# Patient Record
Sex: FEMALE | Race: WHITE | NOT HISPANIC OR LATINO | Employment: FULL TIME | URBAN - METROPOLITAN AREA
[De-identification: names, ages, dates, MRNs, and addresses within clinical notes are randomized per-mention and may not be internally consistent; named-entity substitution may affect disease eponyms.]

---

## 2017-02-16 ENCOUNTER — LAB VISIT (OUTPATIENT)
Dept: LAB | Facility: HOSPITAL | Age: 52
End: 2017-02-16
Attending: FAMILY MEDICINE
Payer: COMMERCIAL

## 2017-02-16 ENCOUNTER — OFFICE VISIT (OUTPATIENT)
Dept: INTERNAL MEDICINE | Facility: CLINIC | Age: 52
End: 2017-02-16
Payer: COMMERCIAL

## 2017-02-16 VITALS
TEMPERATURE: 98 F | WEIGHT: 173.31 LBS | BODY MASS INDEX: 29.59 KG/M2 | SYSTOLIC BLOOD PRESSURE: 146 MMHG | HEIGHT: 64 IN | DIASTOLIC BLOOD PRESSURE: 88 MMHG | HEART RATE: 80 BPM

## 2017-02-16 DIAGNOSIS — R03.0 ELEVATED BLOOD PRESSURE READING: ICD-10-CM

## 2017-02-16 DIAGNOSIS — R42 DIZZINESS: Primary | ICD-10-CM

## 2017-02-16 DIAGNOSIS — R42 DIZZINESS: ICD-10-CM

## 2017-02-16 DIAGNOSIS — Z76.89 ESTABLISHING CARE WITH NEW DOCTOR, ENCOUNTER FOR: ICD-10-CM

## 2017-02-16 LAB
ALBUMIN SERPL BCP-MCNC: 3.6 G/DL
ALP SERPL-CCNC: 68 U/L
ALT SERPL W/O P-5'-P-CCNC: 21 U/L
ANION GAP SERPL CALC-SCNC: 9 MMOL/L
AST SERPL-CCNC: 25 U/L
BASOPHILS # BLD AUTO: 0.05 K/UL
BASOPHILS NFR BLD: 0.8 %
BILIRUB SERPL-MCNC: 0.4 MG/DL
BUN SERPL-MCNC: 10 MG/DL
CALCIUM SERPL-MCNC: 9 MG/DL
CHLORIDE SERPL-SCNC: 104 MMOL/L
CO2 SERPL-SCNC: 26 MMOL/L
CREAT SERPL-MCNC: 0.8 MG/DL
DIFFERENTIAL METHOD: ABNORMAL
EOSINOPHIL # BLD AUTO: 0.2 K/UL
EOSINOPHIL NFR BLD: 3.3 %
ERYTHROCYTE [DISTWIDTH] IN BLOOD BY AUTOMATED COUNT: 14.7 %
EST. GFR  (AFRICAN AMERICAN): >60 ML/MIN/1.73 M^2
EST. GFR  (NON AFRICAN AMERICAN): >60 ML/MIN/1.73 M^2
GLUCOSE SERPL-MCNC: 79 MG/DL
HCT VFR BLD AUTO: 38.2 %
HGB BLD-MCNC: 12 G/DL
LYMPHOCYTES # BLD AUTO: 1.9 K/UL
LYMPHOCYTES NFR BLD: 31.3 %
MCH RBC QN AUTO: 26.5 PG
MCHC RBC AUTO-ENTMCNC: 31.4 %
MCV RBC AUTO: 84 FL
MONOCYTES # BLD AUTO: 0.6 K/UL
MONOCYTES NFR BLD: 9.3 %
NEUTROPHILS # BLD AUTO: 3.4 K/UL
NEUTROPHILS NFR BLD: 55.1 %
PLATELET # BLD AUTO: 265 K/UL
PMV BLD AUTO: 10.7 FL
POTASSIUM SERPL-SCNC: 3.8 MMOL/L
PROT SERPL-MCNC: 7.4 G/DL
RBC # BLD AUTO: 4.53 M/UL
SODIUM SERPL-SCNC: 139 MMOL/L
WBC # BLD AUTO: 6.11 K/UL

## 2017-02-16 PROCEDURE — 99214 OFFICE O/P EST MOD 30 MIN: CPT | Mod: S$GLB,,, | Performed by: FAMILY MEDICINE

## 2017-02-16 PROCEDURE — 85025 COMPLETE CBC W/AUTO DIFF WBC: CPT

## 2017-02-16 PROCEDURE — 99999 PR PBB SHADOW E&M-EST. PATIENT-LVL III: CPT | Mod: PBBFAC,,, | Performed by: FAMILY MEDICINE

## 2017-02-16 PROCEDURE — 80053 COMPREHEN METABOLIC PANEL: CPT

## 2017-02-16 PROCEDURE — 36415 COLL VENOUS BLD VENIPUNCTURE: CPT

## 2017-02-16 NOTE — LETTER
February 16, 2017      [facility]  [address] [place] [address]  Phone: [phone]  Fax: [phone]       Patient: Lia Ragsdale   YOB: 1965  Date of Visit: 02/16/2017    To Whom It May Concern:    Lia was at [facility] on 02/16/2017. She may return to work on 02/20/2017 with no restrictions. If you have any questions or concerns, or if I can be of further assistance, please do not hesitate to contact me.          Sincerely,        Romana Rivera MD

## 2017-02-16 NOTE — MR AVS SNAPSHOT
"    [facility]  [address] [facility]  [place] [address]  Phone: [phone]  Fax: [phone]                  Lia Ragsdale   2017 11:00 AM   Office Visit    Description:  Female : 1965   Provider:  Romana Rivera MD   Department:  [facility]           Reason for Visit     Establish Care     Hypertension     Dizziness           Diagnoses this Visit        Comments    Dizziness    -  Primary     Elevated blood pressure reading                To Do List           Goals (5 Years of Data)     None      Follow-Up and Disposition     Return in about 4 weeks (around 3/16/2017).      [facility]     [facility] On Call Nurse Care Line -  Assistance  Registered nurses in the [facility] Center provide clinical advisement, health education, appointment booking, and other advisory services.  Call for this free service at [phone].             Medications           Message regarding Medications     Verify the changes and/or additions to your medication regime listed below are the same as discussed with your clinician today.  If any of these changes or additions are incorrect, please notify your healthcare provider.             Verify that the below list of medications is an accurate representation of the medications you are currently taking.  If none reported, the list may be blank. If incorrect, please contact your healthcare provider. Carry this list with you in case of emergency.           Current Medications     cetirizine (ZYRTEC) 10 MG tablet     pantoprazole (PROTONIX) 40 MG tablet Take 1 tablet (40 mg total) by mouth once daily.    tolterodine (DETROL LA) 2 MG Cp24 Take 1 capsule (2 mg total) by mouth once daily.           Clinical Reference Information           Your Vitals Were     BP Pulse Temp Height Weight BMI    146/88 80 97.9 °F (36.6 °C) (Tympanic) 5' 4" (1.626 m) 78.6 kg (173 lb 4.5 oz) 29.74 kg/m2      Blood Pressure          Most " Recent Value    BP  (!)  146/88      Allergies as of 2/16/2017     No Known Allergies      Immunizations Administered on Date of Encounter - 2/16/2017     None      Orders Placed During Today's Visit     Future Labs/Procedures Expected by Expires    CBC auto differential  2/16/2017 5/17/2017    Comprehensive metabolic panel  2/16/2017 4/17/2018    Lipid panel  2/16/2017 5/17/2017      Language Assistance Services     ATTENTION: Language assistance services are available, free of charge. Please call [phone].      ATENCIÓN: Si habla andreiaañol, tiene a tapia disposición servicios gratuitos de asistencia lingüística. Llame al [phone].     CHÚ Ý: N?u b?n nói Ti?ng Vi?t, có các d?ch v? h? tr? ngôn ng? mi?n phí dành cho b?n. G?i s? [phone].         [facility] complies with applicable Federal civil rights laws and does not discriminate on the basis of race, color, national origin, age, disability, or sex.

## 2017-02-16 NOTE — PROGRESS NOTES
Subjective:       Patient ID: Lia Ragsdale is a 51 y.o. female.    Chief Complaint: Establish Care; Hypertension; and Dizziness    HPI Mrs. Ragsdale presents today to Establish care and address elevated blood pressure and dizziness. She has a past medical history as listed below.   She states that she has never had problems with blood pressure but she got up this morning felt nausea and like she was off balance. She feels a little better now. She drank apple cider vinegar and water.   This morning at work had nurse check 8 am 164/82, 9 am 150/80 and 10am 158/82.  She has no other complaint.    Review of Systems   Constitutional: Negative for activity change, appetite change, fatigue and fever.   HENT: Negative for congestion, ear pain and sinus pressure.    Eyes: Negative for pain and visual disturbance.   Respiratory: Negative for cough, chest tightness and wheezing.    Cardiovascular: Negative for chest pain, palpitations and leg swelling.   Gastrointestinal: Negative for abdominal distention, abdominal pain, constipation, diarrhea, nausea and vomiting.   Endocrine: Negative for polydipsia and polyuria.   Genitourinary: Negative for difficulty urinating, dyspareunia, dysuria, flank pain and hematuria.   Musculoskeletal: Negative for arthralgias and back pain.   Skin: Negative for rash.   Neurological: Negative for dizziness, tremors, syncope, weakness, numbness and headaches.   Psychiatric/Behavioral: Negative for agitation and confusion.           Past Medical History   Diagnosis Date    Bladder instability 2015    Depression      History of depression in the past. Pt states it was from a stressful job.    Gallstones     General anesthetics causing adverse effect in therapeutic use      had respiratory distress after propofol    HPV (human papilloma virus) anogenital infection     Peptic ulcer disease      Past Surgical History   Procedure Laterality Date    Tubal ligation       section,  low transverse       x 1    Cholecystectomy       Family History   Problem Relation Age of Onset    Colon cancer Father     Diabetes Mother     Hypertension Mother     Hypertension Sister     Breast cancer Sister 54     Stage 1     Social History     Social History    Marital status:      Spouse name: N/A    Number of children: N/A    Years of education: N/A     Social History Main Topics    Smoking status: Never Smoker    Smokeless tobacco: Never Used    Alcohol use Yes      Comment: Socially drinks    Drug use: No    Sexual activity: Yes     Partners: Male     Other Topics Concern    None     Social History Narrative       Objective:        Physical Exam   Constitutional: She is oriented to person, place, and time. She appears well-nourished. No distress.   HENT:   Head: Normocephalic and atraumatic.   Right Ear: External ear normal.   Left Ear: External ear normal.   Nose: Nose normal.   Mouth/Throat: Oropharynx is clear and moist.   Eyes: EOM are normal. Pupils are equal, round, and reactive to light. Right eye exhibits no discharge. Left eye exhibits no discharge.   Neck: Normal range of motion. Neck supple. No thyromegaly present.   Cardiovascular: Normal rate, regular rhythm and normal heart sounds.    No murmur heard.  Pulmonary/Chest: Effort normal and breath sounds normal. No respiratory distress. She has no wheezes.   Abdominal: Soft. Bowel sounds are normal. She exhibits no distension. There is no tenderness.   Musculoskeletal: Normal range of motion. She exhibits no edema.   Neurological: She is alert and oriented to person, place, and time. Coordination normal.   Skin: Skin is warm and dry. No rash noted.   Psychiatric: She has a normal mood and affect. Her behavior is normal.   Nursing note and vitals reviewed.        Assessment/Plan:   Dizziness  -     Comprehensive metabolic panel; Future; Expected date: 2/16/17  -     CBC auto differential; Future; Expected date:  2/16/17    Elevated blood pressure reading  -     Comprehensive metabolic panel; Future; Expected date: 2/16/17  -     Lipid panel; Future; Expected date: 2/16/17  Pt to check BP for the next 2 weeks at least once daily.     Establishing care with new doctor, encounter for        -     Reviewed past medical, surgical, social and family history. Discussed health maintenance.       Return in about 4 weeks (around 3/16/2017).    Romana Rivera MD  [facility]   Family Medicine

## 2017-02-20 ENCOUNTER — LAB VISIT (OUTPATIENT)
Dept: LAB | Facility: HOSPITAL | Age: 52
End: 2017-02-20
Attending: FAMILY MEDICINE
Payer: COMMERCIAL

## 2017-02-20 DIAGNOSIS — R03.0 ELEVATED BLOOD PRESSURE READING: ICD-10-CM

## 2017-02-20 LAB
CHOLEST/HDLC SERPL: 4.2 {RATIO}
HDL/CHOLESTEROL RATIO: 23.6 %
HDLC SERPL-MCNC: 220 MG/DL
HDLC SERPL-MCNC: 52 MG/DL
LDLC SERPL CALC-MCNC: 152.6 MG/DL
NONHDLC SERPL-MCNC: 168 MG/DL
TRIGL SERPL-MCNC: 77 MG/DL

## 2017-02-20 PROCEDURE — 36415 COLL VENOUS BLD VENIPUNCTURE: CPT

## 2017-02-20 PROCEDURE — 80061 LIPID PANEL: CPT

## 2017-02-27 ENCOUNTER — OFFICE VISIT (OUTPATIENT)
Dept: INTERNAL MEDICINE | Facility: CLINIC | Age: 52
End: 2017-02-27
Payer: COMMERCIAL

## 2017-02-27 VITALS
TEMPERATURE: 98 F | BODY MASS INDEX: 29.92 KG/M2 | OXYGEN SATURATION: 99 % | HEIGHT: 64 IN | WEIGHT: 175.25 LBS | HEART RATE: 76 BPM | SYSTOLIC BLOOD PRESSURE: 142 MMHG | DIASTOLIC BLOOD PRESSURE: 84 MMHG

## 2017-02-27 DIAGNOSIS — R03.0 ELEVATED BLOOD PRESSURE READING: Primary | ICD-10-CM

## 2017-02-27 DIAGNOSIS — R51.9 PRESSURE IN HEAD: ICD-10-CM

## 2017-02-27 DIAGNOSIS — R42 DIZZINESS: ICD-10-CM

## 2017-02-27 DIAGNOSIS — Z87.828 H/O HEAD INJURY: ICD-10-CM

## 2017-02-27 DIAGNOSIS — I10 ESSENTIAL HYPERTENSION: ICD-10-CM

## 2017-02-27 DIAGNOSIS — R51.9 HEADACHE, UNSPECIFIED HEADACHE TYPE: ICD-10-CM

## 2017-02-27 PROCEDURE — 99999 PR PBB SHADOW E&M-EST. PATIENT-LVL III: CPT | Mod: PBBFAC,,, | Performed by: FAMILY MEDICINE

## 2017-02-27 PROCEDURE — 99213 OFFICE O/P EST LOW 20 MIN: CPT | Mod: S$GLB,,, | Performed by: FAMILY MEDICINE

## 2017-02-27 PROCEDURE — 1160F RVW MEDS BY RX/DR IN RCRD: CPT | Mod: S$GLB,,, | Performed by: FAMILY MEDICINE

## 2017-02-27 PROCEDURE — 3079F DIAST BP 80-89 MM HG: CPT | Mod: S$GLB,,, | Performed by: FAMILY MEDICINE

## 2017-02-27 PROCEDURE — 3077F SYST BP >= 140 MM HG: CPT | Mod: S$GLB,,, | Performed by: FAMILY MEDICINE

## 2017-02-27 RX ORDER — CLONIDINE HYDROCHLORIDE 0.1 MG/1
0.1 TABLET ORAL 2 TIMES DAILY
Qty: 60 TABLET | Refills: 11 | Status: SHIPPED | OUTPATIENT
Start: 2017-02-27 | End: 2017-02-27

## 2017-02-27 RX ORDER — CLONIDINE HYDROCHLORIDE 0.1 MG/1
0.1 TABLET ORAL
Status: COMPLETED | OUTPATIENT
Start: 2017-02-27 | End: 2017-02-27

## 2017-02-27 RX ORDER — AMLODIPINE BESYLATE 5 MG/1
5 TABLET ORAL DAILY
Qty: 30 TABLET | Refills: 1 | Status: SHIPPED | OUTPATIENT
Start: 2017-02-27 | End: 2017-04-12 | Stop reason: SDUPTHER

## 2017-02-27 RX ADMIN — CLONIDINE HYDROCHLORIDE 0.1 MG: 0.1 TABLET ORAL at 05:02

## 2017-02-27 NOTE — MR AVS SNAPSHOT
[facility]  [address] [address]  Phone: [phone]  Fax: [phone]                  Lia Ragsdale   2017 4:40 PM   Office Visit    Description:  Female : 1965   Provider:  Romana Rivera MD   Department:  [facility]           Reason for Visit     Headache     Hypertension           Diagnoses this Visit        Comments    Elevated blood pressure reading    -  Primary     Headache, unspecified headache type         Pressure in head         Dizziness         H/O head injury         Essential hypertension                To Do List           Future Appointments        Provider Department Dept Phone    3/3/2017 9:30 AM [facility] CT1 LIMIT 500 LBS [facility] [phone]    3/17/2017 1:00 PM Romana Rivera MD [facility] [phone]      Goals (5 Years of Data)     None       These Medications        Disp Refills Start End    amlodipine (NORVASC) 5 MG tablet 30 tablet 1 2017    Take 1 tablet (5 mg total) by mouth once daily. - Oral    Pharmacy: [facility] [address] #: [phone]         [facility] On Call     [facility] Nurse Care Line -  Assistance  Registered nurses in the [facility] Center provide clinical advisement, health education, appointment booking, and other advisory services.  Call for this free service at [phone].             Medications           Message regarding Medications     Verify the changes and/or additions to your medication regime listed below are the same as discussed with your clinician today.  If any of these changes or additions are incorrect, please notify your healthcare provider.        START taking these NEW medications        Refills    amlodipine (NORVASC) 5 MG tablet 1    Sig: Take 1 tablet (5 mg total) by mouth once daily.    Class: Normal    Route: Oral      These  medications were administered today        Dose Freq    cloNIDine tablet 0.1 mg 0.1 mg Clinic/HOD 1 time    Sig: Take 1 tablet (0.1 mg total) by mouth one time.    Class: Normal    Route: Oral      STOP taking these medications     tolterodine (DETROL LA) 2 MG Cp24 Take 1 capsule (2 mg total) by mouth once daily.           Verify that the below list of medications is an accurate representation of the medications you are currently taking.  If none reported, the list may be blank. If incorrect, please contact your healthcare provider. Carry this list with you in case of emergency.           Current Medications     cetirizine (ZYRTEC) 10 MG tablet     pantoprazole (PROTONIX) 40 MG tablet Take 1 tablet (40 mg total) by mouth once daily.    amlodipine (NORVASC) 5 MG tablet Take 1 tablet (5 mg total) by mouth once daily.           Clinical Reference Information           Your Vitals Were     BP                   142/86 (BP Location: Left arm, Patient Position: Sitting, BP Method: Manual)           Blood Pressure          Most Recent Value    BP  (!)  142/86      Allergies as of 2/27/2017     No Known Allergies      Immunizations Administered on Date of Encounter - 2/27/2017     None      Orders Placed During Today's Visit     Future Labs/Procedures Expected by Expires    CT Head Without Contrast  2/27/2017 2/27/2018      Administrations This Visit     cloNIDine tablet 0.1 mg     Admin Date Action Dose Route Administered By             02/27/2017 Given 0.1 mg Oral Vera Varner LPN                      Language Assistance Services     ATTENTION: Language assistance services are available, free of charge. Please call [phone].      ATENCIÓN: Si habla español, tiene a tapia disposición servicios gratuitos de asistencia lingüística. Llame al [phone].     CHÚ Ý: N?u b?n nói Ti?ng Vi?t, có các d?ch v? h? tr? ngôn ng? mi?n phí dành cho b?n. G?i s? [phone].         [facility] complies with  applicable Federal civil rights laws and does not discriminate on the basis of race, color, national origin, age, disability, or sex.
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
## 2017-03-01 ENCOUNTER — PATIENT MESSAGE (OUTPATIENT)
Dept: INTERNAL MEDICINE | Facility: CLINIC | Age: 52
End: 2017-03-01

## 2017-03-02 ENCOUNTER — TELEPHONE (OUTPATIENT)
Dept: RADIOLOGY | Facility: HOSPITAL | Age: 52
End: 2017-03-02

## 2017-03-03 ENCOUNTER — HOSPITAL ENCOUNTER (OUTPATIENT)
Dept: RADIOLOGY | Facility: HOSPITAL | Age: 52
Discharge: HOME | End: 2017-03-03
Attending: FAMILY MEDICINE
Payer: COMMERCIAL

## 2017-03-03 DIAGNOSIS — R42 DIZZINESS: ICD-10-CM

## 2017-03-03 DIAGNOSIS — Z87.828 H/O HEAD INJURY: ICD-10-CM

## 2017-03-03 DIAGNOSIS — R51.9 PRESSURE IN HEAD: ICD-10-CM

## 2017-03-03 PROCEDURE — 70450 CT HEAD/BRAIN W/O DYE: CPT | Mod: 26,,, | Performed by: RADIOLOGY

## 2017-03-03 PROCEDURE — 70450 CT HEAD/BRAIN W/O DYE: CPT | Mod: TC,PO

## 2017-03-15 ENCOUNTER — TELEPHONE (OUTPATIENT)
Dept: OBSTETRICS AND GYNECOLOGY | Facility: CLINIC | Age: 52
End: 2017-03-15

## 2017-03-15 NOTE — TELEPHONE ENCOUNTER
----- Message from Susan Ordoñez sent at 3/15/2017  2:05 PM CDT -----  Contact: pt  States she left a message yesterday regarding detrol 2 mg tablet and dosage. States its not working. States she would like to know if something else can be called in or increase the dosage. States she used to take vesicare and would like to have that called in. Please call pt at [phone]. Thank you

## 2017-03-15 NOTE — TELEPHONE ENCOUNTER
----- Message from Marjorie Grady sent at 3/14/2017 11:35 AM CDT -----  Contact: pt  Pt states she is having a problem with leakage and states Detrol 10mg is not working pt want to be advised, pt can be reached at [phone]///thxMW

## 2017-04-12 ENCOUNTER — OFFICE VISIT (OUTPATIENT)
Dept: INTERNAL MEDICINE | Facility: CLINIC | Age: 52
End: 2017-04-12
Payer: COMMERCIAL

## 2017-04-12 VITALS
OXYGEN SATURATION: 99 % | SYSTOLIC BLOOD PRESSURE: 130 MMHG | HEART RATE: 70 BPM | DIASTOLIC BLOOD PRESSURE: 76 MMHG | BODY MASS INDEX: 29.43 KG/M2 | TEMPERATURE: 98 F | HEIGHT: 64 IN | WEIGHT: 172.38 LBS

## 2017-04-12 DIAGNOSIS — I10 ESSENTIAL HYPERTENSION: ICD-10-CM

## 2017-04-12 DIAGNOSIS — K21.9 GASTROESOPHAGEAL REFLUX DISEASE WITHOUT ESOPHAGITIS: Primary | ICD-10-CM

## 2017-04-12 DIAGNOSIS — Z23 NEED FOR TDAP VACCINATION: ICD-10-CM

## 2017-04-12 PROCEDURE — 99213 OFFICE O/P EST LOW 20 MIN: CPT | Mod: 25,S$GLB,, | Performed by: FAMILY MEDICINE

## 2017-04-12 PROCEDURE — 99999 PR PBB SHADOW E&M-EST. PATIENT-LVL III: CPT | Mod: PBBFAC,,, | Performed by: FAMILY MEDICINE

## 2017-04-12 PROCEDURE — 90715 TDAP VACCINE 7 YRS/> IM: CPT | Mod: S$GLB,,, | Performed by: FAMILY MEDICINE

## 2017-04-12 PROCEDURE — 3075F SYST BP GE 130 - 139MM HG: CPT | Mod: S$GLB,,, | Performed by: FAMILY MEDICINE

## 2017-04-12 PROCEDURE — 90471 IMMUNIZATION ADMIN: CPT | Mod: S$GLB,,, | Performed by: FAMILY MEDICINE

## 2017-04-12 PROCEDURE — 1160F RVW MEDS BY RX/DR IN RCRD: CPT | Mod: S$GLB,,, | Performed by: FAMILY MEDICINE

## 2017-04-12 PROCEDURE — 3078F DIAST BP <80 MM HG: CPT | Mod: S$GLB,,, | Performed by: FAMILY MEDICINE

## 2017-04-12 RX ORDER — PANTOPRAZOLE SODIUM 40 MG/1
40 TABLET, DELAYED RELEASE ORAL DAILY
Qty: 90 TABLET | Refills: 2 | Status: SHIPPED | OUTPATIENT
Start: 2017-04-12 | End: 2018-02-07 | Stop reason: SDUPTHER

## 2017-04-12 RX ORDER — AMLODIPINE BESYLATE 5 MG/1
5 TABLET ORAL DAILY
Qty: 90 TABLET | Refills: 3 | Status: SHIPPED | OUTPATIENT
Start: 2017-04-12 | End: 2018-02-07

## 2017-04-12 NOTE — MR AVS SNAPSHOT
[facility]  [address] [place] [address]  Phone: [phone]  Fax: [phone]                  Lia Ragsdale   2017 9:00 AM   Office Visit    Description:  Female : 1965   Provider:  Romana Rivera MD   Department:  [facility]           Reason for Visit     Hypertension           Diagnoses this Visit        Comments    Gastroesophageal reflux disease without esophagitis    -  Primary     Essential hypertension         Need for Tdap vaccination                To Do List           Goals (5 Years of Data)     None       These Medications        Disp Refills Start End    amlodipine (NORVASC) 5 MG tablet 90 tablet 3 2017    Take 1 tablet (5 mg total) by mouth once daily. - Oral    Pharmacy: [place] [id number] Ph #: [phone]       pantoprazole (PROTONIX) 40 MG tablet 90 tablet 2 2017    Take 1 tablet (40 mg total) by mouth once daily. - Oral    Pharmacy: [place] [address] Ph #: [phone]         [facility] Call     [facility] On Call Nurse Care Line -  Assistance  Unless otherwise directed by your provider, please contact [facility], our nurse care line that is available for  assistance.     Registered nurses in the [facility] On Call Center provide: appointment scheduling, clinical advisement, health education, and other advisory services.  Call: [phone] (toll free)               Medications           Message regarding Medications     Verify the changes and/or additions to your medication regime listed below are the same as discussed with your clinician today.  If any of these changes or additions are incorrect, please notify your healthcare provider.             Verify that the below list of medications is an accurate representation of the medications you are currently taking.   "If none reported, the list may be blank. If incorrect, please contact your healthcare provider. Carry this list with you in case of emergency.           Current Medications     amlodipine (NORVASC) 5 MG tablet Take 1 tablet (5 mg total) by mouth once daily.    cetirizine (ZYRTEC) 10 MG tablet     pantoprazole (PROTONIX) 40 MG tablet Take 1 tablet (40 mg total) by mouth once daily.           Clinical Reference Information           Your Vitals Were     BP Pulse Temp Height    130/76 (BP Location: Left arm, Patient Position: Sitting, BP Method: Manual) 70 97.5 °F (36.4 °C) (Tympanic) 5' 4" (1.626 m)    Weight SpO2 BMI    78.2 kg (172 lb 6.4 oz) 99% 29.59 kg/m2      Blood Pressure          Most Recent Value    BP  130/76      Allergies as of 4/12/2017     No Known Allergies      Immunizations Administered on Date of Encounter - 4/12/2017     Name Date Dose VIS Date Route    TDAP  Incomplete 0.5 mL 2/24/2015 Intramuscular      Orders Placed During Today's Visit      Normal Orders This Visit    Tdap Vaccine       Language Assistance Services     ATTENTION: Language assistance services are available, free of charge. Please call [phone].      ATENCIÓN: Si habla pipo, tiene a tapia disposición servicios gratuitos de asistencia lingüística. Llame al [phone].     CHÚ Ý: N?u b?n nói Ti?ng Vi?t, có các d?ch v? h? tr? ngôn ng? mi?n phí dành cho b?n. G?i s? [phone].         [facility] complies with applicable Federal civil rights laws and does not discriminate on the basis of race, color, national origin, age, disability, or sex.        "

## 2018-02-02 ENCOUNTER — OFFICE VISIT (OUTPATIENT)
Dept: OBSTETRICS AND GYNECOLOGY | Facility: CLINIC | Age: 53
End: 2018-02-02
Payer: COMMERCIAL

## 2018-02-02 VITALS
HEIGHT: 64 IN | BODY MASS INDEX: 29.02 KG/M2 | DIASTOLIC BLOOD PRESSURE: 76 MMHG | WEIGHT: 170 LBS | SYSTOLIC BLOOD PRESSURE: 128 MMHG

## 2018-02-02 DIAGNOSIS — Z12.31 SCREENING MAMMOGRAM, ENCOUNTER FOR: ICD-10-CM

## 2018-02-02 DIAGNOSIS — Z78.0 MENOPAUSE: ICD-10-CM

## 2018-02-02 DIAGNOSIS — Z01.419 ENCOUNTER FOR GYNECOLOGICAL EXAMINATION WITHOUT ABNORMAL FINDING: ICD-10-CM

## 2018-02-02 DIAGNOSIS — Z12.4 PAP SMEAR FOR CERVICAL CANCER SCREENING: ICD-10-CM

## 2018-02-02 DIAGNOSIS — N32.89 BLADDER INSTABILITY: Primary | ICD-10-CM

## 2018-02-02 PROCEDURE — 88175 CYTOPATH C/V AUTO FLUID REDO: CPT

## 2018-02-02 PROCEDURE — 99999 PR PBB SHADOW E&M-EST. PATIENT-LVL III: CPT | Mod: PBBFAC,,, | Performed by: OBSTETRICS & GYNECOLOGY

## 2018-02-02 PROCEDURE — 99396 PREV VISIT EST AGE 40-64: CPT | Mod: S$GLB,,, | Performed by: OBSTETRICS & GYNECOLOGY

## 2018-02-02 RX ORDER — SOLIFENACIN SUCCINATE 5 MG/1
5 TABLET, FILM COATED ORAL DAILY
Qty: 30 TABLET | Refills: 11 | Status: SHIPPED | OUTPATIENT
Start: 2018-02-02 | End: 2019-01-24 | Stop reason: SDUPTHER

## 2018-02-02 NOTE — PROGRESS NOTES
Subjective:       Patient ID: Lia Ragsdale is a 52 y.o. female.    Chief Complaint:  Annual Exam      History of Present Illness  HPI  Patient presents to day for annual exam , postmenopausal since 2017    No gyn complaints, no vaginal bleeding or pelvic pain noted.   Having return of urge since off Vesicare.  Advised to start again if symptoms improved   Hormonal therapy reviewed and discussed. No symptoms, on none   Preventive screening exam indication and testing reviewed and discussed.      GYN & OB History  No LMP recorded. Patient is not currently having periods (Reason: Other).   Date of Last Pap: 2015    OB History    Para Term  AB Living   1 1 1     1   SAB TAB Ectopic Multiple Live Births                  # Outcome Date GA Lbr Lokesh/2nd Weight Sex Delivery Anes PTL Lv   1 Term      CS-Unspec             Review of Systems  Review of Systems   Constitutional: Negative for activity change, appetite change, chills, fatigue, fever and unexpected weight change.   HENT: Negative for mouth sores.    Eyes: Negative for visual disturbance.   Respiratory: Negative for cough and shortness of breath.    Cardiovascular: Negative for chest pain and palpitations.   Gastrointestinal: Negative for abdominal pain, bloating, blood in stool, constipation, diarrhea and nausea.   Genitourinary: Positive for urgency. Negative for decreased libido, dyspareunia, dysuria, frequency, genital sores, hematuria, pelvic pain, vaginal discharge, urinary incontinence, postcoital bleeding, postmenopausal bleeding and vaginal odor.   Musculoskeletal: Negative for back pain, joint swelling and myalgias.   Skin:  Negative for rash.   Neurological: Negative for syncope, numbness and headaches.   Hematological: Negative for adenopathy. Does not bruise/bleed easily.   Psychiatric/Behavioral: Negative for depression and sleep disturbance. The patient is not nervous/anxious.    Breast: Negative for breast mass, breast  pain, nipple discharge and skin changes          Objective:    Physical Exam:   Constitutional: She appears well-developed and well-nourished. No distress.      Neck: No JVD present. No thyroid mass and no thyromegaly present.    Cardiovascular: Normal rate and regular rhythm.          Abdominal: Soft. Normal appearance and bowel sounds are normal. There is no hepatosplenomegaly. No hernia. Hernia confirmed negative in the ventral area, confirmed negative in the right inguinal area and confirmed negative in the left inguinal area.     Genitourinary: Rectum normal, vagina normal and uterus normal. There is no rash, tenderness, lesion or injury on the right labia. There is no rash, tenderness, lesion or injury on the left labia. Uterus is not deviated, not enlarged, not fixed, not tender and not experiencing uterine prolapse. Cervix is normal. Right adnexum displays no mass, no tenderness and no fullness. Left adnexum displays no mass, no tenderness and no fullness. No erythema, tenderness or bleeding in the vagina. No foreign body in the vagina. No vaginal discharge found. Labial bartholins normal.Cervix exhibits no motion tenderness, no discharge and no friability. Additional cervical findings: pap smear done                       Assessment:        1. Bladder instability    2. Pap smear for cervical cancer screening    3. Screening mammogram, encounter for    4. Menopause    5. Encounter for gynecological examination without abnormal finding                Plan:      Lia was seen today for annual exam.    Diagnoses and all orders for this visit:    Bladder instability  -     solifenacin (VESICARE) 5 MG tablet; Take 1 tablet (5 mg total) by mouth once daily.    Pap smear for cervical cancer screening  -     Liquid-based pap smear, screening    Screening mammogram, encounter for  -     Mammo Digital Screening Bilat with CAD; Future    Menopause    Encounter for gynecological examination without abnormal  finding

## 2018-02-07 ENCOUNTER — LAB VISIT (OUTPATIENT)
Dept: LAB | Facility: HOSPITAL | Age: 53
End: 2018-02-07
Attending: FAMILY MEDICINE
Payer: COMMERCIAL

## 2018-02-07 ENCOUNTER — OFFICE VISIT (OUTPATIENT)
Dept: INTERNAL MEDICINE | Facility: CLINIC | Age: 53
End: 2018-02-07
Payer: COMMERCIAL

## 2018-02-07 VITALS
HEIGHT: 64 IN | OXYGEN SATURATION: 98 % | DIASTOLIC BLOOD PRESSURE: 68 MMHG | SYSTOLIC BLOOD PRESSURE: 116 MMHG | BODY MASS INDEX: 29.02 KG/M2 | TEMPERATURE: 98 F | HEART RATE: 72 BPM | WEIGHT: 170 LBS

## 2018-02-07 DIAGNOSIS — E78.00 ELEVATED CHOLESTEROL: ICD-10-CM

## 2018-02-07 DIAGNOSIS — I10 ESSENTIAL HYPERTENSION: ICD-10-CM

## 2018-02-07 DIAGNOSIS — K21.9 GASTROESOPHAGEAL REFLUX DISEASE WITHOUT ESOPHAGITIS: ICD-10-CM

## 2018-02-07 DIAGNOSIS — E78.00 ELEVATED CHOLESTEROL: Primary | ICD-10-CM

## 2018-02-07 LAB
ALBUMIN SERPL BCP-MCNC: 3.5 G/DL
ALP SERPL-CCNC: 60 U/L
ALT SERPL W/O P-5'-P-CCNC: 9 U/L
ANION GAP SERPL CALC-SCNC: 8 MMOL/L
AST SERPL-CCNC: 14 U/L
BILIRUB SERPL-MCNC: 0.5 MG/DL
BUN SERPL-MCNC: 10 MG/DL
CALCIUM SERPL-MCNC: 9.6 MG/DL
CHLORIDE SERPL-SCNC: 104 MMOL/L
CHOLEST SERPL-MCNC: 209 MG/DL
CHOLEST/HDLC SERPL: 3.9 {RATIO}
CO2 SERPL-SCNC: 29 MMOL/L
CREAT SERPL-MCNC: 0.8 MG/DL
EST. GFR  (AFRICAN AMERICAN): >60 ML/MIN/1.73 M^2
EST. GFR  (NON AFRICAN AMERICAN): >60 ML/MIN/1.73 M^2
GLUCOSE SERPL-MCNC: 72 MG/DL
HDLC SERPL-MCNC: 53 MG/DL
HDLC SERPL: 25.4 %
LDLC SERPL CALC-MCNC: 140.2 MG/DL
NONHDLC SERPL-MCNC: 156 MG/DL
POTASSIUM SERPL-SCNC: 4 MMOL/L
PROT SERPL-MCNC: 7.3 G/DL
SODIUM SERPL-SCNC: 141 MMOL/L
TRIGL SERPL-MCNC: 79 MG/DL

## 2018-02-07 PROCEDURE — 36415 COLL VENOUS BLD VENIPUNCTURE: CPT

## 2018-02-07 PROCEDURE — 99214 OFFICE O/P EST MOD 30 MIN: CPT | Mod: S$GLB,,, | Performed by: FAMILY MEDICINE

## 2018-02-07 PROCEDURE — 99999 PR PBB SHADOW E&M-EST. PATIENT-LVL III: CPT | Mod: PBBFAC,,, | Performed by: FAMILY MEDICINE

## 2018-02-07 PROCEDURE — 80053 COMPREHEN METABOLIC PANEL: CPT

## 2018-02-07 PROCEDURE — 80061 LIPID PANEL: CPT

## 2018-02-07 PROCEDURE — 3008F BODY MASS INDEX DOCD: CPT | Mod: S$GLB,,, | Performed by: FAMILY MEDICINE

## 2018-02-07 RX ORDER — PANTOPRAZOLE SODIUM 40 MG/1
40 TABLET, DELAYED RELEASE ORAL DAILY
Qty: 90 TABLET | Refills: 3 | Status: SHIPPED | OUTPATIENT
Start: 2018-02-07 | End: 2018-11-16 | Stop reason: SDUPTHER

## 2018-02-07 RX ORDER — AMLODIPINE BESYLATE 2.5 MG/1
2.5 TABLET ORAL DAILY
Qty: 90 TABLET | Refills: 3 | Status: SHIPPED | OUTPATIENT
Start: 2018-02-07 | End: 2018-06-25 | Stop reason: SDUPTHER

## 2018-02-07 RX ORDER — CLOTRIMAZOLE AND BETAMETHASONE DIPROPIONATE 10; .64 MG/G; MG/G
15 CREAM TOPICAL 2 TIMES DAILY
COMMUNITY
End: 2018-03-02 | Stop reason: SDUPTHER

## 2018-02-07 NOTE — PROGRESS NOTES
Subjective:       Patient ID: Lia Ragsdale is a 52 y.o. female.    Chief Complaint: Medication Refill (6 mth f/u HTN) and Hypertension    HPI Ms. Ragsdale presents today for follow up on HTN and medication refill. She has no complaint today.   Taking medication as directed, denies over the counter medication use.   Walk a lot at work.   Had flu shot with work in October.  Has changed diet some limiting fast foods and fried foods.     Recently quit job because of the stress and feeling more depressed with working.     Review of Systems   Constitutional: Negative for activity change and unexpected weight change.   HENT: Negative for hearing loss, rhinorrhea and trouble swallowing.    Eyes: Negative for discharge and visual disturbance.   Respiratory: Positive for chest tightness. Negative for wheezing.    Cardiovascular: Negative for chest pain and palpitations.   Gastrointestinal: Negative for blood in stool, constipation, diarrhea and vomiting.   Endocrine: Positive for polydipsia. Negative for polyuria.   Genitourinary: Negative for difficulty urinating, dysuria, hematuria and menstrual problem.   Musculoskeletal: Negative for arthralgias, joint swelling and neck pain.   Neurological: Negative for weakness and headaches.   Psychiatric/Behavioral: Positive for dysphoric mood. Negative for confusion.           Past Medical History:   Diagnosis Date    Bladder instability 1/13/2015    Depression     History of depression in the past. Pt states it was from a stressful job.    Gallstones     General anesthetics causing adverse effect in therapeutic use     had respiratory distress after propofol    HPV (human papilloma virus) anogenital infection     Peptic ulcer disease      Objective:        Physical Exam   Constitutional: She is oriented to person, place, and time. She appears well-developed and well-nourished.   HENT:   Head: Normocephalic and atraumatic.   Right Ear: External ear normal.   Left Ear:  External ear normal.   Eyes: EOM are normal. Pupils are equal, round, and reactive to light.   Cardiovascular: Normal heart sounds.    Pulmonary/Chest: Breath sounds normal. She is in respiratory distress.   Musculoskeletal: Normal range of motion.   Neurological: She is oriented to person, place, and time.   Psychiatric: She has a normal mood and affect. Her behavior is normal.   Vitals reviewed.        Assessment/Plan:     Elevated cholesterol  -     Lipid panel; Future; Expected date: 02/07/2018    Essential hypertension  -     amLODIPine (NORVASC) 2.5 MG tablet; Take 1 tablet (2.5 mg total) by mouth once daily.  Dispense: 90 tablet; Refill: 3  -     Comprehensive metabolic panel; Future; Expected date: 02/07/2018  Decreased medication dose today. Patient has lost 4 lbs blood pressure looks good.   Will follow up in 4 weeks for nurses visit. Patient is to check BP at home if >130/80 please inform MD   May discontinue medication depending on her blood pressure    Gastroesophageal reflux disease without esophagitis  -     pantoprazole (PROTONIX) 40 MG tablet; Take 1 tablet (40 mg total) by mouth once daily.  Dispense: 90 tablet; Refill: 3    Other orders  -     Cancel: Case request GI: COLONOSCOPY   She says at her last woman's exam she was told she could repeat this in 2020??        Follow-up if symptoms worsen or fail to improve.    Romana Rivera MD  [facility]   Family Medicine

## 2018-02-08 ENCOUNTER — PATIENT MESSAGE (OUTPATIENT)
Dept: INTERNAL MEDICINE | Facility: CLINIC | Age: 53
End: 2018-02-08

## 2018-02-08 DIAGNOSIS — E78.5 HYPERLIPIDEMIA, UNSPECIFIED HYPERLIPIDEMIA TYPE: Primary | ICD-10-CM

## 2018-02-10 ENCOUNTER — PATIENT MESSAGE (OUTPATIENT)
Dept: INTERNAL MEDICINE | Facility: CLINIC | Age: 53
End: 2018-02-10

## 2018-02-12 ENCOUNTER — TELEPHONE (OUTPATIENT)
Dept: INTERNAL MEDICINE | Facility: CLINIC | Age: 53
End: 2018-02-12

## 2018-02-12 NOTE — TELEPHONE ENCOUNTER
----- Message from Romana Rivera MD sent at 2/8/2018  4:48 PM CST -----  Cholesterol has improved from 220 to 209. Still want it less than 200. Keep up the good work will recheck in 6 months

## 2018-02-12 NOTE — TELEPHONE ENCOUNTER
Notified pt of results. Scheduled repeat Lipid for 6 mths. Pt verbalized understanding and agreed to plan.

## 2018-03-02 ENCOUNTER — PATIENT MESSAGE (OUTPATIENT)
Dept: INTERNAL MEDICINE | Facility: CLINIC | Age: 53
End: 2018-03-02

## 2018-03-02 RX ORDER — CLOTRIMAZOLE AND BETAMETHASONE DIPROPIONATE 10; .64 MG/G; MG/G
15 CREAM TOPICAL 2 TIMES DAILY
Qty: 15 G | Refills: 0 | Status: SHIPPED | OUTPATIENT
Start: 2018-03-02 | End: 2020-11-18 | Stop reason: SDUPTHER

## 2018-03-09 ENCOUNTER — TELEPHONE (OUTPATIENT)
Dept: INTERNAL MEDICINE | Facility: CLINIC | Age: 53
End: 2018-03-09

## 2018-03-09 ENCOUNTER — HOSPITAL ENCOUNTER (OUTPATIENT)
Dept: RADIOLOGY | Facility: HOSPITAL | Age: 53
Discharge: HOME | End: 2018-03-09
Attending: OBSTETRICS & GYNECOLOGY
Payer: COMMERCIAL

## 2018-03-09 DIAGNOSIS — Z12.31 SCREENING MAMMOGRAM, ENCOUNTER FOR: ICD-10-CM

## 2018-03-09 PROCEDURE — 77067 SCR MAMMO BI INCL CAD: CPT | Mod: TC

## 2018-03-09 PROCEDURE — 77067 SCR MAMMO BI INCL CAD: CPT | Mod: 26,,, | Performed by: RADIOLOGY

## 2018-03-09 PROCEDURE — 77063 BREAST TOMOSYNTHESIS BI: CPT | Mod: 26,,, | Performed by: RADIOLOGY

## 2018-03-13 ENCOUNTER — PATIENT MESSAGE (OUTPATIENT)
Dept: INTERNAL MEDICINE | Facility: CLINIC | Age: 53
End: 2018-03-13

## 2018-06-25 DIAGNOSIS — I10 ESSENTIAL HYPERTENSION: ICD-10-CM

## 2018-06-25 RX ORDER — AMLODIPINE BESYLATE 2.5 MG/1
2.5 TABLET ORAL DAILY
Qty: 90 TABLET | Refills: 3 | Status: SHIPPED | OUTPATIENT
Start: 2018-06-25 | End: 2019-02-18 | Stop reason: SDUPTHER

## 2018-06-27 ENCOUNTER — LAB VISIT (OUTPATIENT)
Dept: LAB | Facility: HOSPITAL | Age: 53
End: 2018-06-27
Attending: FAMILY MEDICINE
Payer: COMMERCIAL

## 2018-06-27 ENCOUNTER — OFFICE VISIT (OUTPATIENT)
Dept: INTERNAL MEDICINE | Facility: CLINIC | Age: 53
End: 2018-06-27
Payer: COMMERCIAL

## 2018-06-27 VITALS
SYSTOLIC BLOOD PRESSURE: 124 MMHG | HEIGHT: 64 IN | WEIGHT: 171.5 LBS | DIASTOLIC BLOOD PRESSURE: 78 MMHG | HEART RATE: 64 BPM | BODY MASS INDEX: 29.28 KG/M2 | TEMPERATURE: 98 F | OXYGEN SATURATION: 99 %

## 2018-06-27 DIAGNOSIS — M79.10 MUSCLE PAIN: ICD-10-CM

## 2018-06-27 DIAGNOSIS — Z80.0 FAMILY HISTORY OF COLON CANCER: ICD-10-CM

## 2018-06-27 DIAGNOSIS — R10.11 RIGHT UPPER QUADRANT PAIN: Primary | ICD-10-CM

## 2018-06-27 DIAGNOSIS — R10.11 RIGHT UPPER QUADRANT PAIN: ICD-10-CM

## 2018-06-27 DIAGNOSIS — Z12.11 COLON CANCER SCREENING: ICD-10-CM

## 2018-06-27 LAB
ALBUMIN SERPL BCP-MCNC: 3.8 G/DL
ALP SERPL-CCNC: 63 U/L
ALT SERPL W/O P-5'-P-CCNC: 10 U/L
AMYLASE SERPL-CCNC: 38 U/L
ANION GAP SERPL CALC-SCNC: 8 MMOL/L
AST SERPL-CCNC: 16 U/L
BILIRUB SERPL-MCNC: 0.4 MG/DL
BUN SERPL-MCNC: 9 MG/DL
CALCIUM SERPL-MCNC: 9.7 MG/DL
CHLORIDE SERPL-SCNC: 105 MMOL/L
CO2 SERPL-SCNC: 29 MMOL/L
CREAT SERPL-MCNC: 0.8 MG/DL
EST. GFR  (AFRICAN AMERICAN): >60 ML/MIN/1.73 M^2
EST. GFR  (NON AFRICAN AMERICAN): >60 ML/MIN/1.73 M^2
GLUCOSE SERPL-MCNC: 110 MG/DL
LIPASE SERPL-CCNC: 132 U/L
POTASSIUM SERPL-SCNC: 4.3 MMOL/L
PROT SERPL-MCNC: 7.5 G/DL
SODIUM SERPL-SCNC: 142 MMOL/L

## 2018-06-27 PROCEDURE — 3074F SYST BP LT 130 MM HG: CPT | Mod: CPTII,S$GLB,, | Performed by: FAMILY MEDICINE

## 2018-06-27 PROCEDURE — 83690 ASSAY OF LIPASE: CPT

## 2018-06-27 PROCEDURE — 82150 ASSAY OF AMYLASE: CPT

## 2018-06-27 PROCEDURE — 36415 COLL VENOUS BLD VENIPUNCTURE: CPT

## 2018-06-27 PROCEDURE — 80053 COMPREHEN METABOLIC PANEL: CPT

## 2018-06-27 PROCEDURE — 99999 PR PBB SHADOW E&M-EST. PATIENT-LVL V: CPT | Mod: PBBFAC,,, | Performed by: FAMILY MEDICINE

## 2018-06-27 PROCEDURE — 3078F DIAST BP <80 MM HG: CPT | Mod: CPTII,S$GLB,, | Performed by: FAMILY MEDICINE

## 2018-06-27 PROCEDURE — 99214 OFFICE O/P EST MOD 30 MIN: CPT | Mod: S$GLB,,, | Performed by: FAMILY MEDICINE

## 2018-06-27 PROCEDURE — 3008F BODY MASS INDEX DOCD: CPT | Mod: CPTII,S$GLB,, | Performed by: FAMILY MEDICINE

## 2018-06-27 RX ORDER — SODIUM, POTASSIUM,MAG SULFATES 17.5-3.13G
SOLUTION, RECONSTITUTED, ORAL ORAL
Qty: 354 ML | Refills: 0 | Status: SHIPPED | OUTPATIENT
Start: 2018-06-27 | End: 2021-03-17

## 2018-06-27 RX ORDER — CYCLOBENZAPRINE HCL 5 MG
5 TABLET ORAL 3 TIMES DAILY PRN
Qty: 12 TABLET | Refills: 0 | Status: SHIPPED | OUTPATIENT
Start: 2018-06-27 | End: 2018-07-07

## 2018-06-27 NOTE — PROGRESS NOTES
Subjective:       Patient ID: Lia Ragsdale is a 53 y.o. female.    Chief Complaint: Flank Pain    HPI Ms. Ragsdale presents today with Right side pain  Mom passed away from MI this past February.   Had gallbladder out 2 years ago.   Feeling is similar to this.  Pain comes and goes for the past few months.   Feels like spasm/tightening that won't let go. 6-7/10 and will hurt the entire day.     Concerned dad had stomach cancer it was first diagnosed colorectal.  She would like her side pain looked into.     Review of Systems   Constitutional: Positive for unexpected weight change. Negative for activity change.   HENT: Negative for hearing loss, rhinorrhea and trouble swallowing.    Eyes: Negative for discharge and visual disturbance.   Respiratory: Negative for chest tightness and wheezing.    Cardiovascular: Negative for chest pain and palpitations.   Gastrointestinal: Negative for blood in stool, constipation, diarrhea and vomiting.   Endocrine: Negative for polydipsia and polyuria.   Genitourinary: Positive for flank pain. Negative for difficulty urinating, dysuria, hematuria and menstrual problem.   Musculoskeletal: Negative for arthralgias, joint swelling and neck pain.   Neurological: Negative for weakness and headaches.   Psychiatric/Behavioral: Negative for confusion and dysphoric mood.         Objective:      Physical Exam   Constitutional: She is oriented to person, place, and time. She appears well-developed and well-nourished.   HENT:   Head: Normocephalic and atraumatic.   Cardiovascular: Normal heart sounds.    Pulmonary/Chest: Breath sounds normal.   Abdominal: Soft. Bowel sounds are normal. She exhibits no distension. There is tenderness.   Neurological: She is alert and oriented to person, place, and time.   Vitals reviewed.        Assessment/Plan:     Right upper quadrant pain  -     US Abdomen Limited; Future; Expected date: 06/27/2018  -     Comprehensive metabolic panel; Future; Expected  date: 06/27/2018  -     Amylase; Future; Expected date: 06/27/2018  -     Lipase; Future; Expected date: 06/27/2018    Family history of colon cancer  -     Case request GI: COLONOSCOPY  -     sodium,potassium,mag sulfates (SUPREP BOWEL PREP KIT) 17.5-3.13-1.6 gram SolR; Take as directed  Dispense: 354 mL; Refill: 0    Colon cancer screening  -     Case request GI: COLONOSCOPY  -     sodium,potassium,mag sulfates (SUPREP BOWEL PREP KIT) 17.5-3.13-1.6 gram SolR; Take as directed  Dispense: 354 mL; Refill: 0    Muscle pain  -     cyclobenzaprine (FLEXERIL) 5 MG tablet; Take 1 tablet (5 mg total) by mouth 3 (three) times daily as needed for Muscle spasms.  Dispense: 12 tablet; Refill: 0    Other orders  -     Cancel: Case request GI: COLONOSCOPY          Follow-up if symptoms worsen or fail to improve.    Romana Rivera MD  [facility]   Family Medicine

## 2018-06-27 NOTE — LETTER
June 27, 2018      [facility]  [id number] [place] [id number]  Phone: [phone]  Fax: [phone]       Patient: Lia Ragsdale   YOB: 1965  Date of Visit: 06/27/2018      To Whom It May Concern:      Suzette Ragsdale  was at [facility] on 06/27/2018. She may return to work on tofay with no restrictions. If you have any questions or concerns, or if I can be of further assistance, please do not hesitate to contact me.    Sincerely,      Romana Rivera MD

## 2018-07-11 ENCOUNTER — TELEPHONE (OUTPATIENT)
Dept: INTERNAL MEDICINE | Facility: CLINIC | Age: 53
End: 2018-07-11

## 2018-07-11 NOTE — TELEPHONE ENCOUNTER
Notified pt of results and recommendations. Pt verbalized understanding. US is scheduled for Friday. Pt reports she will keep that appt.

## 2018-07-11 NOTE — TELEPHONE ENCOUNTER
----- Message from Romana Rivera MD sent at 6/28/2018 12:52 PM CDT -----  Labs normal except for an elevated lipase. I looked further into it and although increased this by some standards could still be in a normal range. With the amylase being normal not as concerned with the pancreas. Will follow up with imaging.

## 2018-07-12 ENCOUNTER — TELEPHONE (OUTPATIENT)
Dept: RADIOLOGY | Facility: HOSPITAL | Age: 53
End: 2018-07-12

## 2018-07-13 ENCOUNTER — HOSPITAL ENCOUNTER (OUTPATIENT)
Dept: RADIOLOGY | Facility: HOSPITAL | Age: 53
Discharge: HOME | End: 2018-07-13
Attending: FAMILY MEDICINE
Payer: COMMERCIAL

## 2018-07-13 DIAGNOSIS — R10.11 RIGHT UPPER QUADRANT PAIN: ICD-10-CM

## 2018-07-13 PROCEDURE — 76705 ECHO EXAM OF ABDOMEN: CPT | Mod: TC

## 2018-07-13 PROCEDURE — 76705 ECHO EXAM OF ABDOMEN: CPT | Mod: 26,,, | Performed by: RADIOLOGY

## 2018-08-06 ENCOUNTER — DOCUMENTATION ONLY (OUTPATIENT)
Dept: ENDOSCOPY | Facility: HOSPITAL | Age: 53
End: 2018-08-06

## 2018-11-16 DIAGNOSIS — K21.9 GASTROESOPHAGEAL REFLUX DISEASE WITHOUT ESOPHAGITIS: ICD-10-CM

## 2018-11-16 RX ORDER — PANTOPRAZOLE SODIUM 40 MG/1
TABLET, DELAYED RELEASE ORAL
Qty: 90 TABLET | Refills: 0 | Status: SHIPPED | OUTPATIENT
Start: 2018-11-16 | End: 2019-02-18 | Stop reason: SDUPTHER

## 2019-01-24 DIAGNOSIS — N32.89 BLADDER INSTABILITY: ICD-10-CM

## 2019-01-24 RX ORDER — SOLIFENACIN SUCCINATE 5 MG/1
TABLET, FILM COATED ORAL
Qty: 30 TABLET | Refills: 0 | Status: SHIPPED | OUTPATIENT
Start: 2019-01-24 | End: 2019-02-18 | Stop reason: SDUPTHER

## 2019-02-18 ENCOUNTER — OFFICE VISIT (OUTPATIENT)
Dept: INTERNAL MEDICINE | Facility: CLINIC | Age: 54
End: 2019-02-18
Payer: COMMERCIAL

## 2019-02-18 ENCOUNTER — LAB VISIT (OUTPATIENT)
Dept: LAB | Facility: HOSPITAL | Age: 54
End: 2019-02-18
Payer: COMMERCIAL

## 2019-02-18 VITALS
DIASTOLIC BLOOD PRESSURE: 76 MMHG | RESPIRATION RATE: 20 BRPM | BODY MASS INDEX: 29.92 KG/M2 | SYSTOLIC BLOOD PRESSURE: 128 MMHG | TEMPERATURE: 97 F | OXYGEN SATURATION: 98 % | HEART RATE: 87 BPM | WEIGHT: 175.25 LBS | HEIGHT: 64 IN

## 2019-02-18 DIAGNOSIS — Z12.11 COLON CANCER SCREENING: ICD-10-CM

## 2019-02-18 DIAGNOSIS — I10 ESSENTIAL HYPERTENSION: ICD-10-CM

## 2019-02-18 DIAGNOSIS — R74.8 ELEVATED LIPASE: ICD-10-CM

## 2019-02-18 DIAGNOSIS — K21.9 GASTROESOPHAGEAL REFLUX DISEASE WITHOUT ESOPHAGITIS: ICD-10-CM

## 2019-02-18 DIAGNOSIS — B00.1 COLD SORE: ICD-10-CM

## 2019-02-18 DIAGNOSIS — E78.00 ELEVATED CHOLESTEROL: ICD-10-CM

## 2019-02-18 DIAGNOSIS — N32.89 BLADDER INSTABILITY: ICD-10-CM

## 2019-02-18 DIAGNOSIS — Z00.00 ROUTINE PHYSICAL EXAMINATION: Primary | ICD-10-CM

## 2019-02-18 LAB
CHOLEST SERPL-MCNC: 206 MG/DL
CHOLEST/HDLC SERPL: 4.2 {RATIO}
HDLC SERPL-MCNC: 49 MG/DL
HDLC SERPL: 23.8 %
LDLC SERPL CALC-MCNC: 137.8 MG/DL
LIPASE SERPL-CCNC: 26 U/L
NONHDLC SERPL-MCNC: 157 MG/DL
TRIGL SERPL-MCNC: 96 MG/DL

## 2019-02-18 PROCEDURE — 3074F PR MOST RECENT SYSTOLIC BLOOD PRESSURE < 130 MM HG: ICD-10-PCS | Mod: CPTII,S$GLB,, | Performed by: FAMILY MEDICINE

## 2019-02-18 PROCEDURE — 99999 PR PBB SHADOW E&M-EST. PATIENT-LVL IV: CPT | Mod: PBBFAC,,, | Performed by: FAMILY MEDICINE

## 2019-02-18 PROCEDURE — 3078F PR MOST RECENT DIASTOLIC BLOOD PRESSURE < 80 MM HG: ICD-10-PCS | Mod: CPTII,S$GLB,, | Performed by: FAMILY MEDICINE

## 2019-02-18 PROCEDURE — 3074F SYST BP LT 130 MM HG: CPT | Mod: CPTII,S$GLB,, | Performed by: FAMILY MEDICINE

## 2019-02-18 PROCEDURE — 99396 PREV VISIT EST AGE 40-64: CPT | Mod: S$GLB,,, | Performed by: FAMILY MEDICINE

## 2019-02-18 PROCEDURE — 3078F DIAST BP <80 MM HG: CPT | Mod: CPTII,S$GLB,, | Performed by: FAMILY MEDICINE

## 2019-02-18 PROCEDURE — 83690 ASSAY OF LIPASE: CPT

## 2019-02-18 PROCEDURE — 36415 COLL VENOUS BLD VENIPUNCTURE: CPT

## 2019-02-18 PROCEDURE — 99396 PR PREVENTIVE VISIT,EST,40-64: ICD-10-PCS | Mod: S$GLB,,, | Performed by: FAMILY MEDICINE

## 2019-02-18 PROCEDURE — 99999 PR PBB SHADOW E&M-EST. PATIENT-LVL IV: ICD-10-PCS | Mod: PBBFAC,,, | Performed by: FAMILY MEDICINE

## 2019-02-18 PROCEDURE — 80061 LIPID PANEL: CPT

## 2019-02-18 RX ORDER — ACYCLOVIR 800 MG/1
TABLET ORAL
COMMUNITY
Start: 2019-02-07 | End: 2019-02-18 | Stop reason: SDUPTHER

## 2019-02-18 RX ORDER — PANTOPRAZOLE SODIUM 40 MG/1
40 TABLET, DELAYED RELEASE ORAL DAILY
Qty: 90 TABLET | Refills: 1 | Status: SHIPPED | OUTPATIENT
Start: 2019-02-18 | End: 2020-03-13 | Stop reason: SDUPTHER

## 2019-02-18 RX ORDER — SOLIFENACIN SUCCINATE 5 MG/1
5 TABLET, FILM COATED ORAL DAILY
Qty: 90 TABLET | Refills: 3 | Status: SHIPPED | OUTPATIENT
Start: 2019-02-18 | End: 2020-03-13 | Stop reason: SDUPTHER

## 2019-02-18 RX ORDER — AMLODIPINE BESYLATE 2.5 MG/1
2.5 TABLET ORAL DAILY
Qty: 90 TABLET | Refills: 3 | Status: SHIPPED | OUTPATIENT
Start: 2019-02-18 | End: 2020-03-13 | Stop reason: SDUPTHER

## 2019-02-18 RX ORDER — ACYCLOVIR 800 MG/1
800 TABLET ORAL 3 TIMES DAILY
Qty: 30 TABLET | Refills: 3 | Status: SHIPPED | OUTPATIENT
Start: 2019-02-18 | End: 2020-03-13 | Stop reason: SDUPTHER

## 2019-02-18 NOTE — PROGRESS NOTES
Subjective:       Patient ID: Lia Ragsdale is a 53 y.o. female.    Chief Complaint: Medication Refill    HPI Ms. Ragsdale presents today for medication refill.     She was seen 6 months ago with RUQ pain. She does not have this currently.   She plans to get colonoscopy in August. She has changed jobs now and is at the [employer] dept.     Sleeping better since her job change. She has been there a week.   Estroven has helped with her mood and agitation.     Review of Systems   Constitutional: Negative for activity change and unexpected weight change.   HENT: Negative for hearing loss, rhinorrhea and trouble swallowing.    Eyes: Negative for discharge and visual disturbance.   Respiratory: Negative for chest tightness and wheezing.    Cardiovascular: Negative for chest pain and palpitations.   Gastrointestinal: Negative for blood in stool, constipation, diarrhea and vomiting.   Endocrine: Negative for polydipsia and polyuria.   Genitourinary: Negative for difficulty urinating, dysuria, hematuria and menstrual problem.   Musculoskeletal: Negative for arthralgias, joint swelling and neck pain.   Neurological: Negative for weakness and headaches.   Psychiatric/Behavioral: Negative for confusion and dysphoric mood.         Past Medical History:   Diagnosis Date    Bladder instability 1/13/2015    Depression     History of depression in the past. Pt states it was from a stressful job.    Gallstones     General anesthetics causing adverse effect in therapeutic use     had respiratory distress after propofol    HPV (human papilloma virus) anogenital infection     Peptic ulcer disease      Objective:        Physical Exam   Constitutional: She is oriented to person, place, and time. She appears well-developed and well-nourished.   HENT:   Head: Normocephalic and atraumatic.   Eyes: EOM are normal.   Cardiovascular: Normal heart sounds.   Pulmonary/Chest: Breath sounds normal.   Neurological: She is alert and oriented  to person, place, and time.   Vitals reviewed.      Assessment/Plan:   Routine physical examination  Reviewed medical, social, surgical and family history for changes. Reviewed health maintenance.     Colon cancer screening  -     Case request GI: COLONOSCOPY    Cold sore  -     acyclovir (ZOVIRAX) 800 MG Tab; Take 1 tablet (800 mg total) by mouth 3 (three) times daily.  Dispense: 30 tablet; Refill: 3    Gastroesophageal reflux disease without esophagitis  -     pantoprazole (PROTONIX) 40 MG tablet; Take 1 tablet (40 mg total) by mouth once daily.  Dispense: 90 tablet; Refill: 1    Bladder instability  -     solifenacin (VESICARE) 5 MG tablet; Take 1 tablet (5 mg total) by mouth once daily.  Dispense: 90 tablet; Refill: 3    Essential hypertension-controlled  -     amLODIPine (NORVASC) 2.5 MG tablet; Take 1 tablet (2.5 mg total) by mouth once daily.  Dispense: 90 tablet; Refill: 3    Elevated lipase-rechecking blood work  -     Lipase; Future; Expected date: 02/18/2019    Elevated cholesterol  -     Lipid panel; Future; Expected date: 02/18/2019    Other orders  -     Cancel: Comprehensive metabolic panel; Future; Expected date: 02/18/2019  -     Cancel: Lipase; Future; Expected date: 02/18/2019      Follow-up in about 1 year (around 2/18/2020), or if symptoms worsen or fail to improve.    Romana Rivera MD  [facility]   Family Medicine

## 2019-02-22 DIAGNOSIS — N32.89 BLADDER INSTABILITY: ICD-10-CM

## 2019-02-22 RX ORDER — SOLIFENACIN SUCCINATE 5 MG/1
TABLET, FILM COATED ORAL
Qty: 30 TABLET | Refills: 0 | Status: SHIPPED | OUTPATIENT
Start: 2019-02-22 | End: 2019-04-28 | Stop reason: SDUPTHER

## 2019-02-26 ENCOUNTER — TELEPHONE (OUTPATIENT)
Dept: ENDOSCOPY | Facility: HOSPITAL | Age: 54
End: 2019-02-26

## 2019-02-27 ENCOUNTER — PATIENT MESSAGE (OUTPATIENT)
Dept: INTERNAL MEDICINE | Facility: CLINIC | Age: 54
End: 2019-02-27

## 2019-03-01 ENCOUNTER — OFFICE VISIT (OUTPATIENT)
Dept: OBSTETRICS AND GYNECOLOGY | Facility: CLINIC | Age: 54
End: 2019-03-01
Payer: COMMERCIAL

## 2019-03-01 VITALS
WEIGHT: 173.75 LBS | BODY MASS INDEX: 29.66 KG/M2 | HEIGHT: 64 IN | SYSTOLIC BLOOD PRESSURE: 128 MMHG | DIASTOLIC BLOOD PRESSURE: 78 MMHG

## 2019-03-01 DIAGNOSIS — Z78.0 MENOPAUSE: ICD-10-CM

## 2019-03-01 DIAGNOSIS — Z12.39 BREAST CANCER SCREENING: ICD-10-CM

## 2019-03-01 DIAGNOSIS — Z01.419 ENCOUNTER FOR GYNECOLOGICAL EXAMINATION WITHOUT ABNORMAL FINDING: Primary | ICD-10-CM

## 2019-03-01 DIAGNOSIS — N32.89 BLADDER INSTABILITY: ICD-10-CM

## 2019-03-01 PROCEDURE — 3078F PR MOST RECENT DIASTOLIC BLOOD PRESSURE < 80 MM HG: ICD-10-PCS | Mod: CPTII,S$GLB,, | Performed by: OBSTETRICS & GYNECOLOGY

## 2019-03-01 PROCEDURE — 3074F PR MOST RECENT SYSTOLIC BLOOD PRESSURE < 130 MM HG: ICD-10-PCS | Mod: CPTII,S$GLB,, | Performed by: OBSTETRICS & GYNECOLOGY

## 2019-03-01 PROCEDURE — 99999 PR PBB SHADOW E&M-EST. PATIENT-LVL III: ICD-10-PCS | Mod: PBBFAC,,, | Performed by: OBSTETRICS & GYNECOLOGY

## 2019-03-01 PROCEDURE — 3078F DIAST BP <80 MM HG: CPT | Mod: CPTII,S$GLB,, | Performed by: OBSTETRICS & GYNECOLOGY

## 2019-03-01 PROCEDURE — 3074F SYST BP LT 130 MM HG: CPT | Mod: CPTII,S$GLB,, | Performed by: OBSTETRICS & GYNECOLOGY

## 2019-03-01 PROCEDURE — 99396 PREV VISIT EST AGE 40-64: CPT | Mod: S$GLB,,, | Performed by: OBSTETRICS & GYNECOLOGY

## 2019-03-01 PROCEDURE — 99999 PR PBB SHADOW E&M-EST. PATIENT-LVL III: CPT | Mod: PBBFAC,,, | Performed by: OBSTETRICS & GYNECOLOGY

## 2019-03-01 PROCEDURE — 99396 PR PREVENTIVE VISIT,EST,40-64: ICD-10-PCS | Mod: S$GLB,,, | Performed by: OBSTETRICS & GYNECOLOGY

## 2019-03-01 NOTE — PROGRESS NOTES
Subjective:       Patient ID: Lia Ragsdale is a 53 y.o. female.    Chief Complaint:  Annual Exam      History of Present Illness  HPI  Patient presents to day for annual exam , postmenopausal.   No gyn complaints, no vaginal bleeding or pelvic pain noted.   Hormonal therapy reviewed and discussed. ON none  Taking Estroven otc with some improvement of mood.   HIstory of urge, well controled on Vesicare, will continue   Preventive screening exam indication and testing reviewed and discussed.  Pap every 3 years.   Mammogram annually     Health Maintenance   Topic Date Due    Colonoscopy  2017    Mammogram  2020    Pap Smear with HPV Cotest  2021    Lipid Panel  2024    TETANUS VACCINE  2027    Influenza Vaccine  Completed     GYN & OB History  Patient's last menstrual period was 2016.   Date of Last Pap: 2018    OB History    Para Term  AB Living   1 1 1     1   SAB TAB Ectopic Multiple Live Births                  # Outcome Date GA Lbr Lokesh/2nd Weight Sex Delivery Anes PTL Lv   1 Term      CS-Unspec             Review of Systems  Review of Systems   Constitutional: Negative for activity change, appetite change, chills, fatigue, fever and unexpected weight change.   HENT: Negative for mouth sores.    Eyes: Negative for visual disturbance.   Respiratory: Negative for cough and shortness of breath.    Cardiovascular: Negative for chest pain and palpitations.   Gastrointestinal: Negative for abdominal pain, bloating, blood in stool, constipation, diarrhea and nausea.   Genitourinary: Negative for decreased libido, dyspareunia, dysuria, frequency, genital sores, hematuria, pelvic pain, urgency, vaginal discharge, urinary incontinence, postcoital bleeding, postmenopausal bleeding and vaginal odor.   Musculoskeletal: Negative for back pain, joint swelling and myalgias.   Integumentary:  Negative for rash, breast mass, nipple discharge and breast skin changes.    Neurological: Negative for syncope, numbness and headaches.   Hematological: Negative for adenopathy. Does not bruise/bleed easily.   Psychiatric/Behavioral: Negative for depression and sleep disturbance. The patient is not nervous/anxious.    Breast: Negative for mass, mastodynia, nipple discharge and skin changes          Objective:   Physical Exam:   Constitutional: She appears well-developed and well-nourished. No distress.      Neck: No JVD present. No thyroid mass and no thyromegaly present.    Cardiovascular: Normal rate and regular rhythm.          Abdominal: Soft. Normal appearance and bowel sounds are normal. There is no hepatosplenomegaly. No hernia. Hernia confirmed negative in the ventral area, confirmed negative in the right inguinal area and confirmed negative in the left inguinal area.     Genitourinary: Rectum normal, vagina normal and uterus normal. There is no rash, tenderness, lesion or injury on the right labia. There is no rash, tenderness, lesion or injury on the left labia. Uterus is not deviated, not enlarged, not fixed, not tender and not experiencing uterine prolapse. Cervix is normal. Right adnexum displays no mass, no tenderness and no fullness. Left adnexum displays no mass, no tenderness and no fullness. No erythema, tenderness or bleeding in the vagina. No foreign body in the vagina. No vaginal discharge found. Labial bartholins normal.Cervix exhibits no motion tenderness, no discharge and no friability.                      Assessment:        1. Encounter for gynecological examination without abnormal finding    2. Breast cancer screening    3. Bladder instability    4. Menopause                Plan:            Lia was seen today for annual exam.    Diagnoses and all orders for this visit:    Encounter for gynecological examination without abnormal finding    Breast cancer screening  -     Mammo Digital Screening Bilat; Future    Bladder instability  Comments:  continue  Vesicare     Menopause

## 2019-03-19 LAB
A1C: 5.7
CHOL/HDLC RATIO: 4.4
CHOLEST SERPL-MSCNC: 230 MG/DL (ref 0–200)
FREE THYROXINE INDEX: 2.3
HDLC SERPL-MCNC: 52 MG/DL
LDLC SERPL CALC-MCNC: 163 MG/DL
Lab: 8.1
T3 UPTAKE: 28
TRIGLYCERIDE, BF: 77
TSH SERPL DL<=0.005 MIU/L-ACNC: 1.27 UIU/ML (ref 0.41–5.9)
VLDL CHOLESTEROL: 15 MG/DL

## 2019-03-20 ENCOUNTER — PATIENT MESSAGE (OUTPATIENT)
Dept: OBSTETRICS AND GYNECOLOGY | Facility: CLINIC | Age: 54
End: 2019-03-20

## 2019-04-01 ENCOUNTER — PATIENT MESSAGE (OUTPATIENT)
Dept: INTERNAL MEDICINE | Facility: CLINIC | Age: 54
End: 2019-04-01

## 2019-04-03 ENCOUNTER — DOCUMENTATION ONLY (OUTPATIENT)
Dept: ADMINISTRATIVE | Facility: HOSPITAL | Age: 54
End: 2019-04-03

## 2019-04-05 ENCOUNTER — HOSPITAL ENCOUNTER (OUTPATIENT)
Dept: RADIOLOGY | Facility: HOSPITAL | Age: 54
Discharge: HOME | End: 2019-04-05
Attending: OBSTETRICS & GYNECOLOGY
Payer: COMMERCIAL

## 2019-04-05 DIAGNOSIS — Z12.39 BREAST CANCER SCREENING: ICD-10-CM

## 2019-04-05 PROCEDURE — 77063 BREAST TOMOSYNTHESIS BI: CPT | Mod: 26,,, | Performed by: RADIOLOGY

## 2019-04-05 PROCEDURE — 77067 SCR MAMMO BI INCL CAD: CPT | Mod: 26,,, | Performed by: RADIOLOGY

## 2019-04-05 PROCEDURE — 77063 MAMMO DIGITAL SCREENING BILAT WITH TOMOSYNTHESIS_CAD: ICD-10-PCS | Mod: 26,,, | Performed by: RADIOLOGY

## 2019-04-05 PROCEDURE — 77067 MAMMO DIGITAL SCREENING BILAT WITH TOMOSYNTHESIS_CAD: ICD-10-PCS | Mod: 26,,, | Performed by: RADIOLOGY

## 2019-04-05 PROCEDURE — 77067 SCR MAMMO BI INCL CAD: CPT | Mod: TC

## 2019-04-27 DIAGNOSIS — N32.89 BLADDER INSTABILITY: ICD-10-CM

## 2019-04-28 RX ORDER — SOLIFENACIN SUCCINATE 5 MG/1
TABLET, FILM COATED ORAL
Qty: 30 TABLET | Refills: 0 | Status: SHIPPED | OUTPATIENT
Start: 2019-04-28 | End: 2020-03-13 | Stop reason: SDUPTHER

## 2019-05-09 DIAGNOSIS — K21.9 GASTROESOPHAGEAL REFLUX DISEASE WITHOUT ESOPHAGITIS: ICD-10-CM

## 2019-05-09 RX ORDER — PANTOPRAZOLE SODIUM 40 MG/1
TABLET, DELAYED RELEASE ORAL
Qty: 90 TABLET | Refills: 0 | Status: SHIPPED | OUTPATIENT
Start: 2019-05-09 | End: 2020-01-28

## 2019-07-17 LAB
CHOLEST SERPL-MSCNC: 245 MG/DL (ref 0–200)
HDLC SERPL-MCNC: 51 MG/DL
HIV AG/AB 4TH GEN: NON REACTIVE
LDL/HDL RATIO: 3.4
LDLC SERPL CALC-MCNC: 173 MG/DL (ref 0–160)
TRIGL SERPL-MCNC: 104 MG/DL
VLDLC SERPL-MCNC: 21 MG/DL

## 2020-01-28 DIAGNOSIS — K21.9 GASTROESOPHAGEAL REFLUX DISEASE WITHOUT ESOPHAGITIS: ICD-10-CM

## 2020-01-28 RX ORDER — PANTOPRAZOLE SODIUM 40 MG/1
TABLET, DELAYED RELEASE ORAL
Qty: 90 TABLET | Refills: 0 | Status: SHIPPED | OUTPATIENT
Start: 2020-01-28 | End: 2020-03-13

## 2020-03-03 LAB
CHOL/HDLC RATIO: 4.4
CHOLEST SERPL-MSCNC: 225 MG/DL (ref 0–200)
HDLC SERPL-MCNC: 51 MG/DL
LDLC SERPL CALC-MCNC: 161 MG/DL
T3 UPTAKE: 28
T4 FREE SERPL-MCNC: 8.3 NG/DL
TRIGL SERPL-MCNC: 63 MG/DL
TSH SERPL DL<=0.005 MIU/L-ACNC: 1.89 UIU/ML (ref 0.41–5.9)
VLDL CHOLESTEROL: 13 MG/DL

## 2020-03-11 ENCOUNTER — PATIENT OUTREACH (OUTPATIENT)
Dept: ADMINISTRATIVE | Facility: OTHER | Age: 55
End: 2020-03-11

## 2020-03-13 ENCOUNTER — OFFICE VISIT (OUTPATIENT)
Dept: INTERNAL MEDICINE | Facility: CLINIC | Age: 55
End: 2020-03-13
Payer: COMMERCIAL

## 2020-03-13 ENCOUNTER — OFFICE VISIT (OUTPATIENT)
Dept: OBSTETRICS AND GYNECOLOGY | Facility: CLINIC | Age: 55
End: 2020-03-13
Payer: COMMERCIAL

## 2020-03-13 VITALS
OXYGEN SATURATION: 97 % | WEIGHT: 172.38 LBS | DIASTOLIC BLOOD PRESSURE: 82 MMHG | BODY MASS INDEX: 29.59 KG/M2 | TEMPERATURE: 99 F | SYSTOLIC BLOOD PRESSURE: 132 MMHG | HEART RATE: 88 BPM

## 2020-03-13 VITALS
HEIGHT: 64 IN | DIASTOLIC BLOOD PRESSURE: 78 MMHG | WEIGHT: 173.06 LBS | SYSTOLIC BLOOD PRESSURE: 140 MMHG | BODY MASS INDEX: 29.55 KG/M2

## 2020-03-13 DIAGNOSIS — Z78.0 MENOPAUSE: ICD-10-CM

## 2020-03-13 DIAGNOSIS — E78.00 ELEVATED CHOLESTEROL: ICD-10-CM

## 2020-03-13 DIAGNOSIS — B00.1 COLD SORE: ICD-10-CM

## 2020-03-13 DIAGNOSIS — Z01.419 ENCOUNTER FOR GYNECOLOGICAL EXAMINATION WITHOUT ABNORMAL FINDING: Primary | ICD-10-CM

## 2020-03-13 DIAGNOSIS — N32.89 BLADDER INSTABILITY: ICD-10-CM

## 2020-03-13 DIAGNOSIS — Z12.11 COLON CANCER SCREENING: ICD-10-CM

## 2020-03-13 DIAGNOSIS — R09.82 POSTNASAL DRIP: ICD-10-CM

## 2020-03-13 DIAGNOSIS — Z00.00 ROUTINE PHYSICAL EXAMINATION: Primary | ICD-10-CM

## 2020-03-13 DIAGNOSIS — I10 ESSENTIAL HYPERTENSION: ICD-10-CM

## 2020-03-13 DIAGNOSIS — K21.9 GASTROESOPHAGEAL REFLUX DISEASE WITHOUT ESOPHAGITIS: ICD-10-CM

## 2020-03-13 DIAGNOSIS — Z12.31 BREAST CANCER SCREENING BY MAMMOGRAM: ICD-10-CM

## 2020-03-13 PROCEDURE — 3075F PR MOST RECENT SYSTOLIC BLOOD PRESS GE 130-139MM HG: ICD-10-PCS | Mod: CPTII,S$GLB,, | Performed by: FAMILY MEDICINE

## 2020-03-13 PROCEDURE — 3078F PR MOST RECENT DIASTOLIC BLOOD PRESSURE < 80 MM HG: ICD-10-PCS | Mod: CPTII,S$GLB,, | Performed by: OBSTETRICS & GYNECOLOGY

## 2020-03-13 PROCEDURE — 3078F DIAST BP <80 MM HG: CPT | Mod: CPTII,S$GLB,, | Performed by: OBSTETRICS & GYNECOLOGY

## 2020-03-13 PROCEDURE — 3079F PR MOST RECENT DIASTOLIC BLOOD PRESSURE 80-89 MM HG: ICD-10-PCS | Mod: CPTII,S$GLB,, | Performed by: FAMILY MEDICINE

## 2020-03-13 PROCEDURE — 99999 PR PBB SHADOW E&M-EST. PATIENT-LVL III: ICD-10-PCS | Mod: PBBFAC,,, | Performed by: FAMILY MEDICINE

## 2020-03-13 PROCEDURE — 99396 PR PREVENTIVE VISIT,EST,40-64: ICD-10-PCS | Mod: S$GLB,,, | Performed by: OBSTETRICS & GYNECOLOGY

## 2020-03-13 PROCEDURE — 99396 PR PREVENTIVE VISIT,EST,40-64: ICD-10-PCS | Mod: S$GLB,,, | Performed by: FAMILY MEDICINE

## 2020-03-13 PROCEDURE — 99999 PR PBB SHADOW E&M-EST. PATIENT-LVL III: ICD-10-PCS | Mod: PBBFAC,,, | Performed by: OBSTETRICS & GYNECOLOGY

## 2020-03-13 PROCEDURE — 99396 PREV VISIT EST AGE 40-64: CPT | Mod: S$GLB,,, | Performed by: FAMILY MEDICINE

## 2020-03-13 PROCEDURE — 3074F PR MOST RECENT SYSTOLIC BLOOD PRESSURE < 130 MM HG: ICD-10-PCS | Mod: CPTII,S$GLB,, | Performed by: OBSTETRICS & GYNECOLOGY

## 2020-03-13 PROCEDURE — 3075F SYST BP GE 130 - 139MM HG: CPT | Mod: CPTII,S$GLB,, | Performed by: FAMILY MEDICINE

## 2020-03-13 PROCEDURE — 99999 PR PBB SHADOW E&M-EST. PATIENT-LVL III: CPT | Mod: PBBFAC,,, | Performed by: FAMILY MEDICINE

## 2020-03-13 PROCEDURE — 99999 PR PBB SHADOW E&M-EST. PATIENT-LVL III: CPT | Mod: PBBFAC,,, | Performed by: OBSTETRICS & GYNECOLOGY

## 2020-03-13 PROCEDURE — 3074F SYST BP LT 130 MM HG: CPT | Mod: CPTII,S$GLB,, | Performed by: OBSTETRICS & GYNECOLOGY

## 2020-03-13 PROCEDURE — 3079F DIAST BP 80-89 MM HG: CPT | Mod: CPTII,S$GLB,, | Performed by: FAMILY MEDICINE

## 2020-03-13 PROCEDURE — 99396 PREV VISIT EST AGE 40-64: CPT | Mod: S$GLB,,, | Performed by: OBSTETRICS & GYNECOLOGY

## 2020-03-13 RX ORDER — ACYCLOVIR 800 MG/1
800 TABLET ORAL 3 TIMES DAILY
Qty: 30 TABLET | Refills: 3 | Status: ON HOLD | OUTPATIENT
Start: 2020-03-13 | End: 2021-11-01 | Stop reason: HOSPADM

## 2020-03-13 RX ORDER — AMLODIPINE BESYLATE 2.5 MG/1
2.5 TABLET ORAL DAILY
Qty: 90 TABLET | Refills: 3 | Status: SHIPPED | OUTPATIENT
Start: 2020-03-13 | End: 2020-03-14

## 2020-03-13 RX ORDER — PANTOPRAZOLE SODIUM 40 MG/1
40 TABLET, DELAYED RELEASE ORAL DAILY
Qty: 90 TABLET | Refills: 3 | Status: SHIPPED | OUTPATIENT
Start: 2020-03-13 | End: 2021-04-30 | Stop reason: SDUPTHER

## 2020-03-13 RX ORDER — TIZANIDINE 4 MG/1
4 TABLET ORAL EVERY 6 HOURS PRN
Qty: 30 TABLET | Refills: 0 | Status: SHIPPED | OUTPATIENT
Start: 2020-03-13 | End: 2020-03-23

## 2020-03-13 RX ORDER — FLUTICASONE PROPIONATE 50 MCG
1 SPRAY, SUSPENSION (ML) NASAL DAILY
Qty: 18.2 ML | Refills: 0 | Status: SHIPPED | OUTPATIENT
Start: 2020-03-13 | End: 2021-03-17

## 2020-03-13 RX ORDER — SOLIFENACIN SUCCINATE 5 MG/1
5 TABLET, FILM COATED ORAL DAILY
Qty: 90 TABLET | Refills: 3 | Status: SHIPPED | OUTPATIENT
Start: 2020-03-13 | End: 2020-03-14

## 2020-03-13 RX ORDER — SOLIFENACIN SUCCINATE 5 MG/1
5 TABLET, FILM COATED ORAL DAILY
Qty: 30 TABLET | Refills: 11 | Status: SHIPPED | OUTPATIENT
Start: 2020-03-13 | End: 2021-03-17

## 2020-03-13 RX ORDER — PRAVASTATIN SODIUM 10 MG/1
10 TABLET ORAL DAILY
Qty: 90 TABLET | Refills: 3 | Status: SHIPPED | OUTPATIENT
Start: 2020-03-13 | End: 2021-03-17

## 2020-03-13 RX ORDER — ASCORBIC ACID 500 MG
500 TABLET ORAL
COMMUNITY
End: 2021-03-17

## 2020-03-13 NOTE — PROGRESS NOTES
Lia Ragsdale  03/14/2020  [id number]    Romana Rivera MD  Patient Care Team:  Romana Rivera MD as PCP - General (Internal Medicine)  Aby Delgado LPN as Care Coordinator (Internal Medicine)    Has the patient seen any provider outside of the network since the last visit ? (no). If yes, HIPPA forms completed and records requested.      Visit Type:a scheduled routine follow-up visit    Chief Complaint:  No chief complaint on file.      History of Present Illness:  HPI Ms. Ragsdale presents today for routine annual exam.   She has not had a change in medical history.     Pain in the right lower abdomen  Years ago US negative  It flares from time to time  Nagging hurting feeling     Notes when drinking bottled water vs well water she does not have the pain      Review of Systems   Constitutional: Negative for chills and fever.   HENT: Negative for congestion and tinnitus.    Eyes: Negative for blurred vision, pain and discharge.   Respiratory: Negative for cough and wheezing.    Cardiovascular: Negative for chest pain, palpitations, orthopnea and leg swelling.   Gastrointestinal: Positive for abdominal pain. Negative for blood in stool, constipation, diarrhea, heartburn, nausea and vomiting.   Genitourinary: Negative for dysuria, flank pain, frequency, hematuria and urgency.   Skin: Negative for itching and rash.   Neurological: Negative for dizziness, tingling and headaches.   Psychiatric/Behavioral: Negative for depression.       Screening Questionnaires:    In the last two weeks how often have you felt down, depressed, or hopeless ( no )    In the last two weeks how often have you had little interest or pleasure in doing  (no )    In the last two weeks how often have you been bothered by the following problems:  1. Feeling nervous, anxious, or on edge ( intermittent )    2. Not being able to stop or control worrying ( no)    3. Worrying too much about different things ( no)    4. Trouble relaxing ( no  )    5. Being so restless that it is hard to sit still  (no )    6. Becoming easily annoyed or irritable (frequent)    7. Feeling afraid as if something awful might happen (no )    How often do you have a drink containing Alcohol? occasional, social use     Do you exercise  (no ) moderately active    Do you take a baby Aspirin daily ( no)    Do you have an advance directive ( no ) The patient was given information regarding Living Will/Durable Power-of- if requested.     The following were reviewed: Active problem list, medication list, allergies, family history, social history, and Health Maintenance.     History:  Past Medical History:   Diagnosis Date    Bladder instability 2015    Depression     History of depression in the past. Pt states it was from a stressful job.    Gallstones     General anesthetics causing adverse effect in therapeutic use     had respiratory distress after propofol    HPV (human papilloma virus) anogenital infection     Peptic ulcer disease      Past Surgical History:   Procedure Laterality Date     SECTION, LOW TRANSVERSE      x 1    CHOLECYSTECTOMY      TUBAL LIGATION       Family History   Problem Relation Age of Onset    Colon cancer Father     Diabetes Mother     Hypertension Mother     Hypertension Sister     Breast cancer Sister 54        Stage 1     Social History     Socioeconomic History    Marital status:      Spouse name: Not on file    Number of children: Not on file    Years of education: Not on file    Highest education level: Not on file   Occupational History    Not on file   Social Needs    Financial resource strain: Not on file    Food insecurity:     Worry: Not on file     Inability: Not on file    Transportation needs:     Medical: Not on file     Non-medical: Not on file   Tobacco Use    Smoking status: Never Smoker    Smokeless tobacco: Never Used   Substance and Sexual Activity    Alcohol use: Yes     Comment:  Socially drinks    Drug use: No    Sexual activity: Yes     Partners: Male   Lifestyle    Physical activity:     Days per week: Not on file     Minutes per session: Not on file    Stress: Not on file   Relationships    Social connections:     Talks on phone: Not on file     Gets together: Not on file     Attends Mosque service: Not on file     Active member of club or organization: Not on file     Attends meetings of clubs or organizations: Not on file     Relationship status: Not on file   Other Topics Concern    Not on file   Social History Narrative    Not on file     Patient Active Problem List   Diagnosis    Bladder instability     Review of patient's allergies indicates:  No Known Allergies    Health Maintenance  Health Maintenance Topics with due status: Not Due       Topic Last Completion Date    TETANUS VACCINE 04/12/2017    Pap Smear with HPV Cotest 02/02/2018    Mammogram 04/05/2019    Lipid Panel 03/03/2020     Health Maintenance Due   Topic Date Due    HIV Screening  05/04/1982    Colonoscopy  03/16/2017    Shingles Vaccine (1 of 2) 05/04/2017       Medications:  Current Outpatient Medications on File Prior to Visit   Medication Sig Dispense Refill    cetirizine (ZYRTEC) 10 MG tablet       clotrimazole-betamethasone 1-0.05% (LOTRISONE) cream Apply 15 g topically 2 (two) times daily. 15 g 0    multivit,min/folic acid/lqt057 (ESTROVEN MAXIMUM STRENGTH ORAL)       sodium,potassium,mag sulfates (SUPREP BOWEL PREP KIT) 17.5-3.13-1.6 gram SolR Take as directed 354 mL 0     No current facility-administered medications on file prior to visit.        Medications have been reviewed and reconciled with patient at visit today.    Barriers to medications present (no )    Adverse reactions to current medications (no)    Over the counter medications reviewed (Yes) and if needed added to active Medication list.    Exam:  Vitals:    03/13/20 1101   BP: 132/82   Pulse: 88   Temp: 98.8 °F (37.1 °C)      Weight: 78.2 kg (172 lb 6.4 oz)   Body mass index is 29.59 kg/m².      Physical Exam    Laboratory Reviewed: (Yes)  Lab Results   Component Value Date    WBC 6.11 02/16/2017    HGB 12.0 02/16/2017    HCT 38.2 02/16/2017     02/16/2017    CHOL 225 (A) 03/03/2020    TRIG 63 03/03/2020    HDL 51 03/03/2020    ALT 10 06/27/2018    AST 16 06/27/2018     06/27/2018    K 4.3 06/27/2018     06/27/2018    CREATININE 0.8 06/27/2018    BUN 9 06/27/2018    CO2 29 06/27/2018    TSH 1.89 03/03/2020    INR 1.0 12/22/2011    GLUF 90 12/06/2011    HGBA1C 5.7 01/29/2008       Assessment:  The primary encounter diagnosis was Routine physical examination. Diagnoses of Bladder instability, Gastroesophageal reflux disease without esophagitis, Essential hypertension, Cold sore, Elevated cholesterol, Postnasal drip, and Colon cancer screening were also pertinent to this visit.    Plan:  Routine physical examination  Reviewed medical, social, surgical and family history. Reviewed health maintenance. Addressed concerns today-mostly chronic issues    Bladder instability-refilled medication  -     solifenacin (VESICARE) 5 MG tablet; Take 1 tablet (5 mg total) by mouth once daily.  Dispense: 90 tablet; Refill: 3    Gastroesophageal reflux disease without esophagitis-refilled medication  -     pantoprazole (PROTONIX) 40 MG tablet; Take 1 tablet (40 mg total) by mouth once daily.  Dispense: 90 tablet; Refill: 3    Essential hypertension-controlled  -     amLODIPine (NORVASC) 2.5 MG tablet; Take 1 tablet (2.5 mg total) by mouth once daily.  Dispense: 90 tablet; Refill: 3    Cold sore-sporadic-refilled medication  -     acyclovir (ZOVIRAX) 800 MG Tab; Take 1 tablet (800 mg total) by mouth 3 (three) times daily.  Dispense: 30 tablet; Refill: 3    Elevated cholesterol-new medication  -     pravastatin (PRAVACHOL) 10 MG tablet; Take 1 tablet (10 mg total) by mouth once daily.  Dispense: 90 tablet; Refill: 3    Postnasal drip  -      fluticasone propionate (FLONASE) 50 mcg/actuation nasal spray; 1 spray (50 mcg total) by Each Nostril route once daily.  Dispense: 18.2 mL; Refill: 0    Colon cancer screening  -     Case request GI: COLONOSCOPY    Other orders  -     tiZANidine (ZANAFLEX) 4 MG tablet; Take 1 tablet (4 mg total) by mouth every 6 (six) hours as needed.  Dispense: 30 tablet; Refill: 0        -Patient's lab results were reviewed and discussed with patient  -Treatment options and alternatives were discussed with the patient. Patient expressed understanding. Patient was given the opportunity to ask questions and be an active participant in their medical care. Patient had no further questions or concerns at this time.   -Documentation of patient's health and condition was obtained from family member who was present during visit.  -Patient is an overall moderate risk for health complications from their medical conditions.     Follow up: follow up as needed       Care Plan/Goals: Reviewed N/A  Goals    None             After visit summary printed and given to patient upon discharge.  Patient goals and care plan are included in After visit summary.

## 2020-03-13 NOTE — PROGRESS NOTES
Subjective:       Patient ID: Lia Ragsdale is a 54 y.o. female.    Chief Complaint:  Annual Exam      History of Present Illness  HPI  Patient presents to day for annual exam , postmenopausal.   No gyn complaints, no vaginal bleeding or pelvic pain noted.   Continue to use Vesicare for Urge with relief and would like to continue   Hormonal therapy reviewed and discussed. Takes Estroven with relief otc   Preventive screening exam indication and testing reviewed and discussed.    Health Maintenance   Topic Date Due    Colonoscopy  2017    Pap Smear with HPV Cotest  2021    Mammogram  2021    Lipid Panel  2025    TETANUS VACCINE  2027     GYN & OB History  Patient's last menstrual period was 2016.   Date of Last Pap: 2018    OB History    Para Term  AB Living   1 1 1     1   SAB TAB Ectopic Multiple Live Births                  # Outcome Date GA Lbr Lokesh/2nd Weight Sex Delivery Anes PTL Lv   1 Term      CS-Unspec          Review of Systems  Review of Systems   Constitutional: Positive for activity change. Negative for appetite change, chills, fatigue, fever and unexpected weight change.   HENT: Negative for mouth sores.    Eyes: Negative for visual disturbance.   Respiratory: Negative for cough and shortness of breath.    Cardiovascular: Negative for chest pain and palpitations.   Gastrointestinal: Negative for abdominal pain, bloating, blood in stool, constipation, diarrhea and nausea.   Genitourinary: Negative for decreased libido, dyspareunia, dysuria, frequency, genital sores, hematuria, pelvic pain, urgency, vaginal discharge, urinary incontinence, postcoital bleeding, postmenopausal bleeding and vaginal odor.   Musculoskeletal: Negative for back pain, joint swelling and myalgias.   Integumentary:  Negative for rash, breast mass, nipple discharge and breast skin changes.   Neurological: Negative for syncope, numbness and headaches.   Hematological:  Negative for adenopathy. Does not bruise/bleed easily.   Psychiatric/Behavioral: Negative for depression and sleep disturbance. The patient is not nervous/anxious.    Breast: Negative for mass, mastodynia, nipple discharge and skin changes          Objective:   Physical Exam:   Constitutional: She appears well-developed and well-nourished. No distress.      Neck: No JVD present. No thyroid mass and no thyromegaly present.    Cardiovascular: Normal rate and regular rhythm.          Abdominal: Soft. Normal appearance and bowel sounds are normal. There is no hepatosplenomegaly. No hernia. Hernia confirmed negative in the ventral area, confirmed negative in the right inguinal area and confirmed negative in the left inguinal area.     Genitourinary: Rectum normal, vagina normal and uterus normal. There is no rash, tenderness, lesion or injury on the right labia. There is no rash, tenderness, lesion or injury on the left labia. Uterus is not deviated, not enlarged, not fixed, not tender and not experiencing uterine prolapse. Cervix is normal. Right adnexum displays no mass, no tenderness and no fullness. Left adnexum displays no mass, no tenderness and no fullness. No erythema, tenderness or bleeding in the vagina. No foreign body in the vagina. No vaginal discharge found. Labial bartholins normal.Cervix exhibits no motion tenderness, no discharge and no friability.                      Assessment:        1. Encounter for gynecological examination without abnormal finding    2. Breast cancer screening by mammogram    3. Bladder instability    4. Menopause                Plan:            Lia was seen today for annual exam.    Diagnoses and all orders for this visit:    Encounter for gynecological examination without abnormal finding    Breast cancer screening by mammogram  -     Mammo Digital Screening Bilat With CAD; Future    Bladder instability  -     solifenacin (VESICARE) 5 MG tablet; Take 1 tablet (5 mg total) by  mouth once daily.    Menopause

## 2020-03-14 DIAGNOSIS — I10 ESSENTIAL HYPERTENSION: ICD-10-CM

## 2020-03-14 DIAGNOSIS — N32.89 BLADDER INSTABILITY: ICD-10-CM

## 2020-03-14 RX ORDER — AMLODIPINE BESYLATE 2.5 MG/1
TABLET ORAL
Qty: 90 TABLET | Refills: 3 | Status: SHIPPED | OUTPATIENT
Start: 2020-03-14 | End: 2021-03-17

## 2020-03-14 RX ORDER — SOLIFENACIN SUCCINATE 5 MG/1
TABLET, FILM COATED ORAL
Qty: 90 TABLET | Refills: 3 | Status: SHIPPED | OUTPATIENT
Start: 2020-03-14 | End: 2021-03-17

## 2020-03-16 ENCOUNTER — TELEPHONE (OUTPATIENT)
Dept: ENDOSCOPY | Facility: HOSPITAL | Age: 55
End: 2020-03-16

## 2020-04-27 ENCOUNTER — PATIENT MESSAGE (OUTPATIENT)
Dept: ADMINISTRATIVE | Facility: OTHER | Age: 55
End: 2020-04-27

## 2020-04-30 ENCOUNTER — TELEPHONE (OUTPATIENT)
Dept: RADIOLOGY | Facility: HOSPITAL | Age: 55
End: 2020-04-30

## 2020-05-27 ENCOUNTER — PATIENT MESSAGE (OUTPATIENT)
Dept: INTERNAL MEDICINE | Facility: CLINIC | Age: 55
End: 2020-05-27

## 2020-08-14 ENCOUNTER — HOSPITAL ENCOUNTER (OUTPATIENT)
Dept: RADIOLOGY | Facility: HOSPITAL | Age: 55
Discharge: HOME | End: 2020-08-14
Attending: OBSTETRICS & GYNECOLOGY
Payer: COMMERCIAL

## 2020-08-14 VITALS — WEIGHT: 173.06 LBS | HEIGHT: 64 IN | BODY MASS INDEX: 29.55 KG/M2

## 2020-08-14 DIAGNOSIS — Z12.31 BREAST CANCER SCREENING BY MAMMOGRAM: ICD-10-CM

## 2020-08-14 PROCEDURE — 77067 SCR MAMMO BI INCL CAD: CPT | Mod: 26,,, | Performed by: RADIOLOGY

## 2020-08-14 PROCEDURE — 77063 MAMMO DIGITAL SCREENING BILAT WITH TOMOSYNTHESIS_CAD: ICD-10-PCS | Mod: 26,,, | Performed by: RADIOLOGY

## 2020-08-14 PROCEDURE — 77067 MAMMO DIGITAL SCREENING BILAT WITH TOMOSYNTHESIS_CAD: ICD-10-PCS | Mod: 26,,, | Performed by: RADIOLOGY

## 2020-08-14 PROCEDURE — 77063 BREAST TOMOSYNTHESIS BI: CPT | Mod: 26,,, | Performed by: RADIOLOGY

## 2020-08-14 PROCEDURE — 77067 SCR MAMMO BI INCL CAD: CPT | Mod: TC

## 2020-09-25 ENCOUNTER — PATIENT MESSAGE (OUTPATIENT)
Dept: OTHER | Facility: OTHER | Age: 55
End: 2020-09-25

## 2020-09-29 ENCOUNTER — PATIENT MESSAGE (OUTPATIENT)
Dept: INTERNAL MEDICINE | Facility: CLINIC | Age: 55
End: 2020-09-29

## 2020-09-29 RX ORDER — FLUCONAZOLE 150 MG/1
150 TABLET ORAL DAILY
Qty: 1 TABLET | Refills: 0 | Status: SHIPPED | OUTPATIENT
Start: 2020-09-29 | End: 2020-09-30

## 2020-11-16 ENCOUNTER — PATIENT MESSAGE (OUTPATIENT)
Dept: INTERNAL MEDICINE | Facility: CLINIC | Age: 55
End: 2020-11-16

## 2020-11-18 ENCOUNTER — PATIENT MESSAGE (OUTPATIENT)
Dept: INTERNAL MEDICINE | Facility: CLINIC | Age: 55
End: 2020-11-18

## 2020-11-18 DIAGNOSIS — Z12.11 COLON CANCER SCREENING: Primary | ICD-10-CM

## 2020-12-11 ENCOUNTER — PATIENT MESSAGE (OUTPATIENT)
Dept: OTHER | Facility: OTHER | Age: 55
End: 2020-12-11

## 2020-12-13 ENCOUNTER — PATIENT MESSAGE (OUTPATIENT)
Dept: INTERNAL MEDICINE | Facility: CLINIC | Age: 55
End: 2020-12-13

## 2021-03-16 LAB
A1C EXTERNAL: 5.1
CHOL/HDLC RATIO: 4.2
CHOLEST SERPL-MSCNC: 223 MG/DL (ref 0–200)
HDLC SERPL-MCNC: 53 MG/DL
LDLC SERPL CALC-MCNC: 157 MG/DL (ref 0–160)
Lab: 2.2
T3 UPTAKE: 26
T4,THYROXINE: 8.4 UG/DL
TRIGL SERPL-MCNC: 73 MG/DL
TSH SERPL DL<=0.005 MIU/L-ACNC: 1.71 UIU/ML (ref 0.41–5.9)
VLDLC SERPL-MCNC: 13 MG/DL

## 2021-03-17 ENCOUNTER — OFFICE VISIT (OUTPATIENT)
Dept: INTERNAL MEDICINE | Facility: CLINIC | Age: 56
End: 2021-03-17
Payer: COMMERCIAL

## 2021-03-17 VITALS
DIASTOLIC BLOOD PRESSURE: 68 MMHG | HEIGHT: 64 IN | TEMPERATURE: 97 F | BODY MASS INDEX: 29.55 KG/M2 | HEART RATE: 74 BPM | OXYGEN SATURATION: 98 % | SYSTOLIC BLOOD PRESSURE: 112 MMHG | RESPIRATION RATE: 18 BRPM | WEIGHT: 173.06 LBS

## 2021-03-17 DIAGNOSIS — I10 ESSENTIAL HYPERTENSION: ICD-10-CM

## 2021-03-17 DIAGNOSIS — Z00.00 ROUTINE PHYSICAL EXAMINATION: Primary | ICD-10-CM

## 2021-03-17 DIAGNOSIS — Z12.11 COLON CANCER SCREENING: ICD-10-CM

## 2021-03-17 PROCEDURE — 3008F PR BODY MASS INDEX (BMI) DOCUMENTED: ICD-10-PCS | Mod: CPTII,S$GLB,, | Performed by: FAMILY MEDICINE

## 2021-03-17 PROCEDURE — 99999 PR PBB SHADOW E&M-EST. PATIENT-LVL III: ICD-10-PCS | Mod: PBBFAC,,, | Performed by: FAMILY MEDICINE

## 2021-03-17 PROCEDURE — 99396 PREV VISIT EST AGE 40-64: CPT | Mod: S$GLB,,, | Performed by: FAMILY MEDICINE

## 2021-03-17 PROCEDURE — 3074F SYST BP LT 130 MM HG: CPT | Mod: CPTII,S$GLB,, | Performed by: FAMILY MEDICINE

## 2021-03-17 PROCEDURE — 99999 PR PBB SHADOW E&M-EST. PATIENT-LVL III: CPT | Mod: PBBFAC,,, | Performed by: FAMILY MEDICINE

## 2021-03-17 PROCEDURE — 3074F PR MOST RECENT SYSTOLIC BLOOD PRESSURE < 130 MM HG: ICD-10-PCS | Mod: CPTII,S$GLB,, | Performed by: FAMILY MEDICINE

## 2021-03-17 PROCEDURE — 99396 PR PREVENTIVE VISIT,EST,40-64: ICD-10-PCS | Mod: S$GLB,,, | Performed by: FAMILY MEDICINE

## 2021-03-17 PROCEDURE — 1126F PR PAIN SEVERITY QUANTIFIED, NO PAIN PRESENT: ICD-10-PCS | Mod: S$GLB,,, | Performed by: FAMILY MEDICINE

## 2021-03-17 PROCEDURE — 1126F AMNT PAIN NOTED NONE PRSNT: CPT | Mod: S$GLB,,, | Performed by: FAMILY MEDICINE

## 2021-03-17 PROCEDURE — 3078F PR MOST RECENT DIASTOLIC BLOOD PRESSURE < 80 MM HG: ICD-10-PCS | Mod: CPTII,S$GLB,, | Performed by: FAMILY MEDICINE

## 2021-03-17 PROCEDURE — 3008F BODY MASS INDEX DOCD: CPT | Mod: CPTII,S$GLB,, | Performed by: FAMILY MEDICINE

## 2021-03-17 PROCEDURE — 3078F DIAST BP <80 MM HG: CPT | Mod: CPTII,S$GLB,, | Performed by: FAMILY MEDICINE

## 2021-03-24 ENCOUNTER — PATIENT OUTREACH (OUTPATIENT)
Dept: ADMINISTRATIVE | Facility: HOSPITAL | Age: 56
End: 2021-03-24

## 2021-03-31 ENCOUNTER — CLINICAL SUPPORT (OUTPATIENT)
Dept: INTERNAL MEDICINE | Facility: CLINIC | Age: 56
End: 2021-03-31
Payer: COMMERCIAL

## 2021-03-31 VITALS — SYSTOLIC BLOOD PRESSURE: 126 MMHG | HEART RATE: 72 BPM | DIASTOLIC BLOOD PRESSURE: 74 MMHG

## 2021-03-31 PROCEDURE — 99999 PR PBB SHADOW E&M-EST. PATIENT-LVL II: CPT | Mod: PBBFAC,,,

## 2021-03-31 PROCEDURE — 99999 PR PBB SHADOW E&M-EST. PATIENT-LVL II: ICD-10-PCS | Mod: PBBFAC,,,

## 2021-04-27 DIAGNOSIS — K21.9 GASTROESOPHAGEAL REFLUX DISEASE WITHOUT ESOPHAGITIS: ICD-10-CM

## 2021-04-27 DIAGNOSIS — B00.1 COLD SORE: Primary | ICD-10-CM

## 2021-04-27 DIAGNOSIS — Z79.899 ENCOUNTER FOR LONG-TERM (CURRENT) USE OF OTHER MEDICATIONS: ICD-10-CM

## 2021-04-30 DIAGNOSIS — K21.9 GASTROESOPHAGEAL REFLUX DISEASE WITHOUT ESOPHAGITIS: ICD-10-CM

## 2021-04-30 RX ORDER — PANTOPRAZOLE SODIUM 40 MG/1
TABLET, DELAYED RELEASE ORAL
Qty: 90 TABLET | Refills: 3 | OUTPATIENT
Start: 2021-04-30

## 2021-05-02 RX ORDER — PANTOPRAZOLE SODIUM 40 MG/1
40 TABLET, DELAYED RELEASE ORAL DAILY
Qty: 90 TABLET | Refills: 3 | Status: SHIPPED | OUTPATIENT
Start: 2021-05-02 | End: 2022-03-08 | Stop reason: SDUPTHER

## 2021-07-13 ENCOUNTER — OFFICE VISIT (OUTPATIENT)
Dept: INTERNAL MEDICINE | Facility: CLINIC | Age: 56
End: 2021-07-13
Payer: COMMERCIAL

## 2021-07-13 ENCOUNTER — PATIENT MESSAGE (OUTPATIENT)
Dept: INTERNAL MEDICINE | Facility: CLINIC | Age: 56
End: 2021-07-13

## 2021-07-13 ENCOUNTER — HOSPITAL ENCOUNTER (OUTPATIENT)
Dept: RADIOLOGY | Facility: HOSPITAL | Age: 56
Discharge: HOME | End: 2021-07-13
Attending: PHYSICIAN ASSISTANT
Payer: COMMERCIAL

## 2021-07-13 ENCOUNTER — PATIENT MESSAGE (OUTPATIENT)
Dept: ADMINISTRATIVE | Facility: HOSPITAL | Age: 56
End: 2021-07-13

## 2021-07-13 VITALS
BODY MASS INDEX: 30.53 KG/M2 | HEIGHT: 64 IN | HEART RATE: 87 BPM | WEIGHT: 178.81 LBS | OXYGEN SATURATION: 96 % | SYSTOLIC BLOOD PRESSURE: 132 MMHG | TEMPERATURE: 99 F | DIASTOLIC BLOOD PRESSURE: 72 MMHG

## 2021-07-13 DIAGNOSIS — R20.0 NUMBNESS AND TINGLING IN LEFT HAND: ICD-10-CM

## 2021-07-13 DIAGNOSIS — M25.512 ACUTE PAIN OF LEFT SHOULDER: Primary | ICD-10-CM

## 2021-07-13 DIAGNOSIS — R30.0 DYSURIA: ICD-10-CM

## 2021-07-13 DIAGNOSIS — R20.2 NUMBNESS AND TINGLING IN LEFT HAND: ICD-10-CM

## 2021-07-13 DIAGNOSIS — Z12.31 ENCOUNTER FOR SCREENING MAMMOGRAM FOR MALIGNANT NEOPLASM OF BREAST: ICD-10-CM

## 2021-07-13 DIAGNOSIS — M25.512 ACUTE PAIN OF LEFT SHOULDER: ICD-10-CM

## 2021-07-13 PROCEDURE — 1125F PR PAIN SEVERITY QUANTIFIED, PAIN PRESENT: ICD-10-PCS | Mod: S$GLB,,, | Performed by: PHYSICIAN ASSISTANT

## 2021-07-13 PROCEDURE — 73030 XR SHOULDER COMPLETE 2 OR MORE VIEWS LEFT: ICD-10-PCS | Mod: 26,LT,, | Performed by: RADIOLOGY

## 2021-07-13 PROCEDURE — 1125F AMNT PAIN NOTED PAIN PRSNT: CPT | Mod: S$GLB,,, | Performed by: PHYSICIAN ASSISTANT

## 2021-07-13 PROCEDURE — 99999 PR PBB SHADOW E&M-EST. PATIENT-LVL IV: ICD-10-PCS | Mod: PBBFAC,,, | Performed by: PHYSICIAN ASSISTANT

## 2021-07-13 PROCEDURE — 3008F PR BODY MASS INDEX (BMI) DOCUMENTED: ICD-10-PCS | Mod: CPTII,S$GLB,, | Performed by: PHYSICIAN ASSISTANT

## 2021-07-13 PROCEDURE — 99999 PR PBB SHADOW E&M-EST. PATIENT-LVL IV: CPT | Mod: PBBFAC,,, | Performed by: PHYSICIAN ASSISTANT

## 2021-07-13 PROCEDURE — 3008F BODY MASS INDEX DOCD: CPT | Mod: CPTII,S$GLB,, | Performed by: PHYSICIAN ASSISTANT

## 2021-07-13 PROCEDURE — 73030 X-RAY EXAM OF SHOULDER: CPT | Mod: 26,LT,, | Performed by: RADIOLOGY

## 2021-07-13 PROCEDURE — 73030 X-RAY EXAM OF SHOULDER: CPT | Mod: TC,LT

## 2021-07-13 PROCEDURE — 99214 PR OFFICE/OUTPT VISIT, EST, LEVL IV, 30-39 MIN: ICD-10-PCS | Mod: S$GLB,,, | Performed by: PHYSICIAN ASSISTANT

## 2021-07-13 PROCEDURE — 99214 OFFICE O/P EST MOD 30 MIN: CPT | Mod: S$GLB,,, | Performed by: PHYSICIAN ASSISTANT

## 2021-07-13 RX ORDER — DICLOFENAC SODIUM 10 MG/G
2 GEL TOPICAL 4 TIMES DAILY
Qty: 100 G | Refills: 0 | Status: SHIPPED | OUTPATIENT
Start: 2021-07-13 | End: 2021-09-15 | Stop reason: SDUPTHER

## 2021-07-14 ENCOUNTER — PATIENT OUTREACH (OUTPATIENT)
Dept: ADMINISTRATIVE | Facility: OTHER | Age: 56
End: 2021-07-14

## 2021-07-15 ENCOUNTER — OFFICE VISIT (OUTPATIENT)
Dept: ORTHOPEDICS | Facility: CLINIC | Age: 56
End: 2021-07-15
Payer: COMMERCIAL

## 2021-07-15 ENCOUNTER — PATIENT MESSAGE (OUTPATIENT)
Dept: ORTHOPEDICS | Facility: CLINIC | Age: 56
End: 2021-07-15

## 2021-07-15 VITALS
HEART RATE: 79 BPM | WEIGHT: 178.81 LBS | HEIGHT: 64 IN | SYSTOLIC BLOOD PRESSURE: 159 MMHG | BODY MASS INDEX: 30.53 KG/M2 | DIASTOLIC BLOOD PRESSURE: 86 MMHG

## 2021-07-15 DIAGNOSIS — M25.512 ACUTE PAIN OF LEFT SHOULDER: ICD-10-CM

## 2021-07-15 DIAGNOSIS — M75.42 IMPINGEMENT SYNDROME OF LEFT SHOULDER: ICD-10-CM

## 2021-07-15 DIAGNOSIS — M75.32 CALCIFIC TENDINITIS OF LEFT SHOULDER: Primary | ICD-10-CM

## 2021-07-15 PROCEDURE — 1125F PR PAIN SEVERITY QUANTIFIED, PAIN PRESENT: ICD-10-PCS | Mod: S$GLB,,, | Performed by: PHYSICIAN ASSISTANT

## 2021-07-15 PROCEDURE — 3008F PR BODY MASS INDEX (BMI) DOCUMENTED: ICD-10-PCS | Mod: CPTII,S$GLB,, | Performed by: PHYSICIAN ASSISTANT

## 2021-07-15 PROCEDURE — 1125F AMNT PAIN NOTED PAIN PRSNT: CPT | Mod: S$GLB,,, | Performed by: PHYSICIAN ASSISTANT

## 2021-07-15 PROCEDURE — 20610 LARGE JOINT ASPIRATION/INJECTION: L SUBACROMIAL BURSA: ICD-10-PCS | Mod: LT,S$GLB,, | Performed by: PHYSICIAN ASSISTANT

## 2021-07-15 PROCEDURE — 99204 OFFICE O/P NEW MOD 45 MIN: CPT | Mod: 25,S$GLB,, | Performed by: PHYSICIAN ASSISTANT

## 2021-07-15 PROCEDURE — 99999 PR PBB SHADOW E&M-EST. PATIENT-LVL IV: CPT | Mod: PBBFAC,,, | Performed by: PHYSICIAN ASSISTANT

## 2021-07-15 PROCEDURE — 3008F BODY MASS INDEX DOCD: CPT | Mod: CPTII,S$GLB,, | Performed by: PHYSICIAN ASSISTANT

## 2021-07-15 PROCEDURE — 99999 PR PBB SHADOW E&M-EST. PATIENT-LVL IV: ICD-10-PCS | Mod: PBBFAC,,, | Performed by: PHYSICIAN ASSISTANT

## 2021-07-15 PROCEDURE — 99204 PR OFFICE/OUTPT VISIT, NEW, LEVL IV, 45-59 MIN: ICD-10-PCS | Mod: 25,S$GLB,, | Performed by: PHYSICIAN ASSISTANT

## 2021-07-15 PROCEDURE — 20610 DRAIN/INJ JOINT/BURSA W/O US: CPT | Mod: LT,S$GLB,, | Performed by: PHYSICIAN ASSISTANT

## 2021-07-15 RX ORDER — METHYLPREDNISOLONE ACETATE 80 MG/ML
80 INJECTION, SUSPENSION INTRA-ARTICULAR; INTRALESIONAL; INTRAMUSCULAR; SOFT TISSUE
Status: DISCONTINUED | OUTPATIENT
Start: 2021-07-15 | End: 2021-07-15 | Stop reason: HOSPADM

## 2021-07-15 RX ORDER — CELECOXIB 200 MG/1
200 CAPSULE ORAL DAILY
Qty: 30 CAPSULE | Refills: 1 | Status: SHIPPED | OUTPATIENT
Start: 2021-07-15 | End: 2021-09-20

## 2021-07-15 RX ADMIN — METHYLPREDNISOLONE ACETATE 80 MG: 80 INJECTION, SUSPENSION INTRA-ARTICULAR; INTRALESIONAL; INTRAMUSCULAR; SOFT TISSUE at 10:07

## 2021-07-28 ENCOUNTER — PATIENT MESSAGE (OUTPATIENT)
Dept: OBSTETRICS AND GYNECOLOGY | Facility: CLINIC | Age: 56
End: 2021-07-28

## 2021-07-28 ENCOUNTER — OFFICE VISIT (OUTPATIENT)
Dept: OBSTETRICS AND GYNECOLOGY | Facility: CLINIC | Age: 56
End: 2021-07-28
Payer: COMMERCIAL

## 2021-07-28 VITALS
SYSTOLIC BLOOD PRESSURE: 136 MMHG | BODY MASS INDEX: 30.22 KG/M2 | HEIGHT: 64 IN | DIASTOLIC BLOOD PRESSURE: 84 MMHG | WEIGHT: 177 LBS

## 2021-07-28 DIAGNOSIS — Z01.419 ENCOUNTER FOR GYNECOLOGICAL EXAMINATION WITHOUT ABNORMAL FINDING: Primary | ICD-10-CM

## 2021-07-28 DIAGNOSIS — Z12.4 PAP SMEAR FOR CERVICAL CANCER SCREENING: ICD-10-CM

## 2021-07-28 PROCEDURE — 99999 PR PBB SHADOW E&M-EST. PATIENT-LVL III: ICD-10-PCS | Mod: PBBFAC,,, | Performed by: OBSTETRICS & GYNECOLOGY

## 2021-07-28 PROCEDURE — 1159F PR MEDICATION LIST DOCUMENTED IN MEDICAL RECORD: ICD-10-PCS | Mod: CPTII,S$GLB,, | Performed by: OBSTETRICS & GYNECOLOGY

## 2021-07-28 PROCEDURE — 3008F BODY MASS INDEX DOCD: CPT | Mod: CPTII,S$GLB,, | Performed by: OBSTETRICS & GYNECOLOGY

## 2021-07-28 PROCEDURE — 87624 HPV HI-RISK TYP POOLED RSLT: CPT | Performed by: OBSTETRICS & GYNECOLOGY

## 2021-07-28 PROCEDURE — 1159F MED LIST DOCD IN RCRD: CPT | Mod: CPTII,S$GLB,, | Performed by: OBSTETRICS & GYNECOLOGY

## 2021-07-28 PROCEDURE — 3008F PR BODY MASS INDEX (BMI) DOCUMENTED: ICD-10-PCS | Mod: CPTII,S$GLB,, | Performed by: OBSTETRICS & GYNECOLOGY

## 2021-07-28 PROCEDURE — 3075F PR MOST RECENT SYSTOLIC BLOOD PRESS GE 130-139MM HG: ICD-10-PCS | Mod: CPTII,S$GLB,, | Performed by: OBSTETRICS & GYNECOLOGY

## 2021-07-28 PROCEDURE — 3075F SYST BP GE 130 - 139MM HG: CPT | Mod: CPTII,S$GLB,, | Performed by: OBSTETRICS & GYNECOLOGY

## 2021-07-28 PROCEDURE — 99396 PREV VISIT EST AGE 40-64: CPT | Mod: S$GLB,,, | Performed by: OBSTETRICS & GYNECOLOGY

## 2021-07-28 PROCEDURE — 3079F PR MOST RECENT DIASTOLIC BLOOD PRESSURE 80-89 MM HG: ICD-10-PCS | Mod: CPTII,S$GLB,, | Performed by: OBSTETRICS & GYNECOLOGY

## 2021-07-28 PROCEDURE — 3079F DIAST BP 80-89 MM HG: CPT | Mod: CPTII,S$GLB,, | Performed by: OBSTETRICS & GYNECOLOGY

## 2021-07-28 PROCEDURE — 99999 PR PBB SHADOW E&M-EST. PATIENT-LVL III: CPT | Mod: PBBFAC,,, | Performed by: OBSTETRICS & GYNECOLOGY

## 2021-07-28 PROCEDURE — 1126F PR PAIN SEVERITY QUANTIFIED, NO PAIN PRESENT: ICD-10-PCS | Mod: CPTII,S$GLB,, | Performed by: OBSTETRICS & GYNECOLOGY

## 2021-07-28 PROCEDURE — 88175 CYTOPATH C/V AUTO FLUID REDO: CPT | Performed by: OBSTETRICS & GYNECOLOGY

## 2021-07-28 PROCEDURE — 99396 PR PREVENTIVE VISIT,EST,40-64: ICD-10-PCS | Mod: S$GLB,,, | Performed by: OBSTETRICS & GYNECOLOGY

## 2021-07-28 PROCEDURE — 1126F AMNT PAIN NOTED NONE PRSNT: CPT | Mod: CPTII,S$GLB,, | Performed by: OBSTETRICS & GYNECOLOGY

## 2021-08-02 ENCOUNTER — PATIENT MESSAGE (OUTPATIENT)
Dept: ORTHOPEDICS | Facility: CLINIC | Age: 56
End: 2021-08-02

## 2021-08-02 LAB
HPV HR 12 DNA SPEC QL NAA+PROBE: NEGATIVE
HPV16 AG SPEC QL: NEGATIVE
HPV18 DNA SPEC QL NAA+PROBE: NEGATIVE

## 2021-08-06 LAB
FINAL PATHOLOGIC DIAGNOSIS: NORMAL
Lab: NORMAL

## 2021-09-15 ENCOUNTER — PATIENT MESSAGE (OUTPATIENT)
Dept: ENDOSCOPY | Facility: HOSPITAL | Age: 56
End: 2021-09-15

## 2021-09-15 ENCOUNTER — TELEPHONE (OUTPATIENT)
Dept: ENDOSCOPY | Facility: HOSPITAL | Age: 56
End: 2021-09-15

## 2021-09-15 DIAGNOSIS — Z01.818 PRE-OP TESTING: ICD-10-CM

## 2021-09-15 RX ORDER — SODIUM, POTASSIUM,MAG SULFATES 17.5-3.13G
1 SOLUTION, RECONSTITUTED, ORAL ORAL DAILY
Qty: 1 KIT | Refills: 0 | Status: CANCELLED | OUTPATIENT
Start: 2021-09-15 | End: 2021-09-17

## 2021-09-20 ENCOUNTER — OFFICE VISIT (OUTPATIENT)
Dept: INTERNAL MEDICINE | Facility: CLINIC | Age: 56
End: 2021-09-20
Payer: COMMERCIAL

## 2021-09-20 ENCOUNTER — PATIENT MESSAGE (OUTPATIENT)
Dept: INTERNAL MEDICINE | Facility: CLINIC | Age: 56
End: 2021-09-20

## 2021-09-20 VITALS
HEART RATE: 69 BPM | HEIGHT: 64 IN | TEMPERATURE: 99 F | DIASTOLIC BLOOD PRESSURE: 80 MMHG | BODY MASS INDEX: 30.26 KG/M2 | OXYGEN SATURATION: 96 % | SYSTOLIC BLOOD PRESSURE: 134 MMHG | WEIGHT: 177.25 LBS

## 2021-09-20 DIAGNOSIS — I10 ESSENTIAL HYPERTENSION: ICD-10-CM

## 2021-09-20 DIAGNOSIS — T14.8XXA MUSCLE STRAIN: Primary | ICD-10-CM

## 2021-09-20 DIAGNOSIS — T14.8XXA MUSCLE STRAIN: ICD-10-CM

## 2021-09-20 PROCEDURE — 99214 OFFICE O/P EST MOD 30 MIN: CPT | Mod: 25,S$GLB,, | Performed by: PHYSICIAN ASSISTANT

## 2021-09-20 PROCEDURE — 3079F DIAST BP 80-89 MM HG: CPT | Mod: CPTII,S$GLB,, | Performed by: PHYSICIAN ASSISTANT

## 2021-09-20 PROCEDURE — 1160F RVW MEDS BY RX/DR IN RCRD: CPT | Mod: CPTII,S$GLB,, | Performed by: PHYSICIAN ASSISTANT

## 2021-09-20 PROCEDURE — 1159F MED LIST DOCD IN RCRD: CPT | Mod: CPTII,S$GLB,, | Performed by: PHYSICIAN ASSISTANT

## 2021-09-20 PROCEDURE — 3075F PR MOST RECENT SYSTOLIC BLOOD PRESS GE 130-139MM HG: ICD-10-PCS | Mod: CPTII,S$GLB,, | Performed by: PHYSICIAN ASSISTANT

## 2021-09-20 PROCEDURE — 3008F PR BODY MASS INDEX (BMI) DOCUMENTED: ICD-10-PCS | Mod: CPTII,S$GLB,, | Performed by: PHYSICIAN ASSISTANT

## 2021-09-20 PROCEDURE — 3075F SYST BP GE 130 - 139MM HG: CPT | Mod: CPTII,S$GLB,, | Performed by: PHYSICIAN ASSISTANT

## 2021-09-20 PROCEDURE — 1160F PR REVIEW ALL MEDS BY PRESCRIBER/CLIN PHARMACIST DOCUMENTED: ICD-10-PCS | Mod: CPTII,S$GLB,, | Performed by: PHYSICIAN ASSISTANT

## 2021-09-20 PROCEDURE — 99214 PR OFFICE/OUTPT VISIT, EST, LEVL IV, 30-39 MIN: ICD-10-PCS | Mod: 25,S$GLB,, | Performed by: PHYSICIAN ASSISTANT

## 2021-09-20 PROCEDURE — 3008F BODY MASS INDEX DOCD: CPT | Mod: CPTII,S$GLB,, | Performed by: PHYSICIAN ASSISTANT

## 2021-09-20 PROCEDURE — 3079F PR MOST RECENT DIASTOLIC BLOOD PRESSURE 80-89 MM HG: ICD-10-PCS | Mod: CPTII,S$GLB,, | Performed by: PHYSICIAN ASSISTANT

## 2021-09-20 PROCEDURE — 1159F PR MEDICATION LIST DOCUMENTED IN MEDICAL RECORD: ICD-10-PCS | Mod: CPTII,S$GLB,, | Performed by: PHYSICIAN ASSISTANT

## 2021-09-20 PROCEDURE — 96372 PR INJECTION,THERAP/PROPH/DIAG2ST, IM OR SUBCUT: ICD-10-PCS | Mod: S$GLB,,, | Performed by: PHYSICIAN ASSISTANT

## 2021-09-20 PROCEDURE — 96372 THER/PROPH/DIAG INJ SC/IM: CPT | Mod: S$GLB,,, | Performed by: PHYSICIAN ASSISTANT

## 2021-09-20 PROCEDURE — 99999 PR PBB SHADOW E&M-EST. PATIENT-LVL III: ICD-10-PCS | Mod: PBBFAC,,, | Performed by: PHYSICIAN ASSISTANT

## 2021-09-20 PROCEDURE — 99999 PR PBB SHADOW E&M-EST. PATIENT-LVL III: CPT | Mod: PBBFAC,,, | Performed by: PHYSICIAN ASSISTANT

## 2021-09-20 RX ORDER — CYCLOBENZAPRINE HCL 10 MG
10 TABLET ORAL 3 TIMES DAILY PRN
Qty: 20 TABLET | Refills: 0 | Status: ON HOLD | OUTPATIENT
Start: 2021-09-20 | End: 2021-11-01 | Stop reason: HOSPADM

## 2021-09-20 RX ORDER — KETOROLAC TROMETHAMINE 30 MG/ML
30 INJECTION, SOLUTION INTRAMUSCULAR; INTRAVENOUS
Status: COMPLETED | OUTPATIENT
Start: 2021-09-20 | End: 2021-09-20

## 2021-09-20 RX ORDER — CYCLOBENZAPRINE HCL 10 MG
10 TABLET ORAL 3 TIMES DAILY PRN
Qty: 20 TABLET | Refills: 0 | Status: SHIPPED | OUTPATIENT
Start: 2021-09-20 | End: 2021-09-20 | Stop reason: SDUPTHER

## 2021-09-20 RX ADMIN — KETOROLAC TROMETHAMINE 30 MG: 30 INJECTION, SOLUTION INTRAMUSCULAR; INTRAVENOUS at 01:09

## 2021-09-24 ENCOUNTER — PATIENT MESSAGE (OUTPATIENT)
Dept: ENDOSCOPY | Facility: HOSPITAL | Age: 56
End: 2021-09-24

## 2021-09-24 ENCOUNTER — HOSPITAL ENCOUNTER (OUTPATIENT)
Dept: RADIOLOGY | Facility: HOSPITAL | Age: 56
Discharge: HOME | End: 2021-09-24
Attending: PHYSICIAN ASSISTANT
Payer: COMMERCIAL

## 2021-09-24 DIAGNOSIS — Z12.31 ENCOUNTER FOR SCREENING MAMMOGRAM FOR MALIGNANT NEOPLASM OF BREAST: ICD-10-CM

## 2021-09-24 PROCEDURE — 77067 SCR MAMMO BI INCL CAD: CPT | Mod: TC

## 2021-09-24 PROCEDURE — 77067 MAMMO DIGITAL SCREENING BILAT WITH TOMO: ICD-10-PCS | Mod: 26,,, | Performed by: RADIOLOGY

## 2021-09-24 PROCEDURE — 77067 SCR MAMMO BI INCL CAD: CPT | Mod: 26,,, | Performed by: RADIOLOGY

## 2021-09-24 PROCEDURE — 77063 BREAST TOMOSYNTHESIS BI: CPT | Mod: 26,,, | Performed by: RADIOLOGY

## 2021-09-24 PROCEDURE — 77063 MAMMO DIGITAL SCREENING BILAT WITH TOMO: ICD-10-PCS | Mod: 26,,, | Performed by: RADIOLOGY

## 2021-09-28 ENCOUNTER — PATIENT MESSAGE (OUTPATIENT)
Dept: INTERNAL MEDICINE | Facility: CLINIC | Age: 56
End: 2021-09-28

## 2021-09-29 RX ORDER — SODIUM, POTASSIUM,MAG SULFATES 17.5-3.13G
1 SOLUTION, RECONSTITUTED, ORAL ORAL DAILY
Qty: 1 KIT | Refills: 0 | Status: SHIPPED | OUTPATIENT
Start: 2021-09-29 | End: 2021-10-01

## 2021-10-27 ENCOUNTER — PATIENT MESSAGE (OUTPATIENT)
Dept: INTERNAL MEDICINE | Facility: CLINIC | Age: 56
End: 2021-10-27
Payer: COMMERCIAL

## 2021-11-01 ENCOUNTER — HOSPITAL ENCOUNTER (OUTPATIENT)
Facility: HOSPITAL | Age: 56
Discharge: HOME | End: 2021-11-01
Attending: INTERNAL MEDICINE | Admitting: INTERNAL MEDICINE
Payer: COMMERCIAL

## 2021-11-01 ENCOUNTER — ANESTHESIA (OUTPATIENT)
Dept: ENDOSCOPY | Facility: HOSPITAL | Age: 56
End: 2021-11-01
Payer: COMMERCIAL

## 2021-11-01 ENCOUNTER — ANESTHESIA EVENT (OUTPATIENT)
Dept: ENDOSCOPY | Facility: HOSPITAL | Age: 56
End: 2021-11-01
Payer: COMMERCIAL

## 2021-11-01 DIAGNOSIS — Z80.0 FAMILY HISTORY OF COLON CANCER IN FATHER: Primary | ICD-10-CM

## 2021-11-01 PROCEDURE — G0105 COLORECTAL SCRN; HI RISK IND: ICD-10-PCS | Mod: ,,, | Performed by: INTERNAL MEDICINE

## 2021-11-01 PROCEDURE — 37000009 HC ANESTHESIA EA ADD 15 MINS: Performed by: INTERNAL MEDICINE

## 2021-11-01 PROCEDURE — 37000008 HC ANESTHESIA 1ST 15 MINUTES: Performed by: INTERNAL MEDICINE

## 2021-11-01 PROCEDURE — G0105 COLORECTAL SCRN; HI RISK IND: HCPCS | Mod: ,,, | Performed by: INTERNAL MEDICINE

## 2021-11-01 PROCEDURE — 63600175 PHARM REV CODE 636 W HCPCS: Performed by: NURSE ANESTHETIST, CERTIFIED REGISTERED

## 2021-11-01 PROCEDURE — 25000003 PHARM REV CODE 250: Performed by: NURSE ANESTHETIST, CERTIFIED REGISTERED

## 2021-11-01 PROCEDURE — G0105 COLORECTAL SCRN; HI RISK IND: HCPCS | Performed by: INTERNAL MEDICINE

## 2021-11-01 RX ORDER — PROPOFOL 10 MG/ML
VIAL (ML) INTRAVENOUS
Status: DISCONTINUED | OUTPATIENT
Start: 2021-11-01 | End: 2021-11-01

## 2021-11-01 RX ORDER — LIDOCAINE HYDROCHLORIDE 10 MG/ML
INJECTION, SOLUTION EPIDURAL; INFILTRATION; INTRACAUDAL; PERINEURAL
Status: DISCONTINUED | OUTPATIENT
Start: 2021-11-01 | End: 2021-11-01

## 2021-11-01 RX ADMIN — PROPOFOL 50 MG: 10 INJECTION, EMULSION INTRAVENOUS at 09:11

## 2021-11-01 RX ADMIN — SODIUM CHLORIDE, SODIUM LACTATE, POTASSIUM CHLORIDE, AND CALCIUM CHLORIDE: .6; .31; .03; .02 INJECTION, SOLUTION INTRAVENOUS at 09:11

## 2021-11-01 RX ADMIN — LIDOCAINE HYDROCHLORIDE 50 MG: 10 INJECTION, SOLUTION EPIDURAL; INFILTRATION; INTRACAUDAL; PERINEURAL at 09:11

## 2021-11-01 RX ADMIN — PROPOFOL 100 MG: 10 INJECTION, EMULSION INTRAVENOUS at 09:11

## 2021-11-02 VITALS
DIASTOLIC BLOOD PRESSURE: 62 MMHG | SYSTOLIC BLOOD PRESSURE: 117 MMHG | HEIGHT: 64 IN | HEART RATE: 62 BPM | BODY MASS INDEX: 29.02 KG/M2 | WEIGHT: 170 LBS | OXYGEN SATURATION: 99 % | TEMPERATURE: 98 F | RESPIRATION RATE: 18 BRPM

## 2021-11-14 ENCOUNTER — PATIENT MESSAGE (OUTPATIENT)
Dept: INTERNAL MEDICINE | Facility: CLINIC | Age: 56
End: 2021-11-14
Payer: COMMERCIAL

## 2022-01-31 NOTE — PROGRESS NOTES
Chief Complaint:   Low testosterone    HPI:   Patient is a 32-year-old male that is presenting with his wife.  Reports that he was seen by primary care physician for fatigue, low libido and weight gain.  Patient states that has increased his exercise regimes and is watching his diet, however, is not losing weight.  Denies erectile dysfunction.  Total testosterone is 285. Patient has 2 children but wants more children with new wife.      Allergies:  Patient has no known allergies.    Medications:  currently has no medications in their medication list.    Review of Systems:  General: No fever, chills, fatigability, or weight loss.  Skin: No rashes, itching, or changes in color or texture of skin.  Chest: Denies SEPULVEDA, cyanosis, wheezing, cough, and sputum production.  Abdomen: Appetite fine. No weight loss. Denies diarrhea, abdominal pain, hematemesis, or blood in stool.  Musculoskeletal: No joint stiffness or swelling. Denies back pain.  : As above.  All other review of systems negative.    PMH:  Obesity, smoking    PSH:   has a past surgical history that includes Knee surgery (Bilateral).    FamHx: family history includes Diabetes in his mother; Multiple sclerosis in his mother; Stroke in his father.    SocHx:  reports that he has been smoking vaping with nicotine. He has never used smokeless tobacco. He reports previous alcohol use. He reports previous drug use.      Physical Exam:  Vitals:    01/31/22 1354   BP: 132/89   Pulse: 109   Temp: 98.4 °F (36.9 °C)     General:  Morbidly obese male, A&Ox3, no apparent distress, no deformities  Neck: No masses, normal thyroid  Lungs: normal inspiration, no use of accessory muscles  Heart: normal pulse, no arrhythmias  Abdomen: Soft, NT, ND, no masses, no hernias, no hepatosplenomegaly  Lymphatic: Neck and groin nodes negative    Labs/Studies:   Results for PEDRO DE LEON (MRN [id number]) as of 1/31/2022 15:40   Ref. Range 1/6/2022 08:51   Testosterone Latest Ref Range: 250 -  Lia Ragsdale  51 y.o. White female    Here for follow up on hypertension.  She was started on amlodipine 5 mg in Februrary 2017.   Reports taking medications as instructed.  Not taking any OTC meds.    Past Medical History:   Diagnosis Date    Bladder instability 1/13/2015    Depression     History of depression in the past. Pt states it was from a stressful job.    Gallstones     General anesthetics causing adverse effect in therapeutic use     had respiratory distress after propofol    HPV (human papilloma virus) anogenital infection     Peptic ulcer disease          Current Outpatient Prescriptions:     amlodipine (NORVASC) 5 MG tablet, Take 1 tablet (5 mg total) by mouth once daily., Disp: 30 tablet, Rfl: 1    cetirizine (ZYRTEC) 10 MG tablet, , Disp: , Rfl:     pantoprazole (PROTONIX) 40 MG tablet, Take 1 tablet (40 mg total) by mouth once daily., Disp: 30 tablet, Rfl: 3    Review of patient's allergies indicates:  No Known Allergies    ROS: Denies dizziness, headache, chest pain, shortness of breath or edema    PE:  GEN: Alert and oriented, no acute distress  HEART: Normal S1 and S2, RRR, no lower extremity edema  LUNGS: Respirations unlabored, bilaterally clear to auscultation    ASSESSMENT/PLAN:  1. Hypertension-Doing well today. Stable  Continue exercise and diet adjustments. Will follow up in 1 year or sooner if needed.        1100 ng/dL 248 (L)     Impression/Plan:   Hypogonadism  Patient and wife aware that testosterone replacement therapy can affect his fertility.  They want to proceed with conceiving and we will revisit this issue in 6 months.  Patient to return to clinic with testosterone labs in 6 months.  I did spend half an hour with patient to discuss the need to continue healthy lifestyle changes related to his obesity.

## 2022-03-08 ENCOUNTER — OFFICE VISIT (OUTPATIENT)
Dept: INTERNAL MEDICINE | Facility: CLINIC | Age: 57
End: 2022-03-08
Payer: COMMERCIAL

## 2022-03-08 VITALS
WEIGHT: 177.69 LBS | HEART RATE: 86 BPM | SYSTOLIC BLOOD PRESSURE: 150 MMHG | HEIGHT: 64 IN | TEMPERATURE: 97 F | OXYGEN SATURATION: 96 % | BODY MASS INDEX: 30.34 KG/M2 | DIASTOLIC BLOOD PRESSURE: 82 MMHG

## 2022-03-08 DIAGNOSIS — I10 ESSENTIAL HYPERTENSION: Primary | ICD-10-CM

## 2022-03-08 DIAGNOSIS — K21.9 GASTROESOPHAGEAL REFLUX DISEASE WITHOUT ESOPHAGITIS: ICD-10-CM

## 2022-03-08 DIAGNOSIS — Z86.19 H/O COLD SORES: ICD-10-CM

## 2022-03-08 DIAGNOSIS — Z00.00 PREVENTATIVE HEALTH CARE: ICD-10-CM

## 2022-03-08 DIAGNOSIS — L30.9 DERMATITIS: ICD-10-CM

## 2022-03-08 PROCEDURE — 3077F PR MOST RECENT SYSTOLIC BLOOD PRESSURE >= 140 MM HG: ICD-10-PCS | Mod: CPTII,S$GLB,, | Performed by: PHYSICIAN ASSISTANT

## 2022-03-08 PROCEDURE — 1159F MED LIST DOCD IN RCRD: CPT | Mod: CPTII,S$GLB,, | Performed by: PHYSICIAN ASSISTANT

## 2022-03-08 PROCEDURE — 1159F PR MEDICATION LIST DOCUMENTED IN MEDICAL RECORD: ICD-10-PCS | Mod: CPTII,S$GLB,, | Performed by: PHYSICIAN ASSISTANT

## 2022-03-08 PROCEDURE — 1160F PR REVIEW ALL MEDS BY PRESCRIBER/CLIN PHARMACIST DOCUMENTED: ICD-10-PCS | Mod: CPTII,S$GLB,, | Performed by: PHYSICIAN ASSISTANT

## 2022-03-08 PROCEDURE — 3077F SYST BP >= 140 MM HG: CPT | Mod: CPTII,S$GLB,, | Performed by: PHYSICIAN ASSISTANT

## 2022-03-08 PROCEDURE — 1160F RVW MEDS BY RX/DR IN RCRD: CPT | Mod: CPTII,S$GLB,, | Performed by: PHYSICIAN ASSISTANT

## 2022-03-08 PROCEDURE — 99999 PR PBB SHADOW E&M-EST. PATIENT-LVL III: CPT | Mod: PBBFAC,,, | Performed by: PHYSICIAN ASSISTANT

## 2022-03-08 PROCEDURE — 3079F PR MOST RECENT DIASTOLIC BLOOD PRESSURE 80-89 MM HG: ICD-10-PCS | Mod: CPTII,S$GLB,, | Performed by: PHYSICIAN ASSISTANT

## 2022-03-08 PROCEDURE — 99214 OFFICE O/P EST MOD 30 MIN: CPT | Mod: S$GLB,,, | Performed by: PHYSICIAN ASSISTANT

## 2022-03-08 PROCEDURE — 3008F PR BODY MASS INDEX (BMI) DOCUMENTED: ICD-10-PCS | Mod: CPTII,S$GLB,, | Performed by: PHYSICIAN ASSISTANT

## 2022-03-08 PROCEDURE — 99214 PR OFFICE/OUTPT VISIT, EST, LEVL IV, 30-39 MIN: ICD-10-PCS | Mod: S$GLB,,, | Performed by: PHYSICIAN ASSISTANT

## 2022-03-08 PROCEDURE — 99999 PR PBB SHADOW E&M-EST. PATIENT-LVL III: ICD-10-PCS | Mod: PBBFAC,,, | Performed by: PHYSICIAN ASSISTANT

## 2022-03-08 PROCEDURE — 3079F DIAST BP 80-89 MM HG: CPT | Mod: CPTII,S$GLB,, | Performed by: PHYSICIAN ASSISTANT

## 2022-03-08 PROCEDURE — 3008F BODY MASS INDEX DOCD: CPT | Mod: CPTII,S$GLB,, | Performed by: PHYSICIAN ASSISTANT

## 2022-03-08 RX ORDER — VALACYCLOVIR HYDROCHLORIDE 1 G/1
TABLET, FILM COATED ORAL
Qty: 4 TABLET | Refills: 1 | Status: SHIPPED | OUTPATIENT
Start: 2022-03-08 | End: 2022-10-03 | Stop reason: SDUPTHER

## 2022-03-08 RX ORDER — TRIAMCINOLONE ACETONIDE 1 MG/G
OINTMENT TOPICAL 2 TIMES DAILY
Qty: 15 G | Refills: 0 | Status: SHIPPED | OUTPATIENT
Start: 2022-03-08 | End: 2022-10-04

## 2022-03-08 RX ORDER — PANTOPRAZOLE SODIUM 40 MG/1
40 TABLET, DELAYED RELEASE ORAL DAILY
Qty: 90 TABLET | Refills: 1 | Status: SHIPPED | OUTPATIENT
Start: 2022-03-08 | End: 2022-04-18 | Stop reason: SDUPTHER

## 2022-03-08 RX ORDER — AMLODIPINE BESYLATE 2.5 MG/1
2.5 TABLET ORAL DAILY
Qty: 30 TABLET | Refills: 3 | Status: SHIPPED | OUTPATIENT
Start: 2022-03-08 | End: 2022-04-18 | Stop reason: SDUPTHER

## 2022-03-08 NOTE — PROGRESS NOTES
"Subjective:      Patient ID: Lia Ragsdale is a 56 y.o. female.    Chief Complaint: No chief complaint on file.    Patient is known to me, being seen today for medication refill.    Reports dryness/eczema to elbows, using lotrisone prn which seems to help     Also requesting fever blister medication for the summer    BP has been elevated since having COVID in January   BP at home has been elevated as well  Occasionally with HAs    Last visit Sept 2021.    Review of Systems   Constitutional: Positive for activity change. Negative for unexpected weight change.   HENT: Positive for sore throat (scratchy) and trouble swallowing (since COVID, scratchy throat, occasionally chokes on water). Negative for congestion, hearing loss, postnasal drip and rhinorrhea.         Takes Zyrtec for allergy symptoms   Eyes: Negative for visual disturbance.   Respiratory: Positive for cough (dry). Negative for chest tightness, shortness of breath and wheezing.    Cardiovascular: Negative for chest pain, palpitations and leg swelling.   Gastrointestinal: Negative for blood in stool, constipation, diarrhea and vomiting.   Endocrine: Negative for polydipsia and polyuria.   Genitourinary: Negative for difficulty urinating, dysuria, hematuria and menstrual problem.   Musculoskeletal: Negative for arthralgias, joint swelling and neck pain.   Neurological: Positive for weakness, light-headedness and headaches.   Psychiatric/Behavioral: Negative for confusion and dysphoric mood.       Objective:   BP (!) 150/82   Pulse 86   Temp 96.9 °F (36.1 °C)   Ht 5' 4" (1.626 m)   Wt 80.6 kg (177 lb 11.1 oz)   LMP 07/18/2016   SpO2 96%   BMI 30.50 kg/m²   Physical Exam  Constitutional:       General: She is not in acute distress.     Appearance: Normal appearance. She is well-developed. She is not ill-appearing.   HENT:      Head: Normocephalic and atraumatic.   Cardiovascular:      Rate and Rhythm: Normal rate and regular rhythm.      Heart " sounds: Normal heart sounds. No murmur heard.  Pulmonary:      Effort: Pulmonary effort is normal. No respiratory distress.      Breath sounds: Normal breath sounds. No decreased breath sounds.   Musculoskeletal:      Right lower leg: No edema.      Left lower leg: No edema.   Skin:     General: Skin is warm and dry.      Findings: No rash.   Psychiatric:         Speech: Speech normal.         Behavior: Behavior normal.         Thought Content: Thought content normal.       Assessment:      1. Essential hypertension    2. Preventative health care    3. Gastroesophageal reflux disease without esophagitis    4. H/O cold sores    5. Dermatitis       Plan:   Essential hypertension  -     CBC Auto Differential; Future; Expected date: 03/08/2022  -     Comprehensive Metabolic Panel; Future; Expected date: 03/08/2022  -     Lipid Panel; Future; Expected date: 03/08/2022  -     amLODIPine (NORVASC) 2.5 MG tablet; Take 1 tablet (2.5 mg total) by mouth once daily.  Dispense: 30 tablet; Refill: 3    Preventative health care  -     TSH; Future; Expected date: 03/08/2022    Gastroesophageal reflux disease without esophagitis  -     pantoprazole (PROTONIX) 40 MG tablet; Take 1 tablet (40 mg total) by mouth once daily.  Dispense: 90 tablet; Refill: 1    H/O cold sores  -     valACYclovir (VALTREX) 1000 MG tablet; Two pills twice daily for one day  Dispense: 4 tablet; Refill: 1    Dermatitis  -     triamcinolone acetonide 0.1% (KENALOG) 0.1 % ointment; Apply topically 2 (two) times daily.  Dispense: 15 g; Refill: 0      Consider ENT referral if trouble swallowing persists     Start amlodipine  Monitor BP and keep log, bring cuff and log to f/u in 1mth     Review labs next visit, has upcoming wellness screen at work and will get labs at that time     Discussed worsening signs/symptoms and when to return to clinic or go to ED.   Patient expresses understanding and agrees with treatment plan.

## 2022-03-15 ENCOUNTER — TELEPHONE (OUTPATIENT)
Dept: INTERNAL MEDICINE | Facility: CLINIC | Age: 57
End: 2022-03-15
Payer: COMMERCIAL

## 2022-03-15 ENCOUNTER — PATIENT OUTREACH (OUTPATIENT)
Dept: ADMINISTRATIVE | Facility: HOSPITAL | Age: 57
End: 2022-03-15
Payer: COMMERCIAL

## 2022-03-15 LAB
ALBUMIN SERPL BCP-MCNC: 4.4 G/DL (ref 3.5–5)
ALBUMIN/GLOB SERPL: 1.6 {RATIO}
ALP SERPL-CCNC: 65 U/L (ref 25–125)
ALT SERPL W P-5'-P-CCNC: 14 U/L (ref 7–35)
AST SERPL-CCNC: 16 U/L (ref 13–35)
BILIRUB SERPL-MCNC: 0.3 MG/DL (ref 0.1–1.4)
BUN SERPL-MCNC: 21 MG/DL
BUN/CREAT SERPL: 33
CALCIUM SERPL-MCNC: 9.8 MG/DL (ref 8.7–10.7)
CHLORIDE SERPL-SCNC: 105 MMOL/L (ref 99–108)
CHOL/HDLC RATIO: 5
CHOLEST SERPL-MSCNC: 236 MG/DL (ref 0–200)
CREAT SERPL-MCNC: 0.6 MG/DL (ref 0.5–1.1)
EST. GFR  (NO RACE VARIABLE): 105
GLOBULIN SER CALC-MCNC: 2.7 G/L
GLUCOSE SERPL-MCNC: 94 MG/DL
HDLC SERPL-MCNC: 47 MG/DL
LDLC SERPL CALC-MCNC: 166 MG/DL (ref 0–160)
PHOSPHATE FLD-MCNC: 3.3 MG/DL (ref 2.5–4.9)
POTASSIUM SERPL-SCNC: 4.4 MMOL/L (ref 3.4–5.3)
PROT SERPL-MCNC: 7.1 G/DL
SODIUM BLD-SCNC: 143 MMOL/L (ref 137–147)
T3 UPTAKE: 25
T4 FREE SERPL-MCNC: 2.1 NG/DL
T4,THYROXINE: 8.1 UG/DL
TRIGL SERPL-MCNC: 128 MG/DL
TSH SERPL DL<=0.005 MIU/L-ACNC: 1.89 UIU/ML (ref 0.41–5.9)
URATE SERPL-MCNC: 5.3 MG/DL
VLDLC SERPL-MCNC: 23 MG/DL

## 2022-03-15 NOTE — PROGRESS NOTES
HTN Report: Patient was seen in primary care on 3/8/22 and BP was elevated, 150/82. Patient notified to obtain a home BP reading. Patient states her BP this morning was 132/88. Advised to continue to monitor and log readings, call the office as needed. Remote BP will be entered.

## 2022-03-18 ENCOUNTER — PATIENT MESSAGE (OUTPATIENT)
Dept: INTERNAL MEDICINE | Facility: CLINIC | Age: 57
End: 2022-03-18
Payer: COMMERCIAL

## 2022-04-18 ENCOUNTER — OFFICE VISIT (OUTPATIENT)
Dept: INTERNAL MEDICINE | Facility: CLINIC | Age: 57
End: 2022-04-18
Payer: COMMERCIAL

## 2022-04-18 VITALS
WEIGHT: 178.13 LBS | OXYGEN SATURATION: 97 % | TEMPERATURE: 98 F | BODY MASS INDEX: 30.41 KG/M2 | HEIGHT: 64 IN | SYSTOLIC BLOOD PRESSURE: 124 MMHG | RESPIRATION RATE: 18 BRPM | DIASTOLIC BLOOD PRESSURE: 76 MMHG | HEART RATE: 74 BPM

## 2022-04-18 DIAGNOSIS — K21.9 GASTROESOPHAGEAL REFLUX DISEASE WITHOUT ESOPHAGITIS: ICD-10-CM

## 2022-04-18 DIAGNOSIS — Z00.00 ROUTINE PHYSICAL EXAMINATION: Primary | ICD-10-CM

## 2022-04-18 DIAGNOSIS — I10 ESSENTIAL HYPERTENSION: ICD-10-CM

## 2022-04-18 DIAGNOSIS — Z11.59 ENCOUNTER FOR HEPATITIS C SCREENING TEST FOR LOW RISK PATIENT: ICD-10-CM

## 2022-04-18 PROCEDURE — 1159F PR MEDICATION LIST DOCUMENTED IN MEDICAL RECORD: ICD-10-PCS | Mod: CPTII,S$GLB,, | Performed by: FAMILY MEDICINE

## 2022-04-18 PROCEDURE — 99396 PREV VISIT EST AGE 40-64: CPT | Mod: S$GLB,,, | Performed by: FAMILY MEDICINE

## 2022-04-18 PROCEDURE — 99396 PR PREVENTIVE VISIT,EST,40-64: ICD-10-PCS | Mod: S$GLB,,, | Performed by: FAMILY MEDICINE

## 2022-04-18 PROCEDURE — 99999 PR PBB SHADOW E&M-EST. PATIENT-LVL III: CPT | Mod: PBBFAC,,, | Performed by: FAMILY MEDICINE

## 2022-04-18 PROCEDURE — 3074F SYST BP LT 130 MM HG: CPT | Mod: CPTII,S$GLB,, | Performed by: FAMILY MEDICINE

## 2022-04-18 PROCEDURE — 3008F BODY MASS INDEX DOCD: CPT | Mod: CPTII,S$GLB,, | Performed by: FAMILY MEDICINE

## 2022-04-18 PROCEDURE — 3008F PR BODY MASS INDEX (BMI) DOCUMENTED: ICD-10-PCS | Mod: CPTII,S$GLB,, | Performed by: FAMILY MEDICINE

## 2022-04-18 PROCEDURE — 3078F DIAST BP <80 MM HG: CPT | Mod: CPTII,S$GLB,, | Performed by: FAMILY MEDICINE

## 2022-04-18 PROCEDURE — 3078F PR MOST RECENT DIASTOLIC BLOOD PRESSURE < 80 MM HG: ICD-10-PCS | Mod: CPTII,S$GLB,, | Performed by: FAMILY MEDICINE

## 2022-04-18 PROCEDURE — 3074F PR MOST RECENT SYSTOLIC BLOOD PRESSURE < 130 MM HG: ICD-10-PCS | Mod: CPTII,S$GLB,, | Performed by: FAMILY MEDICINE

## 2022-04-18 PROCEDURE — 1159F MED LIST DOCD IN RCRD: CPT | Mod: CPTII,S$GLB,, | Performed by: FAMILY MEDICINE

## 2022-04-18 PROCEDURE — 99999 PR PBB SHADOW E&M-EST. PATIENT-LVL III: ICD-10-PCS | Mod: PBBFAC,,, | Performed by: FAMILY MEDICINE

## 2022-04-18 RX ORDER — TRAZODONE HYDROCHLORIDE 50 MG/1
50 TABLET ORAL NIGHTLY
Qty: 30 TABLET | Refills: 0 | Status: SHIPPED | OUTPATIENT
Start: 2022-04-18 | End: 2022-05-16

## 2022-04-18 RX ORDER — PANTOPRAZOLE SODIUM 40 MG/1
40 TABLET, DELAYED RELEASE ORAL DAILY
Qty: 90 TABLET | Refills: 1 | Status: SHIPPED | OUTPATIENT
Start: 2022-04-18 | End: 2022-11-03

## 2022-04-18 RX ORDER — AMLODIPINE BESYLATE 2.5 MG/1
2.5 TABLET ORAL DAILY
Qty: 90 TABLET | Refills: 3 | Status: SHIPPED | OUTPATIENT
Start: 2022-04-18 | End: 2022-07-02 | Stop reason: SDUPTHER

## 2022-04-18 NOTE — PROGRESS NOTES
Lia Ragsdale  04/18/2022  [id number]    Romana Rivera MD  Patient Care Team:  Romana Rivera MD as PCP - General (Internal Medicine)  Aby Delgado LPN as Care Coordinator (Internal Medicine)    Has the patient seen any provider outside of the network since the last visit ? (no). If yes, HIPPA forms completed and records requested.      Visit Type:a scheduled routine follow-up visit    Chief Complaint:  Chief Complaint   Patient presents with    Annual Exam       History of Present Illness:  HPI Ms. Ragsdale presents today for her annual exam.     Snoring per   Not getting a lot of sleep     She has no other concerns besides weight gain in the lower abdomen  Recently started walking  Does not eat very much     Review of Systems   Constitutional: Negative for chills and fever.   HENT: Negative for congestion and tinnitus.    Eyes: Negative for blurred vision, pain and discharge.   Respiratory: Negative for cough and wheezing.    Cardiovascular: Negative for chest pain, palpitations, orthopnea and leg swelling.   Gastrointestinal: Negative for abdominal pain, blood in stool, constipation, diarrhea, heartburn, nausea and vomiting.   Genitourinary: Negative for dysuria, flank pain, frequency, hematuria and urgency.   Skin: Negative for itching and rash.   Neurological: Negative for dizziness, tingling and headaches.   Psychiatric/Behavioral: Negative for depression.         Screening Questionnaires:    In the last two weeks how often have you felt down, depressed, or hopeless ( no )    In the last two weeks how often have you had little interest or pleasure in doing  (no )    In the last two weeks how often have you been bothered by the following problems:  1. Feeling nervous, anxious, or on edge ( no )    2. Not being able to stop or control worrying ( no)    3. Worrying too much about different things ( intermittent)    4. Trouble relaxing ( no )    5. Being so restless that it is hard to sit  still  (frequent )    6. Becoming easily annoyed or irritable (no)    7. Feeling afraid as if something awful might happen (no )    How often do you have a drink containing Alcohol? occasional, social use     Do you exercise  (yes ) moderately active    Do you take a baby Aspirin daily ( no)    Do you have an advance directive ( no ) The patient was given information regarding Living Will/Durable Power-of- if requested.     The following were reviewed: Active problem list, medication list, allergies, family history, social history, and Health Maintenance.     History:  Past Medical History:   Diagnosis Date    Bladder instability 2015    Depression     History of depression in the past. Pt states it was from a stressful job.    Gallstones     General anesthetics causing adverse effect in therapeutic use     had respiratory distress after propofol    HPV (human papilloma virus) anogenital infection     Peptic ulcer disease      Past Surgical History:   Procedure Laterality Date     SECTION, LOW TRANSVERSE      x 1    CHOLECYSTECTOMY      COLONOSCOPY N/A 2021    Procedure: COLONOSCOPY;  Surgeon: Rebecca Willoughby MD;  Location: [facility];  Service: Endoscopy;  Laterality: N/A;    TUBAL LIGATION       Family History   Problem Relation Age of Onset    Colon cancer Father     Diabetes Mother     Hypertension Mother     Hypertension Sister     Breast cancer Sister 54        Stage 1     Social History     Socioeconomic History    Marital status:    Tobacco Use    Smoking status: Never Smoker    Smokeless tobacco: Never Used   Substance and Sexual Activity    Alcohol use: Yes     Comment: Socially drinks    Drug use: No    Sexual activity: Yes     Partners: Male     Patient Active Problem List   Diagnosis    Bladder instability    Essential hypertension    Family history of colon cancer in father     Review of patient's allergies indicates:  No Known  Allergies    Health Maintenance  Health Maintenance Topics with due status: Not Due       Topic Last Completion Date    TETANUS VACCINE 04/12/2017    Lipid Panel 03/16/2021    Cervical Cancer Screening 07/28/2021    Mammogram 09/24/2021    Colorectal Cancer Screening 11/01/2021     Health Maintenance Due   Topic Date Due    Hepatitis C Screening  Never done    Shingles Vaccine (1 of 2) Never done       Medications:  Current Outpatient Medications on File Prior to Visit   Medication Sig Dispense Refill    cetirizine (ZYRTEC) 10 MG tablet Take 10 mg by mouth daily as needed.       triamcinolone acetonide 0.1% (KENALOG) 0.1 % ointment Apply topically 2 (two) times daily. 15 g 0    valACYclovir (VALTREX) 1000 MG tablet Two pills twice daily for one day 4 tablet 1    [DISCONTINUED] amLODIPine (NORVASC) 2.5 MG tablet Take 1 tablet (2.5 mg total) by mouth once daily. 30 tablet 3    [DISCONTINUED] pantoprazole (PROTONIX) 40 MG tablet Take 1 tablet (40 mg total) by mouth once daily. 90 tablet 1     No current facility-administered medications on file prior to visit.       Medications have been reviewed and reconciled with patient at visit today.    Barriers to medications present (no )    Adverse reactions to current medications (no)    Over the counter medications reviewed (Yes) and if needed added to active Medication list.    Exam:  Vitals:    04/18/22 0711   BP: 124/76   Pulse: 74   Resp: 18   Temp: 98 °F (36.7 °C)     Weight: 80.8 kg (178 lb 2.1 oz)   Body mass index is 30.58 kg/m².      Physical Exam  Vitals reviewed.   Constitutional:       General: She is not in acute distress.  HENT:      Head: Normocephalic and atraumatic.      Right Ear: External ear normal.      Left Ear: External ear normal.      Nose: Nose normal.   Eyes:      General:         Right eye: No discharge.         Left eye: No discharge.      Pupils: Pupils are equal, round, and reactive to light.   Neck:      Thyroid: No thyromegaly.    Cardiovascular:      Rate and Rhythm: Normal rate and regular rhythm.      Heart sounds: Normal heart sounds. No murmur heard.  Pulmonary:      Effort: Pulmonary effort is normal. No respiratory distress.      Breath sounds: Normal breath sounds. No wheezing.   Abdominal:      General: Bowel sounds are normal. There is no distension.      Palpations: Abdomen is soft.      Tenderness: There is no abdominal tenderness.   Musculoskeletal:         General: Normal range of motion.      Cervical back: Normal range of motion and neck supple.   Skin:     General: Skin is warm and dry.      Findings: No rash.   Neurological:      Mental Status: She is alert and oriented to person, place, and time.      Coordination: Coordination normal.   Psychiatric:         Behavior: Behavior normal.         Laboratory Reviewed: (Yes)  Lab Results   Component Value Date    WBC 6.11 02/16/2017    HGB 12.0 02/16/2017    HCT 38.2 02/16/2017     02/16/2017    CHOL 223 (A) 03/16/2021    TRIG 73 03/16/2021    HDL 53 03/16/2021    ALT 10 06/27/2018    AST 16 06/27/2018     06/27/2018    K 4.3 06/27/2018     06/27/2018    CREATININE 0.8 06/27/2018    BUN 9 06/27/2018    CO2 29 06/27/2018    TSH 1.71 03/16/2021    INR 1.0 12/22/2011    GLUF 90 12/06/2011    HGBA1C 5.7 01/29/2008       Assessment:  The primary encounter diagnosis was Routine physical examination. Diagnoses of Essential hypertension, Gastroesophageal reflux disease without esophagitis, and Encounter for hepatitis C screening test for low risk patient were also pertinent to this visit.    Plan:  Routine physical examination    Essential hypertension  -     amLODIPine (NORVASC) 2.5 MG tablet; Take 1 tablet (2.5 mg total) by mouth once daily.  Dispense: 90 tablet; Refill: 3    Gastroesophageal reflux disease without esophagitis  -     pantoprazole (PROTONIX) 40 MG tablet; Take 1 tablet (40 mg total) by mouth once daily.  Dispense: 90 tablet; Refill: 1    Encounter  for hepatitis C screening test for low risk patient  -     Hepatitis C Antibody; Future; Expected date: 04/18/2022    Other orders  -     traZODone (DESYREL) 50 MG tablet; Take 1 tablet (50 mg total) by mouth every evening.  Dispense: 30 tablet; Refill: 0      -Patient's lab results were reviewed and discussed with patient  -Treatment options and alternatives were discussed with the patient. Patient expressed understanding. Patient was given the opportunity to ask questions and be an active participant in their medical care. Patient had no further questions or concerns at this time.   -Documentation of patient's health and condition was obtained from family member who was present during visit.  -Patient is an overall moderate risk for health complications from their medical conditions.     Follow up: follow up as needed   1 year for annual       Care Plan/Goals: Reviewed Yes   Goals        Patient Stated      Blood Pressure < 140/90 (pt-stated)               After visit summary printed and given to patient upon discharge.  Patient goals and care plan are included in After visit summary.

## 2022-05-15 NOTE — TELEPHONE ENCOUNTER
No new care gaps identified.  [facility] Embedded Care Gaps. Reference number: [id number]. 5/14/2022   9:37:13 PM CDT

## 2022-05-16 RX ORDER — TRAZODONE HYDROCHLORIDE 50 MG/1
TABLET ORAL
Qty: 30 TABLET | Refills: 0 | Status: SHIPPED | OUTPATIENT
Start: 2022-05-16 | End: 2022-10-04

## 2022-05-16 NOTE — TELEPHONE ENCOUNTER
Refill Routing Note   Medication(s) are not appropriate for processing by [facility] for the following reason(s):      - Medication is a new start (<3 months)    [facility] action(s):  Defer          Medication reconciliation completed: No     Appointments  past 12m or future 3m with PCP    Date Provider   Last Visit   4/18/2022 Romana Rivera MD   Next Visit   Visit date not found Romana Rivera MD   ED visits in past 90 days: 0        Note composed:10:04 AM 05/16/2022

## 2022-05-30 ENCOUNTER — PATIENT OUTREACH (OUTPATIENT)
Dept: ADMINISTRATIVE | Facility: HOSPITAL | Age: 57
End: 2022-05-30
Payer: COMMERCIAL

## 2022-06-06 ENCOUNTER — PATIENT MESSAGE (OUTPATIENT)
Dept: INTERNAL MEDICINE | Facility: CLINIC | Age: 57
End: 2022-06-06
Payer: COMMERCIAL

## 2022-06-06 RX ORDER — CLOTRIMAZOLE AND BETAMETHASONE DIPROPIONATE 10; .64 MG/G; MG/G
15 CREAM TOPICAL 2 TIMES DAILY
Qty: 15 G | Refills: 0 | Status: SHIPPED | OUTPATIENT
Start: 2022-06-06 | End: 2023-10-03

## 2022-07-27 ENCOUNTER — OFFICE VISIT (OUTPATIENT)
Dept: OBSTETRICS AND GYNECOLOGY | Facility: CLINIC | Age: 57
End: 2022-07-27
Payer: COMMERCIAL

## 2022-07-27 VITALS
SYSTOLIC BLOOD PRESSURE: 112 MMHG | HEIGHT: 64 IN | DIASTOLIC BLOOD PRESSURE: 78 MMHG | WEIGHT: 176.81 LBS | BODY MASS INDEX: 30.19 KG/M2

## 2022-07-27 DIAGNOSIS — N90.89 SKIN TAG OF LABIA: Primary | ICD-10-CM

## 2022-07-27 PROCEDURE — 1160F PR REVIEW ALL MEDS BY PRESCRIBER/CLIN PHARMACIST DOCUMENTED: ICD-10-PCS | Mod: CPTII,S$GLB,, | Performed by: OBSTETRICS & GYNECOLOGY

## 2022-07-27 PROCEDURE — 3074F PR MOST RECENT SYSTOLIC BLOOD PRESSURE < 130 MM HG: ICD-10-PCS | Mod: CPTII,S$GLB,, | Performed by: OBSTETRICS & GYNECOLOGY

## 2022-07-27 PROCEDURE — 99212 OFFICE O/P EST SF 10 MIN: CPT | Mod: 25,S$GLB,, | Performed by: OBSTETRICS & GYNECOLOGY

## 2022-07-27 PROCEDURE — 1160F RVW MEDS BY RX/DR IN RCRD: CPT | Mod: CPTII,S$GLB,, | Performed by: OBSTETRICS & GYNECOLOGY

## 2022-07-27 PROCEDURE — 88304 TISSUE EXAM BY PATHOLOGIST: CPT | Mod: 26,,, | Performed by: PATHOLOGY

## 2022-07-27 PROCEDURE — 99999 PR PBB SHADOW E&M-EST. PATIENT-LVL III: ICD-10-PCS | Mod: PBBFAC,,, | Performed by: OBSTETRICS & GYNECOLOGY

## 2022-07-27 PROCEDURE — 3078F PR MOST RECENT DIASTOLIC BLOOD PRESSURE < 80 MM HG: ICD-10-PCS | Mod: CPTII,S$GLB,, | Performed by: OBSTETRICS & GYNECOLOGY

## 2022-07-27 PROCEDURE — 88304 TISSUE EXAM BY PATHOLOGIST: CPT | Performed by: PATHOLOGY

## 2022-07-27 PROCEDURE — 1159F MED LIST DOCD IN RCRD: CPT | Mod: CPTII,S$GLB,, | Performed by: OBSTETRICS & GYNECOLOGY

## 2022-07-27 PROCEDURE — 88304 PR  SURG PATH,LEVEL III: ICD-10-PCS | Mod: 26,,, | Performed by: PATHOLOGY

## 2022-07-27 PROCEDURE — 3074F SYST BP LT 130 MM HG: CPT | Mod: CPTII,S$GLB,, | Performed by: OBSTETRICS & GYNECOLOGY

## 2022-07-27 PROCEDURE — 3008F BODY MASS INDEX DOCD: CPT | Mod: CPTII,S$GLB,, | Performed by: OBSTETRICS & GYNECOLOGY

## 2022-07-27 PROCEDURE — 1159F PR MEDICATION LIST DOCUMENTED IN MEDICAL RECORD: ICD-10-PCS | Mod: CPTII,S$GLB,, | Performed by: OBSTETRICS & GYNECOLOGY

## 2022-07-27 PROCEDURE — 99212 PR OFFICE/OUTPT VISIT, EST, LEVL II, 10-19 MIN: ICD-10-PCS | Mod: 25,S$GLB,, | Performed by: OBSTETRICS & GYNECOLOGY

## 2022-07-27 PROCEDURE — 99999 PR PBB SHADOW E&M-EST. PATIENT-LVL III: CPT | Mod: PBBFAC,,, | Performed by: OBSTETRICS & GYNECOLOGY

## 2022-07-27 PROCEDURE — 3008F PR BODY MASS INDEX (BMI) DOCUMENTED: ICD-10-PCS | Mod: CPTII,S$GLB,, | Performed by: OBSTETRICS & GYNECOLOGY

## 2022-07-27 PROCEDURE — 3078F DIAST BP <80 MM HG: CPT | Mod: CPTII,S$GLB,, | Performed by: OBSTETRICS & GYNECOLOGY

## 2022-07-27 NOTE — PROCEDURES
Biopsy    Date/Time: 7/27/2022 10:30 AM  Performed by: DONN Salgado MD  Authorized by: DONN Salgado MD   Preparation: Patient was prepped and draped in the usual sterile fashion (betadine ).  Local anesthesia used: yes    Anesthesia:  Local anesthesia used: yes  Local Anesthetic: lidocaine 1% with epinephrine  Anesthetic total: 3 mL    Sedation:  Patient sedated: no    Patient tolerance: patient tolerated the procedure well with no immediate complications      Small skin tag above the clitoris in the midline   Excision with knife  One 3 zero Chromic suture placed   1 cc ebl   Specimen sent to pathology

## 2022-08-03 ENCOUNTER — PATIENT MESSAGE (OUTPATIENT)
Dept: INTERNAL MEDICINE | Facility: CLINIC | Age: 57
End: 2022-08-03
Payer: COMMERCIAL

## 2022-08-03 RX ORDER — FLUCONAZOLE 150 MG/1
150 TABLET ORAL DAILY
Qty: 1 TABLET | Refills: 0 | Status: SHIPPED | OUTPATIENT
Start: 2022-08-03 | End: 2022-08-04

## 2022-08-04 LAB
FINAL PATHOLOGIC DIAGNOSIS: NORMAL
Lab: NORMAL

## 2022-08-05 ENCOUNTER — TELEPHONE (OUTPATIENT)
Dept: INTERNAL MEDICINE | Facility: CLINIC | Age: 57
End: 2022-08-05
Payer: COMMERCIAL

## 2022-08-05 DIAGNOSIS — Z12.31 ENCOUNTER FOR SCREENING MAMMOGRAM FOR BREAST CANCER: Primary | ICD-10-CM

## 2022-09-26 ENCOUNTER — HOSPITAL ENCOUNTER (OUTPATIENT)
Dept: RADIOLOGY | Facility: HOSPITAL | Age: 57
Discharge: HOME | End: 2022-09-26
Attending: FAMILY MEDICINE
Payer: COMMERCIAL

## 2022-09-26 DIAGNOSIS — Z12.31 ENCOUNTER FOR SCREENING MAMMOGRAM FOR BREAST CANCER: ICD-10-CM

## 2022-09-26 PROCEDURE — 77063 BREAST TOMOSYNTHESIS BI: CPT | Mod: 26,,, | Performed by: RADIOLOGY

## 2022-09-26 PROCEDURE — 77063 BREAST TOMOSYNTHESIS BI: CPT | Mod: TC

## 2022-09-26 PROCEDURE — 77067 MAMMO DIGITAL SCREENING BILAT WITH TOMO: ICD-10-PCS | Mod: 26,,, | Performed by: RADIOLOGY

## 2022-09-26 PROCEDURE — 77063 MAMMO DIGITAL SCREENING BILAT WITH TOMO: ICD-10-PCS | Mod: 26,,, | Performed by: RADIOLOGY

## 2022-09-26 PROCEDURE — 77067 SCR MAMMO BI INCL CAD: CPT | Mod: 26,,, | Performed by: RADIOLOGY

## 2022-09-26 PROCEDURE — 77067 SCR MAMMO BI INCL CAD: CPT | Mod: TC

## 2022-10-03 DIAGNOSIS — Z86.19 H/O COLD SORES: ICD-10-CM

## 2022-10-03 RX ORDER — VALACYCLOVIR HYDROCHLORIDE 1 G/1
TABLET, FILM COATED ORAL
Qty: 4 TABLET | Refills: 1 | Status: SHIPPED | OUTPATIENT
Start: 2022-10-03 | End: 2023-04-14

## 2022-10-04 ENCOUNTER — OFFICE VISIT (OUTPATIENT)
Dept: INTERNAL MEDICINE | Facility: CLINIC | Age: 57
End: 2022-10-04
Payer: COMMERCIAL

## 2022-10-04 ENCOUNTER — HOSPITAL ENCOUNTER (OUTPATIENT)
Dept: RADIOLOGY | Facility: HOSPITAL | Age: 57
Discharge: HOME | End: 2022-10-04
Attending: PHYSICIAN ASSISTANT
Payer: COMMERCIAL

## 2022-10-04 VITALS
DIASTOLIC BLOOD PRESSURE: 66 MMHG | HEART RATE: 87 BPM | TEMPERATURE: 98 F | WEIGHT: 173.94 LBS | BODY MASS INDEX: 29.7 KG/M2 | HEIGHT: 64 IN | RESPIRATION RATE: 17 BRPM | SYSTOLIC BLOOD PRESSURE: 136 MMHG | OXYGEN SATURATION: 100 %

## 2022-10-04 DIAGNOSIS — M54.50 LUMBAR PAIN WITH RADIATION DOWN LEFT LEG: ICD-10-CM

## 2022-10-04 DIAGNOSIS — M54.50 LUMBAR PAIN WITH RADIATION DOWN LEFT LEG: Primary | ICD-10-CM

## 2022-10-04 DIAGNOSIS — M79.605 LUMBAR PAIN WITH RADIATION DOWN LEFT LEG: Primary | ICD-10-CM

## 2022-10-04 DIAGNOSIS — M79.605 LUMBAR PAIN WITH RADIATION DOWN LEFT LEG: ICD-10-CM

## 2022-10-04 PROCEDURE — 99999 PR PBB SHADOW E&M-EST. PATIENT-LVL V: ICD-10-PCS | Mod: PBBFAC,,, | Performed by: PHYSICIAN ASSISTANT

## 2022-10-04 PROCEDURE — 1160F PR REVIEW ALL MEDS BY PRESCRIBER/CLIN PHARMACIST DOCUMENTED: ICD-10-PCS | Mod: CPTII,S$GLB,, | Performed by: PHYSICIAN ASSISTANT

## 2022-10-04 PROCEDURE — 99213 PR OFFICE/OUTPT VISIT, EST, LEVL III, 20-29 MIN: ICD-10-PCS | Mod: S$GLB,,, | Performed by: PHYSICIAN ASSISTANT

## 2022-10-04 PROCEDURE — 3008F BODY MASS INDEX DOCD: CPT | Mod: CPTII,S$GLB,, | Performed by: PHYSICIAN ASSISTANT

## 2022-10-04 PROCEDURE — 1159F MED LIST DOCD IN RCRD: CPT | Mod: CPTII,S$GLB,, | Performed by: PHYSICIAN ASSISTANT

## 2022-10-04 PROCEDURE — 3075F SYST BP GE 130 - 139MM HG: CPT | Mod: CPTII,S$GLB,, | Performed by: PHYSICIAN ASSISTANT

## 2022-10-04 PROCEDURE — 3078F PR MOST RECENT DIASTOLIC BLOOD PRESSURE < 80 MM HG: ICD-10-PCS | Mod: CPTII,S$GLB,, | Performed by: PHYSICIAN ASSISTANT

## 2022-10-04 PROCEDURE — 99213 OFFICE O/P EST LOW 20 MIN: CPT | Mod: S$GLB,,, | Performed by: PHYSICIAN ASSISTANT

## 2022-10-04 PROCEDURE — 72100 X-RAY EXAM L-S SPINE 2/3 VWS: CPT | Mod: 26,,, | Performed by: RADIOLOGY

## 2022-10-04 PROCEDURE — 99999 PR PBB SHADOW E&M-EST. PATIENT-LVL V: CPT | Mod: PBBFAC,,, | Performed by: PHYSICIAN ASSISTANT

## 2022-10-04 PROCEDURE — 72100 X-RAY EXAM L-S SPINE 2/3 VWS: CPT | Mod: TC

## 2022-10-04 PROCEDURE — 3075F PR MOST RECENT SYSTOLIC BLOOD PRESS GE 130-139MM HG: ICD-10-PCS | Mod: CPTII,S$GLB,, | Performed by: PHYSICIAN ASSISTANT

## 2022-10-04 PROCEDURE — 1160F RVW MEDS BY RX/DR IN RCRD: CPT | Mod: CPTII,S$GLB,, | Performed by: PHYSICIAN ASSISTANT

## 2022-10-04 PROCEDURE — 3078F DIAST BP <80 MM HG: CPT | Mod: CPTII,S$GLB,, | Performed by: PHYSICIAN ASSISTANT

## 2022-10-04 PROCEDURE — 3008F PR BODY MASS INDEX (BMI) DOCUMENTED: ICD-10-PCS | Mod: CPTII,S$GLB,, | Performed by: PHYSICIAN ASSISTANT

## 2022-10-04 PROCEDURE — 72100 XR LUMBAR SPINE AP AND LATERAL: ICD-10-PCS | Mod: 26,,, | Performed by: RADIOLOGY

## 2022-10-04 PROCEDURE — 1159F PR MEDICATION LIST DOCUMENTED IN MEDICAL RECORD: ICD-10-PCS | Mod: CPTII,S$GLB,, | Performed by: PHYSICIAN ASSISTANT

## 2022-10-04 RX ORDER — KETOROLAC TROMETHAMINE 10 MG/1
10 TABLET, FILM COATED ORAL EVERY 6 HOURS PRN
Qty: 20 TABLET | Refills: 0 | Status: SHIPPED | OUTPATIENT
Start: 2022-10-04 | End: 2023-04-14

## 2022-10-04 RX ORDER — PREDNISONE 10 MG/1
10 TABLET ORAL 2 TIMES DAILY
Qty: 10 TABLET | Refills: 0 | Status: SHIPPED | OUTPATIENT
Start: 2022-10-04 | End: 2022-10-09

## 2022-10-04 RX ORDER — METHOCARBAMOL 750 MG/1
750 TABLET, FILM COATED ORAL EVERY 8 HOURS PRN
Qty: 20 TABLET | Refills: 0 | Status: SHIPPED | OUTPATIENT
Start: 2022-10-04 | End: 2023-04-14

## 2022-10-04 NOTE — LETTER
October 4, 2022      [facility]  [id number] [place] [id number]  Phone: [phone]  Fax: [phone]       Patient: Lia Ragsdale   YOB: 1965  Date of Visit: 10/04/2022    To Whom It May Concern:    Suzette Ragsdale  was at [facility] on 10/04/2022. She may return to work/school on 10/5/22 with no restrictions. If you have any questions or concerns, or if I can be of further assistance, please do not hesitate to contact me.    Sincerely,    Viviane River PA-C
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
## 2022-11-03 DIAGNOSIS — K21.9 GASTROESOPHAGEAL REFLUX DISEASE WITHOUT ESOPHAGITIS: ICD-10-CM

## 2022-11-03 RX ORDER — PANTOPRAZOLE SODIUM 40 MG/1
TABLET, DELAYED RELEASE ORAL
Qty: 90 TABLET | Refills: 1 | Status: SHIPPED | OUTPATIENT
Start: 2022-11-03 | End: 2023-04-30 | Stop reason: SDUPTHER

## 2022-11-03 NOTE — TELEPHONE ENCOUNTER
Refill Decision Note   Lia Ragsdale  is requesting a refill authorization.  Brief Assessment and Rationale for Refill:  Approve    -Medication-Related Problems Identified: Requires labs  Medication Therapy Plan:       Medication Reconciliation Completed: No   Comments:     Provider Staff:     Action is required for this patient.   Please see care gap opportunities below in Care Due Message.     Thanks!  [facility]     Appointments      Date Provider   Last Visit   4/18/2022 Romana Rivera MD   Next Visit   Visit date not found Romana Rivera MD     Note composed:12:12 PM 11/03/2022           Note composed:12:12 PM 11/03/2022

## 2022-12-08 ENCOUNTER — HOSPITAL ENCOUNTER (EMERGENCY)
Facility: HOSPITAL | Age: 57
Discharge: HOME | End: 2022-12-08
Attending: EMERGENCY MEDICINE
Payer: COMMERCIAL

## 2022-12-08 VITALS
WEIGHT: 174.94 LBS | TEMPERATURE: 101 F | SYSTOLIC BLOOD PRESSURE: 151 MMHG | HEART RATE: 118 BPM | DIASTOLIC BLOOD PRESSURE: 85 MMHG | OXYGEN SATURATION: 98 % | BODY MASS INDEX: 30.03 KG/M2 | RESPIRATION RATE: 19 BRPM

## 2022-12-08 DIAGNOSIS — R50.9 FEVER, UNSPECIFIED FEVER CAUSE: ICD-10-CM

## 2022-12-08 DIAGNOSIS — J02.9 PHARYNGITIS, UNSPECIFIED ETIOLOGY: Primary | ICD-10-CM

## 2022-12-08 LAB
INFLUENZA A, MOLECULAR: NEGATIVE
INFLUENZA B, MOLECULAR: NEGATIVE
SARS-COV-2 RDRP RESP QL NAA+PROBE: NEGATIVE
SPECIMEN SOURCE: NORMAL

## 2022-12-08 PROCEDURE — 87502 INFLUENZA DNA AMP PROBE: CPT | Performed by: EMERGENCY MEDICINE

## 2022-12-08 PROCEDURE — U0002 COVID-19 LAB TEST NON-CDC: HCPCS | Performed by: EMERGENCY MEDICINE

## 2022-12-08 PROCEDURE — 99283 EMERGENCY DEPT VISIT LOW MDM: CPT

## 2022-12-08 RX ORDER — AZITHROMYCIN 250 MG/1
250 TABLET, FILM COATED ORAL DAILY
Qty: 6 TABLET | Refills: 0 | Status: SHIPPED | OUTPATIENT
Start: 2022-12-08 | End: 2023-04-14

## 2022-12-08 NOTE — Clinical Note
"Lia Yaoruthie Ragsdale was seen and treated in our emergency department on 12/8/2022.  She may return to work on 12/12/2022.       If you have any questions or concerns, please don't hesitate to call.      John Mcdowell NP"

## 2022-12-08 NOTE — ED PROVIDER NOTES
Encounter Date: 2022       History     Chief Complaint   Patient presents with    Fever     Pt reports 103 fever at home and has been taking Tylenol. Night sweats, chills, and nausea x 3 days     57-year-old female with complaint of sudden onset of fever and chills for the past 3 days.  Patient reports mild cough with moderate sore throat.  Mild nasal congestion.  No shortness of breath.  No back pain.  No nausea or vomiting.      Review of patient's allergies indicates:  No Known Allergies  Past Medical History:   Diagnosis Date    Bladder instability 2015    Depression     History of depression in the past. Pt states it was from a stressful job.    Gallstones     General anesthetics causing adverse effect in therapeutic use     had respiratory distress after propofol    HPV (human papilloma virus) anogenital infection     Peptic ulcer disease      Past Surgical History:   Procedure Laterality Date     SECTION, LOW TRANSVERSE      x 1    CHOLECYSTECTOMY      COLONOSCOPY N/A 2021    Procedure: COLONOSCOPY;  Surgeon: Rebecca Willoughby MD;  Location: [facility];  Service: Endoscopy;  Laterality: N/A;    TUBAL LIGATION       Family History   Problem Relation Age of Onset    Colon cancer Father     Diabetes Mother     Hypertension Mother     Hypertension Sister     Breast cancer Sister 54        Stage 1     Social History     Tobacco Use    Smoking status: Never    Smokeless tobacco: Never   Substance Use Topics    Alcohol use: Yes     Comment: Socially drinks    Drug use: No     Review of Systems   Constitutional:  Positive for chills and fever.   HENT:  Positive for sore throat.    Respiratory:  Negative for shortness of breath.    Cardiovascular:  Negative for chest pain.   Gastrointestinal:  Negative for nausea.   Genitourinary:  Negative for dysuria.   Musculoskeletal:  Negative for back pain.   Skin:  Negative for rash.   Neurological:  Negative for weakness.   Hematological:  Does not  bruise/bleed easily.     Physical Exam     Initial Vitals [22 0606]   BP Pulse Resp Temp SpO2   (!) 151/85 (!) 118 19 (!) 101.2 °F (38.4 °C) 98 %      MAP       --         Physical Exam    Nursing note and vitals reviewed.  Constitutional: She appears well-developed and well-nourished.   HENT:   Head: Normocephalic and atraumatic.   Right Ear: External ear normal.   Left Ear: External ear normal.   Mouth/Throat: Oropharyngeal exudate present.   Eyes: Conjunctivae and EOM are normal. Pupils are equal, round, and reactive to light.   Neck: Neck supple.   Normal range of motion.  Cardiovascular:  Normal rate, regular rhythm, normal heart sounds and intact distal pulses.           Pulmonary/Chest: Breath sounds normal.   Abdominal: Abdomen is soft. There is no abdominal tenderness. There is no rebound and no guarding.   Musculoskeletal:         General: Normal range of motion.      Cervical back: Normal range of motion and neck supple.     Lymphadenopathy:     She has cervical adenopathy.   Neurological: She is alert and oriented to person, place, and time. She has normal strength and normal reflexes.   Skin: Skin is warm and dry.   Psychiatric: She has a normal mood and affect. Her behavior is normal. Thought content normal.       ED Course   Procedures  Labs Reviewed   INFLUENZA A & B BY MOLECULAR   SARS-COV-2 RNA AMPLIFICATION, QUAL          Imaging Results    None          Medications - No data to display  Medical Decision Makin:19 AM  Patient tested negative for COVID and flu.  Patient has exudate on tonsils.  Positive cervical adenopathy.  Positive fever.  Patient has already been waiting 2 hours and patient does not want to wait for strep test.  Patient requesting to be treated for strep without test.                        Clinical Impression:   Final diagnoses:  [J02.9] Pharyngitis, unspecified etiology (Primary)  [R50.9] Fever, unspecified fever cause        ED Disposition Condition    Discharge  Stable          ED Prescriptions       Medication Sig Dispense Start Date End Date Auth. Provider    azithromycin (Z-PEPITO) 250 MG tablet Take 1 tablet (250 mg total) by mouth once daily. Take first 2 tablets together, then 1 every day until finished. 6 tablet 12/8/2022 -- John Mcdowell NP          Follow-up Information       Follow up With Specialties Details Why Contact Info    Romana Rivera MD Internal Medicine Schedule an appointment as soon as possible for a visit  As needed [address] [place] [address] [address]ovanna [place]ANGO [id number]  [phone]               John Mcdowell NP  12/08/22 0819

## 2023-03-14 LAB
ALBUMIN SERPL BCP-MCNC: 4.3 G/DL (ref 3.8–4.9)
ALBUMIN/GLOBULIN RATIO: 1.7 (ref 1.2–2.2)
ALP SERPL-CCNC: 63 U/L (ref 44–121)
ALT SERPL W P-5'-P-CCNC: 12 U/L (ref 0–32)
AST SERPL-CCNC: 16 U/L (ref 0–40)
BILIRUB SERPL-MCNC: 0.4 MG/DL (ref 0–1.2)
BUN SERPL-MCNC: 7 MG/DL (ref 6–24)
BUN/CREAT RATIO: 9 (ref 9–23)
CALCIUM SERPL-MCNC: 9.4 MG/DL (ref 8.7–10.2)
CHLORIDE SERPL-SCNC: 105 MMOL/L (ref 96–106)
CHOLESTEROL, TOTAL: 228 (ref 100–199)
CREAT SERPL-MCNC: 0.8 MG/DL (ref 0.6–1)
EGFR: 90
GLOBULIN: 2.5 (ref 1.5–4.5)
GLUCOSE SERPL-MCNC: 104 MG/DL (ref 70–99)
HBA1C MFR BLD: 5.7 % (ref 4.8–5.6)
HDL/CHOLESTEROL RATIO: 4.5 % (ref 0–4.4)
HDLC SERPL-MCNC: 51 MG/DL
LDLC SERPL CALC-MCNC: 157 MG/DL (ref 0–99)
POTASSIUM SERPL-SCNC: 4.7 MMOL/L (ref 3.5–5.2)
PROT SNV-MCNC: 6.8 G/L (ref 6–8.5)
SODIUM BLD-SCNC: 143 MMOL/L (ref 134–144)
TRIGL SERPL-MCNC: 114 MG/DL (ref 0–149)
VLDLC SERPL-MCNC: 20 MG/DL (ref 5–40)

## 2023-03-17 ENCOUNTER — PATIENT MESSAGE (OUTPATIENT)
Dept: INTERNAL MEDICINE | Facility: CLINIC | Age: 58
End: 2023-03-17
Payer: COMMERCIAL

## 2023-04-06 ENCOUNTER — PATIENT OUTREACH (OUTPATIENT)
Dept: ADMINISTRATIVE | Facility: HOSPITAL | Age: 58
End: 2023-04-06
Payer: COMMERCIAL

## 2023-04-14 ENCOUNTER — OFFICE VISIT (OUTPATIENT)
Dept: INTERNAL MEDICINE | Facility: CLINIC | Age: 58
End: 2023-04-14
Payer: COMMERCIAL

## 2023-04-14 VITALS
HEART RATE: 77 BPM | DIASTOLIC BLOOD PRESSURE: 80 MMHG | BODY MASS INDEX: 30.22 KG/M2 | WEIGHT: 177 LBS | SYSTOLIC BLOOD PRESSURE: 126 MMHG | RESPIRATION RATE: 18 BRPM | OXYGEN SATURATION: 98 % | TEMPERATURE: 97 F | HEIGHT: 64 IN

## 2023-04-14 DIAGNOSIS — Z00.00 ROUTINE PHYSICAL EXAMINATION: Primary | ICD-10-CM

## 2023-04-14 DIAGNOSIS — Z78.9 STATIN INTOLERANCE: ICD-10-CM

## 2023-04-14 DIAGNOSIS — K64.0 GRADE I HEMORRHOIDS: ICD-10-CM

## 2023-04-14 PROCEDURE — 3079F DIAST BP 80-89 MM HG: CPT | Mod: CPTII,S$GLB,, | Performed by: FAMILY MEDICINE

## 2023-04-14 PROCEDURE — 3008F BODY MASS INDEX DOCD: CPT | Mod: CPTII,S$GLB,, | Performed by: FAMILY MEDICINE

## 2023-04-14 PROCEDURE — 1159F MED LIST DOCD IN RCRD: CPT | Mod: CPTII,S$GLB,, | Performed by: FAMILY MEDICINE

## 2023-04-14 PROCEDURE — 3074F PR MOST RECENT SYSTOLIC BLOOD PRESSURE < 130 MM HG: ICD-10-PCS | Mod: CPTII,S$GLB,, | Performed by: FAMILY MEDICINE

## 2023-04-14 PROCEDURE — 1159F PR MEDICATION LIST DOCUMENTED IN MEDICAL RECORD: ICD-10-PCS | Mod: CPTII,S$GLB,, | Performed by: FAMILY MEDICINE

## 2023-04-14 PROCEDURE — 3074F SYST BP LT 130 MM HG: CPT | Mod: CPTII,S$GLB,, | Performed by: FAMILY MEDICINE

## 2023-04-14 PROCEDURE — 99396 PREV VISIT EST AGE 40-64: CPT | Mod: S$GLB,,, | Performed by: FAMILY MEDICINE

## 2023-04-14 PROCEDURE — 99999 PR PBB SHADOW E&M-EST. PATIENT-LVL IV: CPT | Mod: PBBFAC,,, | Performed by: FAMILY MEDICINE

## 2023-04-14 PROCEDURE — 3044F PR MOST RECENT HEMOGLOBIN A1C LEVEL <7.0%: ICD-10-PCS | Mod: CPTII,S$GLB,, | Performed by: FAMILY MEDICINE

## 2023-04-14 PROCEDURE — 99999 PR PBB SHADOW E&M-EST. PATIENT-LVL IV: ICD-10-PCS | Mod: PBBFAC,,, | Performed by: FAMILY MEDICINE

## 2023-04-14 PROCEDURE — 3079F PR MOST RECENT DIASTOLIC BLOOD PRESSURE 80-89 MM HG: ICD-10-PCS | Mod: CPTII,S$GLB,, | Performed by: FAMILY MEDICINE

## 2023-04-14 PROCEDURE — 99396 PR PREVENTIVE VISIT,EST,40-64: ICD-10-PCS | Mod: S$GLB,,, | Performed by: FAMILY MEDICINE

## 2023-04-14 PROCEDURE — 3044F HG A1C LEVEL LT 7.0%: CPT | Mod: CPTII,S$GLB,, | Performed by: FAMILY MEDICINE

## 2023-04-14 PROCEDURE — 3008F PR BODY MASS INDEX (BMI) DOCUMENTED: ICD-10-PCS | Mod: CPTII,S$GLB,, | Performed by: FAMILY MEDICINE

## 2023-04-14 NOTE — PROGRESS NOTES
Lia Ragsdale  04/14/2023  [id number]    Romana Rivera MD  Patient Care Team:  Romana Rivera MD as PCP - General (Internal Medicine)  Aby Delgado LPN as Care Coordinator (Internal Medicine)    Has the patient seen any provider outside of the network since the last visit ? (no). If yes, HIPPA forms completed and records requested.      Visit Type:a scheduled routine follow-up visit    Chief Complaint:  Chief Complaint   Patient presents with    Annual Exam       History of Present Illness:  HPI Ms Ragsdale presents today for her annual exam.     HM reviewed   Declines shingrix today     No change in family , medical or surgical history    Hemorrhoids   Irritated     Review of Systems   Constitutional:  Negative for chills and fever.   HENT:  Negative for congestion and tinnitus.    Eyes:  Negative for blurred vision, pain and discharge.   Respiratory:  Negative for cough and wheezing.    Cardiovascular:  Negative for chest pain, palpitations, orthopnea and leg swelling.   Gastrointestinal:  Negative for abdominal pain, blood in stool, constipation, diarrhea, heartburn, nausea and vomiting.   Genitourinary:  Negative for dysuria, flank pain, frequency, hematuria and urgency.   Skin:  Negative for itching and rash.   Neurological:  Negative for dizziness, tingling and headaches.   Psychiatric/Behavioral:  Negative for depression.        Screening Questionnaires:    In the last two weeks how often have you felt down, depressed, or hopeless ( intermittent )    In the last two weeks how often have you had little interest or pleasure in doing  (no )    In the last two weeks how often have you been bothered by the following problems:  1. Feeling nervous, anxious, or on edge ( no )    2. Not being able to stop or control worrying ( no)    3. Worrying too much about different things ( intermittent)    4. Trouble relaxing ( no )    5. Being so restless that it is hard to sit still  (no )    6. Becoming easily  annoyed or irritable (no)    7. Feeling afraid as if something awful might happen (no )    How often do you have a drink containing Alcohol? occasional, social use     Do you exercise  (no ) not active    Do you take a baby Aspirin daily ( no)    Do you have an advance directive ( no ) The patient was given information regarding Living Will/Durable Power-of- if requested.     The following were reviewed: Active problem list, medication list, allergies, family history, social history, and Health Maintenance.     History:  Past Medical History:   Diagnosis Date    Bladder instability 2015    Depression     History of depression in the past. Pt states it was from a stressful job.    Gallstones     General anesthetics causing adverse effect in therapeutic use     had respiratory distress after propofol    HPV (human papilloma virus) anogenital infection     Peptic ulcer disease      Past Surgical History:   Procedure Laterality Date     SECTION, LOW TRANSVERSE      x 1    CHOLECYSTECTOMY      COLONOSCOPY N/A 2021    Procedure: COLONOSCOPY;  Surgeon: Rebecca Willoughby MD;  Location: [facility];  Service: Endoscopy;  Laterality: N/A;    TUBAL LIGATION       Family History   Problem Relation Age of Onset    Colon cancer Father     Diabetes Mother     Hypertension Mother     Hypertension Sister     Breast cancer Sister 54        Stage 1     Social History     Socioeconomic History    Marital status:    Tobacco Use    Smoking status: Never    Smokeless tobacco: Never   Substance and Sexual Activity    Alcohol use: Yes     Comment: Socially drinks    Drug use: No    Sexual activity: Yes     Partners: Male     Patient Active Problem List   Diagnosis    Bladder instability    Essential hypertension    Family history of colon cancer in father    Statin intolerance     Review of patient's allergies indicates:  No Known Allergies    Health Maintenance  Health Maintenance Topics with due status: Not  Due       Topic Last Completion Date    TETANUS VACCINE 04/12/2017    Cervical Cancer Screening 07/28/2021    Colorectal Cancer Screening 11/01/2021    Mammogram 09/26/2022    Hemoglobin A1c (Diabetic Prevention Screening) 03/14/2023    Lipid Panel 03/14/2023     Health Maintenance Due   Topic Date Due    Hepatitis C Screening  Never done    Shingles Vaccine (1 of 2) Never done       Medications:  Current Outpatient Medications on File Prior to Visit   Medication Sig Dispense Refill    amLODIPine (NORVASC) 2.5 MG tablet Take 1 tablet (2.5 mg total) by mouth once daily. 90 tablet 3    cetirizine (ZYRTEC) 10 MG tablet Take 10 mg by mouth daily as needed.       clotrimazole-betamethasone 1-0.05% (LOTRISONE) cream Apply 15 g topically 2 (two) times daily. 15 g 0    pantoprazole (PROTONIX) 40 MG tablet TAKE 1 TABLET(40 MG) BY MOUTH EVERY DAY 90 tablet 1    [DISCONTINUED] azithromycin (Z-PEPITO) 250 MG tablet Take 1 tablet (250 mg total) by mouth once daily. Take first 2 tablets together, then 1 every day until finished. 6 tablet 0    [DISCONTINUED] ketorolac (TORADOL) 10 mg tablet Take 1 tablet (10 mg total) by mouth every 6 (six) hours as needed for Pain. 20 tablet 0    [DISCONTINUED] methocarbamoL (ROBAXIN) 750 MG Tab Take 1 tablet (750 mg total) by mouth every 8 (eight) hours as needed (muscle spasm). 20 tablet 0    [DISCONTINUED] valACYclovir (VALTREX) 1000 MG tablet Two pills twice daily for one day 4 tablet 1     No current facility-administered medications on file prior to visit.       Medications have been reviewed and reconciled with patient at visit today.    Barriers to medications present (no )    Adverse reactions to current medications (no)    Over the counter medications reviewed (Yes) and if needed added to active Medication list.    Exam:  Vitals:    04/14/23 1014   BP: 126/80   Pulse: 77   Resp: 18   Temp: 97.3 °F (36.3 °C)     Weight: 80.3 kg (177 lb 0.5 oz)   Body mass index is 30.39 kg/m².      Physical  Exam  Vitals reviewed.   Constitutional:       General: She is not in acute distress.     Appearance: Normal appearance.   HENT:      Head: Normocephalic and atraumatic.      Right Ear: External ear normal.      Left Ear: External ear normal.      Nose: Nose normal.   Eyes:      General:         Right eye: No discharge.         Left eye: No discharge.      Pupils: Pupils are equal, round, and reactive to light.   Neck:      Thyroid: No thyromegaly.   Cardiovascular:      Rate and Rhythm: Normal rate and regular rhythm.      Heart sounds: Normal heart sounds. No murmur heard.  Pulmonary:      Effort: Pulmonary effort is normal. No respiratory distress.      Breath sounds: Normal breath sounds. No wheezing.   Abdominal:      General: Bowel sounds are normal. There is no distension.      Palpations: Abdomen is soft.      Tenderness: There is no abdominal tenderness.   Musculoskeletal:         General: Normal range of motion.      Cervical back: Normal range of motion and neck supple.   Skin:     General: Skin is warm and dry.      Findings: No rash.   Neurological:      Mental Status: She is alert and oriented to person, place, and time.      Coordination: Coordination normal.   Psychiatric:         Behavior: Behavior normal.     Laboratory Reviewed: (Yes)  Lab Results   Component Value Date    WBC 6.11 02/16/2017    HGB 12.0 02/16/2017    HCT 38.2 02/16/2017     02/16/2017    CHOL 236 (A) 03/15/2022    TRIG 114 03/14/2023    HDL 51 03/14/2023    ALT 12 03/14/2023    AST 16 03/14/2023     03/14/2023    K 4.7 03/14/2023     03/14/2023    CREATININE 0.8 03/14/2023    BUN 7 03/14/2023    CO2 29 06/27/2018    TSH 1.89 03/15/2022    INR 1.0 12/22/2011    GLUF 90 12/06/2011    HGBA1C 5.7 (H) 03/14/2023       Assessment:  The primary encounter diagnosis was Routine physical examination. Diagnoses of Statin intolerance and Grade I hemorrhoids were also pertinent to this visit.    Plan:  Routine physical  examination    Statin intolerance    Grade I hemorrhoids  -     hydrocortisone-pramoxine (PROCTOFOAM-HS) rectal foam; Place 1 applicator rectally 2 (two) times daily.  Dispense: 10 g; Refill: 2  -     Ambulatory referral/consult to Colorectal Surgery; Future; Expected date: 04/21/2023      -Patient's lab results were reviewed and discussed with patient  -Treatment options and alternatives were discussed with the patient. Patient expressed understanding. Patient was given the opportunity to ask questions and be an active participant in their medical care. Patient had no further questions or concerns at this time.   -Documentation of patient's health and condition was obtained from family member who was present during visit.  -Patient is an overall moderate risk for health complications from their medical conditions.     Follow up:follow up 1 year sooner if needed    Care Plan/Goals: Reviewed N/A   Goals          Patient Stated      Blood Pressure < 140/90 (pt-stated)                 After visit summary printed and given to patient upon discharge.  Patient goals and care plan are included in After visit summary.

## 2023-04-30 DIAGNOSIS — K21.9 GASTROESOPHAGEAL REFLUX DISEASE WITHOUT ESOPHAGITIS: ICD-10-CM

## 2023-04-30 NOTE — TELEPHONE ENCOUNTER
No care due was identified.  [facility] Embedded Care Due Messages. Reference number: [id number].   4/30/2023 10:09:01 AM CDT

## 2023-05-01 RX ORDER — PANTOPRAZOLE SODIUM 40 MG/1
40 TABLET, DELAYED RELEASE ORAL DAILY
Qty: 90 TABLET | Refills: 3 | Status: SHIPPED | OUTPATIENT
Start: 2023-05-01 | End: 2024-02-07 | Stop reason: SDUPTHER

## 2023-05-01 RX ORDER — BNT162B2 ORIGINAL AND OMICRON BA.4/BA.5 .1125; .1125 MG/2.25ML; MG/2.25ML
INJECTION, SUSPENSION INTRAMUSCULAR
COMMUNITY
Start: 2022-11-17 | End: 2023-10-03

## 2023-05-01 RX ORDER — INFLUENZA VIRUS VACCINE 15; 15; 15; 15 UG/.5ML; UG/.5ML; UG/.5ML; UG/.5ML
SUSPENSION INTRAMUSCULAR
COMMUNITY
Start: 2022-11-17 | End: 2023-10-03

## 2023-05-01 NOTE — TELEPHONE ENCOUNTER
Refill Decision Note   Lia Ragsdale  is requesting a refill authorization.  Brief Assessment and Rationale for Refill:  Approve     Medication Therapy Plan:         Comments:     No Care Gaps recommended.     Note composed:10:46 AM 05/01/2023

## 2023-05-12 ENCOUNTER — OFFICE VISIT (OUTPATIENT)
Dept: SURGERY | Facility: CLINIC | Age: 58
End: 2023-05-12
Payer: COMMERCIAL

## 2023-05-12 VITALS
SYSTOLIC BLOOD PRESSURE: 159 MMHG | OXYGEN SATURATION: 95 % | DIASTOLIC BLOOD PRESSURE: 88 MMHG | WEIGHT: 178.13 LBS | BODY MASS INDEX: 30.58 KG/M2 | HEART RATE: 79 BPM

## 2023-05-12 DIAGNOSIS — L29.0 PRURITUS ANI: Primary | ICD-10-CM

## 2023-05-12 DIAGNOSIS — K64.0 GRADE I HEMORRHOIDS: ICD-10-CM

## 2023-05-12 PROCEDURE — 1160F RVW MEDS BY RX/DR IN RCRD: CPT | Mod: CPTII,S$GLB,,

## 2023-05-12 PROCEDURE — 3008F PR BODY MASS INDEX (BMI) DOCUMENTED: ICD-10-PCS | Mod: CPTII,S$GLB,,

## 2023-05-12 PROCEDURE — 99203 OFFICE O/P NEW LOW 30 MIN: CPT | Mod: S$GLB,,,

## 2023-05-12 PROCEDURE — 3079F DIAST BP 80-89 MM HG: CPT | Mod: CPTII,S$GLB,,

## 2023-05-12 PROCEDURE — 99203 PR OFFICE/OUTPT VISIT, NEW, LEVL III, 30-44 MIN: ICD-10-PCS | Mod: S$GLB,,,

## 2023-05-12 PROCEDURE — 3077F PR MOST RECENT SYSTOLIC BLOOD PRESSURE >= 140 MM HG: ICD-10-PCS | Mod: CPTII,S$GLB,,

## 2023-05-12 PROCEDURE — 99999 PR PBB SHADOW E&M-EST. PATIENT-LVL IV: CPT | Mod: PBBFAC,,,

## 2023-05-12 PROCEDURE — 3008F BODY MASS INDEX DOCD: CPT | Mod: CPTII,S$GLB,,

## 2023-05-12 PROCEDURE — 1159F MED LIST DOCD IN RCRD: CPT | Mod: CPTII,S$GLB,,

## 2023-05-12 PROCEDURE — 1159F PR MEDICATION LIST DOCUMENTED IN MEDICAL RECORD: ICD-10-PCS | Mod: CPTII,S$GLB,,

## 2023-05-12 PROCEDURE — 3079F PR MOST RECENT DIASTOLIC BLOOD PRESSURE 80-89 MM HG: ICD-10-PCS | Mod: CPTII,S$GLB,,

## 2023-05-12 PROCEDURE — 3044F PR MOST RECENT HEMOGLOBIN A1C LEVEL <7.0%: ICD-10-PCS | Mod: CPTII,S$GLB,,

## 2023-05-12 PROCEDURE — 1160F PR REVIEW ALL MEDS BY PRESCRIBER/CLIN PHARMACIST DOCUMENTED: ICD-10-PCS | Mod: CPTII,S$GLB,,

## 2023-05-12 PROCEDURE — 3077F SYST BP >= 140 MM HG: CPT | Mod: CPTII,S$GLB,,

## 2023-05-12 PROCEDURE — 99999 PR PBB SHADOW E&M-EST. PATIENT-LVL IV: ICD-10-PCS | Mod: PBBFAC,,,

## 2023-05-12 PROCEDURE — 3044F HG A1C LEVEL LT 7.0%: CPT | Mod: CPTII,S$GLB,,

## 2023-05-12 NOTE — PROGRESS NOTES
History & Physical    SUBJECTIVE:     CC: anal itching  Ref: Romana Rivera MD    History of Present Illness:  Patient is a 58 y.o. female presents with complaints of anal itching. States that it has been itching for months to a year and has just gotten worse. Alleviating symptoms include warm baths, proctofoam, otc steroid cream. Denies any rectal trauma, rectal pain, rectal bleeding, changes in bowels. Does not feel any external tissue. Reports having a daily bowel movement, does not use fiber/stool softeners/laxatives, sits on toilet <5 minutes at a time, does not strain or have hard stools, drinks <64 oz of water daily, uses toilet paper only. Denies use of wet wipes or any perfumes or anything other than soap and water in the bathtub. Father with colon cancer. Last colonoscopy in 2021 with hemorrhoids and diverticulosis. No anticoagulation.    Chief Complaint   Patient presents with    Hemorrhoids       Review of patient's allergies indicates:  No Known Allergies    Current Outpatient Medications   Medication Sig Dispense Refill    amLODIPine (NORVASC) 2.5 MG tablet Take 1 tablet (2.5 mg total) by mouth once daily. 90 tablet 3    cetirizine (ZYRTEC) 10 MG tablet Take 10 mg by mouth daily as needed.       clotrimazole-betamethasone 1-0.05% (LOTRISONE) cream Apply 15 g topically 2 (two) times daily. 15 g 0    FLUARIX QUAD 8067-1040, PF, 60 mcg (15 mcg x 4)/0.5 mL Syrg       hydrocortisone-pramoxine (PROCTOFOAM-HS) rectal foam Place 1 applicator rectally 2 (two) times daily. 10 g 2    pantoprazole (PROTONIX) 40 MG tablet Take 1 tablet (40 mg total) by mouth once daily. 90 tablet 3    PFIZER COVID BIVAL,12Y UP,,PF, 30 mcg/0.3 mL injection        No current facility-administered medications for this visit.       Past Medical History:   Diagnosis Date    Bladder instability 1/13/2015    Depression     History of depression in the past. Pt states it was from a stressful job.    Gallstones     General anesthetics  causing adverse effect in therapeutic use     had respiratory distress after propofol    HPV (human papilloma virus) anogenital infection     Peptic ulcer disease      Past Surgical History:   Procedure Laterality Date     SECTION, LOW TRANSVERSE      x 1    CHOLECYSTECTOMY      COLONOSCOPY N/A 2021    Procedure: COLONOSCOPY;  Surgeon: Rebecca Willoughby MD;  Location: [facility];  Service: Endoscopy;  Laterality: N/A;    TUBAL LIGATION       Family History   Problem Relation Age of Onset    Colon cancer Father     Diabetes Mother     Hypertension Mother     Hypertension Sister     Breast cancer Sister 54        Stage 1     Social History     Tobacco Use    Smoking status: Never    Smokeless tobacco: Never   Substance Use Topics    Alcohol use: Yes     Comment: Socially drinks    Drug use: No        Review of Systems:  Review of Systems   Constitutional:  Negative for activity change, appetite change, chills, diaphoresis, fatigue, fever and unexpected weight change.   Respiratory:  Negative for chest tightness and shortness of breath.    Cardiovascular:  Negative for chest pain.   Gastrointestinal:  Negative for abdominal distention, abdominal pain, anal bleeding, blood in stool, constipation, diarrhea, nausea, rectal pain and vomiting.        +anal itching   Skin:  Negative for color change, rash and wound.     OBJECTIVE:     Vital Signs (Most Recent)  Pulse: 79 (23 1250)  BP: (!) 159/88 (23 1250)  SpO2: 95 % (23 1250)     80.8 kg (178 lb 2.1 oz)     Physical Exam:  Physical Exam  Vitals reviewed. Exam conducted with a chaperone present.   Constitutional:       General: She is not in acute distress.     Appearance: Normal appearance. She is well-developed and normal weight. She is not ill-appearing or toxic-appearing.   HENT:      Head: Normocephalic and atraumatic.      Right Ear: External ear normal.      Left Ear: External ear normal.      Nose: Nose normal.   Cardiovascular:       Rate and Rhythm: Normal rate and regular rhythm.   Pulmonary:      Effort: Pulmonary effort is normal. No respiratory distress.   Abdominal:      General: Abdomen is flat. There is no distension.      Palpations: Abdomen is soft.      Tenderness: There is no abdominal tenderness.   Genitourinary:     Comments: Anorectal: +perirectal irritation. No external masses or lesions. No TTP. LEIAN with good tone, no blood, and no palpable masses or lesions.   Musculoskeletal:         General: Normal range of motion.      Cervical back: Normal range of motion and neck supple.   Skin:     General: Skin is warm and dry.   Neurological:      Mental Status: She is alert and oriented to person, place, and time.   Psychiatric:         Mood and Affect: Mood normal.         Behavior: Behavior normal.     Anoscopy Procedure Note    Pre-procedure diagnosis:  rectal itching    Post-procedure diagnosis:  internal hemorrhoids    Procedure: Anoscopy    Surgeon: Stephen Jerome PA-C    Assistant: Angie Ortiz RN    Specimen: none    Findings: Anoscope inserted and all 4 quadrants examined. Mildly enlarged left lateral, right anterior, right posterior internal hemorrhoids without evidence of recent bleeding. No other internal masses or lesions visualized.     Patient tolerated procedure well.        Diagnostic Results:  Colonoscopy 2021 Reviewed    ASSESSMENT/PLAN:     58 y.o. with pruritus ani    - Discussed pruritis precautions  - Use zinc-oxide based barrier cream daily  - Avoid excessive trauma when cleaning after BM's - pat dry or use a hair dryer to dry the anal area, if necessary  - Avoid excessive moisture - use absorbent cotton underwear and may use cotton balls of corn starch if itch persists  - Avoid external irritants such as deodorized or perfumed soaps  - Discussed possible dietary role (coffee, chocolate, alcohol, citrus, etc)   - Sitz baths prn  - Discussed use of steroid cream no longer than 2 weeks at a time without a 2  week holiday  - If no improvement, can use Athlete's foot powder (Miconazole) 2x/day for 10 days or Critic-Aid AF ointment 2x/day for 10 days.  -Discussed that a minimum I would recommend behavioral, lifestyle and medication modifications to improve bowel habits. Usual bowel management handout given to patient. This includes a stool softener twice per day, fiber powder supplementation daily, drinking at least 64oz of water/day, avoiding straining with bowel movements, spending less than 5 min on toilet per bowel movement, eating a high fiber diet, using miralax as needed to achieve a bowel movement daily and using wet wipes to wipe after bowel movements when irritated.   - RTC prn. If symptoms not improved within 6-8 weeks, patient to call use to get an appointment with MD for possible biopsy.     Stephen Jerome PA-C  Colon and Rectal Surgery  [facility]

## 2023-05-12 NOTE — PATIENT INSTRUCTIONS
For Anal itching:  Make sure to keep anal area clean and dry. Tight-fitting clothing should be avoided as these trap moisture in the anal area. Using a skin barrier such as Zinc Oxide - can be bought over the counter at the pharmacy - daily. Hydrocortisone 1% cream may be used twice daily be used in conjunction with the barrier cream. However, topical hydrocortisone should not be used for more than two weeks to avoid skin atrophy.      If no improvement, Athlete's foot powder (Miconazole) 2x/day for 10 days or Critic-Aid AF ointment 2x/day for 10 days.

## 2023-06-20 DIAGNOSIS — I10 ESSENTIAL HYPERTENSION: ICD-10-CM

## 2023-06-20 NOTE — TELEPHONE ENCOUNTER
Refill Routing Note   Medication(s) are not appropriate for processing by [facility] for the following reason(s):      Required vitals abnormal: 159/88    [facility] action(s):  Defer Care Due:  None identified          Appointments  past 12m or future 3m with PCP    Date Provider   Last Visit   4/14/2023 Romana Rivera MD   Next Visit   Visit date not found Romana Rivera MD   ED visits in past 90 days: 0        Note composed:6:06 PM 06/20/2023

## 2023-06-20 NOTE — TELEPHONE ENCOUNTER
No care due was identified.  [facility] Embedded Care Due Messages. Reference number: [id number].   6/20/2023 3:19:32 PM CDT

## 2023-06-21 RX ORDER — AMLODIPINE BESYLATE 2.5 MG/1
2.5 TABLET ORAL DAILY
Qty: 90 TABLET | Refills: 0 | Status: SHIPPED | OUTPATIENT
Start: 2023-06-21 | End: 2023-09-27 | Stop reason: SDUPTHER

## 2023-06-27 ENCOUNTER — PATIENT MESSAGE (OUTPATIENT)
Dept: INTERNAL MEDICINE | Facility: CLINIC | Age: 58
End: 2023-06-27
Payer: COMMERCIAL

## 2023-06-27 DIAGNOSIS — Z86.19 H/O COLD SORES: ICD-10-CM

## 2023-06-28 RX ORDER — VALACYCLOVIR HYDROCHLORIDE 1 G/1
TABLET, FILM COATED ORAL
Qty: 4 TABLET | Refills: 1 | OUTPATIENT
Start: 2023-06-28

## 2023-06-28 NOTE — TELEPHONE ENCOUNTER
No care due was identified.  [facility] Embedded Care Due Messages. Reference number: [id number].   6/27/2023 9:58:37 PM CDT

## 2023-06-28 NOTE — TELEPHONE ENCOUNTER
Refill Decision Note   Lia Ragsdale  is requesting a refill authorization.  Brief Assessment and Rationale for Refill:  Quick Discontinue     Medication Therapy Plan:  D/C 4/14/23      Comments:     Note composed:3:43 AM 06/28/2023

## 2023-06-29 RX ORDER — VALACYCLOVIR HYDROCHLORIDE 1 G/1
1000 TABLET, FILM COATED ORAL 2 TIMES DAILY
Qty: 60 TABLET | Refills: 0 | Status: SHIPPED | OUTPATIENT
Start: 2023-06-29 | End: 2024-06-28

## 2023-09-20 ENCOUNTER — OFFICE VISIT (OUTPATIENT)
Dept: OBSTETRICS AND GYNECOLOGY | Facility: CLINIC | Age: 58
End: 2023-09-20
Payer: COMMERCIAL

## 2023-09-20 VITALS
HEIGHT: 64 IN | DIASTOLIC BLOOD PRESSURE: 72 MMHG | SYSTOLIC BLOOD PRESSURE: 132 MMHG | WEIGHT: 180.13 LBS | BODY MASS INDEX: 30.75 KG/M2

## 2023-09-20 DIAGNOSIS — Z01.419 ENCOUNTER FOR GYNECOLOGICAL EXAMINATION WITHOUT ABNORMAL FINDING: Primary | ICD-10-CM

## 2023-09-20 DIAGNOSIS — N95.2 VAGINAL ATROPHY: ICD-10-CM

## 2023-09-20 DIAGNOSIS — Z78.0 MENOPAUSE: ICD-10-CM

## 2023-09-20 DIAGNOSIS — Z12.31 ENCOUNTER FOR SCREENING MAMMOGRAM FOR MALIGNANT NEOPLASM OF BREAST: ICD-10-CM

## 2023-09-20 PROCEDURE — 3075F PR MOST RECENT SYSTOLIC BLOOD PRESS GE 130-139MM HG: ICD-10-PCS | Mod: CPTII,S$GLB,, | Performed by: OBSTETRICS & GYNECOLOGY

## 2023-09-20 PROCEDURE — 3075F SYST BP GE 130 - 139MM HG: CPT | Mod: CPTII,S$GLB,, | Performed by: OBSTETRICS & GYNECOLOGY

## 2023-09-20 PROCEDURE — 3044F PR MOST RECENT HEMOGLOBIN A1C LEVEL <7.0%: ICD-10-PCS | Mod: CPTII,S$GLB,, | Performed by: OBSTETRICS & GYNECOLOGY

## 2023-09-20 PROCEDURE — 3008F BODY MASS INDEX DOCD: CPT | Mod: CPTII,S$GLB,, | Performed by: OBSTETRICS & GYNECOLOGY

## 2023-09-20 PROCEDURE — 99396 PR PREVENTIVE VISIT,EST,40-64: ICD-10-PCS | Mod: S$GLB,,, | Performed by: OBSTETRICS & GYNECOLOGY

## 2023-09-20 PROCEDURE — 99396 PREV VISIT EST AGE 40-64: CPT | Mod: S$GLB,,, | Performed by: OBSTETRICS & GYNECOLOGY

## 2023-09-20 PROCEDURE — 1159F PR MEDICATION LIST DOCUMENTED IN MEDICAL RECORD: ICD-10-PCS | Mod: CPTII,S$GLB,, | Performed by: OBSTETRICS & GYNECOLOGY

## 2023-09-20 PROCEDURE — 1159F MED LIST DOCD IN RCRD: CPT | Mod: CPTII,S$GLB,, | Performed by: OBSTETRICS & GYNECOLOGY

## 2023-09-20 PROCEDURE — 3008F PR BODY MASS INDEX (BMI) DOCUMENTED: ICD-10-PCS | Mod: CPTII,S$GLB,, | Performed by: OBSTETRICS & GYNECOLOGY

## 2023-09-20 PROCEDURE — 3044F HG A1C LEVEL LT 7.0%: CPT | Mod: CPTII,S$GLB,, | Performed by: OBSTETRICS & GYNECOLOGY

## 2023-09-20 PROCEDURE — 99999 PR PBB SHADOW E&M-EST. PATIENT-LVL III: CPT | Mod: PBBFAC,,, | Performed by: OBSTETRICS & GYNECOLOGY

## 2023-09-20 PROCEDURE — 3078F DIAST BP <80 MM HG: CPT | Mod: CPTII,S$GLB,, | Performed by: OBSTETRICS & GYNECOLOGY

## 2023-09-20 PROCEDURE — 99999 PR PBB SHADOW E&M-EST. PATIENT-LVL III: ICD-10-PCS | Mod: PBBFAC,,, | Performed by: OBSTETRICS & GYNECOLOGY

## 2023-09-20 PROCEDURE — 3078F PR MOST RECENT DIASTOLIC BLOOD PRESSURE < 80 MM HG: ICD-10-PCS | Mod: CPTII,S$GLB,, | Performed by: OBSTETRICS & GYNECOLOGY

## 2023-09-20 RX ORDER — ESTRADIOL 10 UG/1
1 INSERT VAGINAL
Qty: 12 TABLET | Refills: 11 | Status: SHIPPED | OUTPATIENT
Start: 2023-09-20 | End: 2023-10-03

## 2023-09-20 NOTE — PROGRESS NOTES
"  Subjective:       Patient ID: Lia Ragsdale is a 58 y.o. female.    Chief Complaint:  Well Woman      History of Present Illness  HPI  Patient presents to day for annual exam , postmenopausal.   Having some vaginal atrophy symptoms  no vaginal bleeding or pelvic pain noted.   Hormonal therapy reviewed and discussed. On none     Preventive screening exam indication and testing reviewed and discussed.    Health Maintenance   Topic Date Due    Hepatitis C Screening  Never done    Shingles Vaccine (1 of 2) Never done    Mammogram  2023    Colorectal Cancer Screening  2026    TETANUS VACCINE  2027    Lipid Panel  2028     GYN & OB History  Patient's last menstrual period was 2016.   Date of Last Pap: 2021    OB History    Para Term  AB Living   1 1 1     1   SAB IAB Ectopic Multiple Live Births                  # Outcome Date GA Lbr Lokesh/2nd Weight Sex Delivery Anes PTL Lv   1 Term      CS-Unspec          Review of Systems  Review of Systems        Objective:   /72   Ht 5' 4" (1.626 m)   Wt 81.7 kg (180 lb 1.9 oz)   LMP 2016   BMI 30.92 kg/m²    Physical Exam:   Constitutional: She appears well-developed and well-nourished. No distress.      Neck: No JVD present. No thyroid mass and no thyromegaly present.    Cardiovascular:  Normal rate and regular rhythm.                  Abdominal: Soft. Bowel sounds are normal. No hernia. Hernia confirmed negative in the ventral area, confirmed negative in the right inguinal area and confirmed negative in the left inguinal area.     Genitourinary:    Vagina, uterus and rectum normal.   The external female genitalia was normal.   Labial bartholins normal.There is no rash, tenderness, lesion or injury on the right labia. There is no rash, tenderness, lesion or injury on the left labia. Cervix is normal. Right adnexum displays no mass, no tenderness and no fullness. Left adnexum displays no mass, no tenderness and no " fullness. No erythema,  no vaginal discharge, tenderness or bleeding in the vagina.    No foreign body in the vagina.   Cervix exhibits no motion tenderness, no discharge and no friability. Uterus is not deviated, not enlarged, not fixed and not tender. Normal urethral meatus.Urethra findings: no tendernessBladder findings: no bladder tenderness                     Assessment:        1. Encounter for gynecological examination without abnormal finding    2. Encounter for screening mammogram for malignant neoplasm of breast    3. Vaginal atrophy    4. Menopause                Plan:            Lia was seen today for well woman.    Diagnoses and all orders for this visit:    Encounter for gynecological examination without abnormal finding    Encounter for screening mammogram for malignant neoplasm of breast  -     Mammo Digital Screening Bilat w/ Tony; Future    Vaginal atrophy  -     estradioL (VAGIFEM) 10 mcg Tab; Place 1 tablet (10 mcg total) vaginally 3 (three) times a week.    Menopause

## 2023-09-26 ENCOUNTER — PATIENT MESSAGE (OUTPATIENT)
Dept: INTERNAL MEDICINE | Facility: CLINIC | Age: 58
End: 2023-09-26
Payer: COMMERCIAL

## 2023-09-26 DIAGNOSIS — I10 ESSENTIAL HYPERTENSION: ICD-10-CM

## 2023-09-27 RX ORDER — AMLODIPINE BESYLATE 2.5 MG/1
2.5 TABLET ORAL DAILY
Qty: 30 TABLET | Refills: 0 | Status: SHIPPED | OUTPATIENT
Start: 2023-09-27 | End: 2023-10-03 | Stop reason: SDUPTHER

## 2023-09-28 DIAGNOSIS — I10 ESSENTIAL HYPERTENSION: ICD-10-CM

## 2023-09-28 RX ORDER — AMLODIPINE BESYLATE 2.5 MG/1
2.5 TABLET ORAL DAILY
Qty: 90 TABLET | Refills: 0 | OUTPATIENT
Start: 2023-09-28

## 2023-09-28 NOTE — TELEPHONE ENCOUNTER
Refill Routing Note   Medication(s) are not appropriate for processing by [facility] for the following reason(s):      Non-participating provider    [facility] action(s):  Route Care Due:  None identified            Appointments  past 12m or future 3m with PCP    Date Provider   Last Visit   4/14/2023 Romana Rivera MD   Next Visit   Visit date not found Romana Rivera MD   ED visits in past 90 days: 0        Note composed:9:19 AM 09/28/2023

## 2023-10-03 ENCOUNTER — OFFICE VISIT (OUTPATIENT)
Dept: INTERNAL MEDICINE | Facility: CLINIC | Age: 58
End: 2023-10-03
Payer: COMMERCIAL

## 2023-10-03 VITALS
HEART RATE: 70 BPM | TEMPERATURE: 98 F | BODY MASS INDEX: 30.68 KG/M2 | DIASTOLIC BLOOD PRESSURE: 76 MMHG | SYSTOLIC BLOOD PRESSURE: 124 MMHG | WEIGHT: 179.69 LBS | HEIGHT: 64 IN | RESPIRATION RATE: 19 BRPM | OXYGEN SATURATION: 100 %

## 2023-10-03 DIAGNOSIS — I10 ESSENTIAL HYPERTENSION: Primary | ICD-10-CM

## 2023-10-03 DIAGNOSIS — R73.03 PREDIABETES: ICD-10-CM

## 2023-10-03 DIAGNOSIS — L30.9 ECZEMA, UNSPECIFIED TYPE: ICD-10-CM

## 2023-10-03 PROCEDURE — 1160F RVW MEDS BY RX/DR IN RCRD: CPT | Mod: CPTII,S$GLB,, | Performed by: PHYSICIAN ASSISTANT

## 2023-10-03 PROCEDURE — 90471 FLU VACCINE (QUAD) GREATER THAN OR EQUAL TO 3YO PRESERVATIVE FREE IM: ICD-10-PCS | Mod: S$GLB,,, | Performed by: PHYSICIAN ASSISTANT

## 2023-10-03 PROCEDURE — 90686 FLU VACCINE (QUAD) GREATER THAN OR EQUAL TO 3YO PRESERVATIVE FREE IM: ICD-10-PCS | Mod: S$GLB,,, | Performed by: PHYSICIAN ASSISTANT

## 2023-10-03 PROCEDURE — 3074F PR MOST RECENT SYSTOLIC BLOOD PRESSURE < 130 MM HG: ICD-10-PCS | Mod: CPTII,S$GLB,, | Performed by: PHYSICIAN ASSISTANT

## 2023-10-03 PROCEDURE — 90471 IMMUNIZATION ADMIN: CPT | Mod: S$GLB,,, | Performed by: PHYSICIAN ASSISTANT

## 2023-10-03 PROCEDURE — 3078F PR MOST RECENT DIASTOLIC BLOOD PRESSURE < 80 MM HG: ICD-10-PCS | Mod: CPTII,S$GLB,, | Performed by: PHYSICIAN ASSISTANT

## 2023-10-03 PROCEDURE — 99999 PR PBB SHADOW E&M-EST. PATIENT-LVL IV: ICD-10-PCS | Mod: PBBFAC,,, | Performed by: PHYSICIAN ASSISTANT

## 2023-10-03 PROCEDURE — 3044F PR MOST RECENT HEMOGLOBIN A1C LEVEL <7.0%: ICD-10-PCS | Mod: CPTII,S$GLB,, | Performed by: PHYSICIAN ASSISTANT

## 2023-10-03 PROCEDURE — 3008F PR BODY MASS INDEX (BMI) DOCUMENTED: ICD-10-PCS | Mod: CPTII,S$GLB,, | Performed by: PHYSICIAN ASSISTANT

## 2023-10-03 PROCEDURE — 90686 IIV4 VACC NO PRSV 0.5 ML IM: CPT | Mod: S$GLB,,, | Performed by: PHYSICIAN ASSISTANT

## 2023-10-03 PROCEDURE — 3074F SYST BP LT 130 MM HG: CPT | Mod: CPTII,S$GLB,, | Performed by: PHYSICIAN ASSISTANT

## 2023-10-03 PROCEDURE — 3078F DIAST BP <80 MM HG: CPT | Mod: CPTII,S$GLB,, | Performed by: PHYSICIAN ASSISTANT

## 2023-10-03 PROCEDURE — 99999 PR PBB SHADOW E&M-EST. PATIENT-LVL IV: CPT | Mod: PBBFAC,,, | Performed by: PHYSICIAN ASSISTANT

## 2023-10-03 PROCEDURE — 1159F MED LIST DOCD IN RCRD: CPT | Mod: CPTII,S$GLB,, | Performed by: PHYSICIAN ASSISTANT

## 2023-10-03 PROCEDURE — 99214 OFFICE O/P EST MOD 30 MIN: CPT | Mod: 25,S$GLB,, | Performed by: PHYSICIAN ASSISTANT

## 2023-10-03 PROCEDURE — 1159F PR MEDICATION LIST DOCUMENTED IN MEDICAL RECORD: ICD-10-PCS | Mod: CPTII,S$GLB,, | Performed by: PHYSICIAN ASSISTANT

## 2023-10-03 PROCEDURE — 3044F HG A1C LEVEL LT 7.0%: CPT | Mod: CPTII,S$GLB,, | Performed by: PHYSICIAN ASSISTANT

## 2023-10-03 PROCEDURE — 1160F PR REVIEW ALL MEDS BY PRESCRIBER/CLIN PHARMACIST DOCUMENTED: ICD-10-PCS | Mod: CPTII,S$GLB,, | Performed by: PHYSICIAN ASSISTANT

## 2023-10-03 PROCEDURE — 3008F BODY MASS INDEX DOCD: CPT | Mod: CPTII,S$GLB,, | Performed by: PHYSICIAN ASSISTANT

## 2023-10-03 PROCEDURE — 99214 PR OFFICE/OUTPT VISIT, EST, LEVL IV, 30-39 MIN: ICD-10-PCS | Mod: 25,S$GLB,, | Performed by: PHYSICIAN ASSISTANT

## 2023-10-03 RX ORDER — AMLODIPINE BESYLATE 2.5 MG/1
2.5 TABLET ORAL DAILY
Qty: 90 TABLET | Refills: 1 | Status: SHIPPED | OUTPATIENT
Start: 2023-10-03 | End: 2023-10-30 | Stop reason: SDUPTHER

## 2023-10-03 RX ORDER — TRIAMCINOLONE ACETONIDE 1 MG/G
CREAM TOPICAL 2 TIMES DAILY
Qty: 45 G | Refills: 0 | Status: SHIPPED | OUTPATIENT
Start: 2023-10-03

## 2023-10-03 NOTE — PROGRESS NOTES
"Subjective:      Patient ID: Lia Ragsdale is a 58 y.o. female.    Chief Complaint: Medication Refill    Patient is known to me, being seen today for medication refills.     HTN- amlodipine 2.5mg     Requests refill on lotrisone for eczema rash to elbows     Due for labs     Last visit April 2023 with Dr. Rivera.  Needs to establish with new PCP, appt with Dr. Christensen in Jan.       Review of Systems   Constitutional:  Positive for unexpected weight change. Negative for activity change.   HENT:  Negative for hearing loss, rhinorrhea and trouble swallowing.    Eyes:  Negative for discharge and visual disturbance.   Respiratory:  Negative for chest tightness and wheezing.    Cardiovascular:  Negative for chest pain and palpitations.   Gastrointestinal:  Negative for blood in stool, constipation, diarrhea and vomiting.   Endocrine: Negative for polydipsia and polyuria.   Genitourinary:  Negative for difficulty urinating, dysuria, hematuria and menstrual problem.   Musculoskeletal:  Negative for arthralgias, joint swelling and neck pain.   Neurological:  Negative for weakness and headaches.   Psychiatric/Behavioral:  Negative for confusion and dysphoric mood.        Objective:   /76   Pulse 70   Temp 97.7 °F (36.5 °C)   Resp 19   Ht 5' 4" (1.626 m)   Wt 81.5 kg (179 lb 10.8 oz)   LMP 07/18/2016   SpO2 100%   BMI 30.84 kg/m²   Physical Exam  Constitutional:       General: She is not in acute distress.     Appearance: Normal appearance. She is well-developed. She is not ill-appearing.   HENT:      Head: Normocephalic and atraumatic.   Cardiovascular:      Rate and Rhythm: Normal rate and regular rhythm.      Heart sounds: Normal heart sounds. No murmur heard.  Pulmonary:      Effort: Pulmonary effort is normal. No respiratory distress.      Breath sounds: Normal breath sounds. No decreased breath sounds.   Musculoskeletal:      Right lower leg: No edema.      Left lower leg: No edema.   Skin:     General: " Skin is warm and dry.      Findings: No rash.   Psychiatric:         Speech: Speech normal.         Behavior: Behavior normal.         Thought Content: Thought content normal.       Assessment:      1. Essential hypertension    2. Prediabetes    3. Eczema, unspecified type       Plan:   Essential hypertension  -     CBC Auto Differential; Future; Expected date: 10/03/2023  -     Comprehensive Metabolic Panel; Future; Expected date: 10/03/2023  -     Lipid Panel; Future; Expected date: 10/03/2023  -     amLODIPine (NORVASC) 2.5 MG tablet; Take 1 tablet (2.5 mg total) by mouth once daily.  Dispense: 90 tablet; Refill: 1    Prediabetes  -     Hemoglobin A1C; Future; Expected date: 10/03/2023    Eczema, unspecified type  -     triamcinolone acetonide 0.1% (KENALOG) 0.1 % cream; Apply topically 2 (two) times daily.  Dispense: 45 g; Refill: 0    Other orders  -     Influenza - Quadrivalent (PF)      Fasting labs     Keep f/u Dr. Christensen in Jan to establish care

## 2023-10-09 ENCOUNTER — LAB VISIT (OUTPATIENT)
Dept: LAB | Facility: HOSPITAL | Age: 58
End: 2023-10-09
Attending: PHYSICIAN ASSISTANT
Payer: COMMERCIAL

## 2023-10-09 DIAGNOSIS — I10 ESSENTIAL HYPERTENSION: ICD-10-CM

## 2023-10-09 DIAGNOSIS — R73.03 PREDIABETES: ICD-10-CM

## 2023-10-09 LAB
ALBUMIN SERPL BCP-MCNC: 3.8 G/DL (ref 3.5–5.2)
ALP SERPL-CCNC: 64 U/L (ref 55–135)
ALT SERPL W/O P-5'-P-CCNC: 14 U/L (ref 10–44)
ANION GAP SERPL CALC-SCNC: 10 MMOL/L (ref 8–16)
AST SERPL-CCNC: 17 U/L (ref 10–40)
BASOPHILS # BLD AUTO: 0.06 K/UL (ref 0–0.2)
BASOPHILS NFR BLD: 1.1 % (ref 0–1.9)
BILIRUB SERPL-MCNC: 0.5 MG/DL (ref 0.1–1)
BUN SERPL-MCNC: 7 MG/DL (ref 6–20)
CALCIUM SERPL-MCNC: 9.3 MG/DL (ref 8.7–10.5)
CHLORIDE SERPL-SCNC: 106 MMOL/L (ref 95–110)
CHOLEST SERPL-MCNC: 223 MG/DL (ref 120–199)
CHOLEST/HDLC SERPL: 4.6 {RATIO} (ref 2–5)
CO2 SERPL-SCNC: 27 MMOL/L (ref 23–29)
CREAT SERPL-MCNC: 0.7 MG/DL (ref 0.5–1.4)
DIFFERENTIAL METHOD: ABNORMAL
EOSINOPHIL # BLD AUTO: 0.1 K/UL (ref 0–0.5)
EOSINOPHIL NFR BLD: 2.3 % (ref 0–8)
ERYTHROCYTE [DISTWIDTH] IN BLOOD BY AUTOMATED COUNT: 12.6 % (ref 11.5–14.5)
EST. GFR  (NO RACE VARIABLE): >60 ML/MIN/1.73 M^2
ESTIMATED AVG GLUCOSE: 108 MG/DL (ref 68–131)
GLUCOSE SERPL-MCNC: 102 MG/DL (ref 70–110)
HBA1C MFR BLD: 5.4 % (ref 4–5.6)
HCT VFR BLD AUTO: 44.4 % (ref 37–48.5)
HDLC SERPL-MCNC: 48 MG/DL (ref 40–75)
HDLC SERPL: 21.5 % (ref 20–50)
HGB BLD-MCNC: 14 G/DL (ref 12–16)
IMM GRANULOCYTES # BLD AUTO: 0.01 K/UL (ref 0–0.04)
IMM GRANULOCYTES NFR BLD AUTO: 0.2 % (ref 0–0.5)
LDLC SERPL CALC-MCNC: 156.6 MG/DL (ref 63–159)
LYMPHOCYTES # BLD AUTO: 1.6 K/UL (ref 1–4.8)
LYMPHOCYTES NFR BLD: 27.6 % (ref 18–48)
MCH RBC QN AUTO: 29.1 PG (ref 27–31)
MCHC RBC AUTO-ENTMCNC: 31.5 G/DL (ref 32–36)
MCV RBC AUTO: 92 FL (ref 82–98)
MONOCYTES # BLD AUTO: 0.5 K/UL (ref 0.3–1)
MONOCYTES NFR BLD: 8.6 % (ref 4–15)
NEUTROPHILS # BLD AUTO: 3.4 K/UL (ref 1.8–7.7)
NEUTROPHILS NFR BLD: 60.2 % (ref 38–73)
NONHDLC SERPL-MCNC: 175 MG/DL
NRBC BLD-RTO: 0 /100 WBC
PLATELET # BLD AUTO: 215 K/UL (ref 150–450)
PMV BLD AUTO: 11.2 FL (ref 9.2–12.9)
POTASSIUM SERPL-SCNC: 3.9 MMOL/L (ref 3.5–5.1)
PROT SERPL-MCNC: 7.3 G/DL (ref 6–8.4)
RBC # BLD AUTO: 4.81 M/UL (ref 4–5.4)
SODIUM SERPL-SCNC: 143 MMOL/L (ref 136–145)
TRIGL SERPL-MCNC: 92 MG/DL (ref 30–150)
WBC # BLD AUTO: 5.68 K/UL (ref 3.9–12.7)

## 2023-10-09 PROCEDURE — 85025 COMPLETE CBC W/AUTO DIFF WBC: CPT | Performed by: PHYSICIAN ASSISTANT

## 2023-10-09 PROCEDURE — 80061 LIPID PANEL: CPT | Performed by: PHYSICIAN ASSISTANT

## 2023-10-09 PROCEDURE — 83036 HEMOGLOBIN GLYCOSYLATED A1C: CPT | Performed by: PHYSICIAN ASSISTANT

## 2023-10-09 PROCEDURE — 80053 COMPREHEN METABOLIC PANEL: CPT | Performed by: PHYSICIAN ASSISTANT

## 2023-10-09 PROCEDURE — 36415 COLL VENOUS BLD VENIPUNCTURE: CPT | Mod: PO | Performed by: PHYSICIAN ASSISTANT

## 2023-10-13 ENCOUNTER — HOSPITAL ENCOUNTER (OUTPATIENT)
Dept: RADIOLOGY | Facility: HOSPITAL | Age: 58
Discharge: HOME | End: 2023-10-13
Attending: OBSTETRICS & GYNECOLOGY
Payer: COMMERCIAL

## 2023-10-13 DIAGNOSIS — Z12.31 ENCOUNTER FOR SCREENING MAMMOGRAM FOR MALIGNANT NEOPLASM OF BREAST: ICD-10-CM

## 2023-10-13 PROCEDURE — 77063 BREAST TOMOSYNTHESIS BI: CPT | Mod: 26,,, | Performed by: RADIOLOGY

## 2023-10-13 PROCEDURE — 77067 SCR MAMMO BI INCL CAD: CPT | Mod: TC

## 2023-10-13 PROCEDURE — 77067 SCR MAMMO BI INCL CAD: CPT | Mod: 26,,, | Performed by: RADIOLOGY

## 2023-10-13 PROCEDURE — 77063 MAMMO DIGITAL SCREENING BILAT WITH TOMO: ICD-10-PCS | Mod: 26,,, | Performed by: RADIOLOGY

## 2023-10-13 PROCEDURE — 77067 MAMMO DIGITAL SCREENING BILAT WITH TOMO: ICD-10-PCS | Mod: 26,,, | Performed by: RADIOLOGY

## 2023-10-25 NOTE — LETTER
March 8, 2022      [facility]  [id number] [place] [id number]  Phone: [phone]  Fax: [phone]       Patient: Lia Ragsdale   YOB: 1965  Date of Visit: 03/08/2022    To Whom It May Concern:    Suzette Ragsdale  was at [facility] on 03/08/2022. The patient may return to work/school on 3/9/22 with no restrictions. If you have any questions or concerns, or if I can be of further assistance, please do not hesitate to contact me.    Sincerely,    Jaci Perdue PA-C.      Awake/Alert

## 2023-10-30 DIAGNOSIS — I10 ESSENTIAL HYPERTENSION: ICD-10-CM

## 2023-10-31 RX ORDER — AMLODIPINE BESYLATE 2.5 MG/1
2.5 TABLET ORAL DAILY
Qty: 90 TABLET | Refills: 1 | Status: SHIPPED | OUTPATIENT
Start: 2023-10-31 | End: 2024-02-07 | Stop reason: SDUPTHER

## 2023-10-31 NOTE — TELEPHONE ENCOUNTER
Can you please re-send this script. They claim that it is not on file.     Please see the attached refill request.

## 2024-01-05 ENCOUNTER — HOSPITAL ENCOUNTER (OUTPATIENT)
Dept: RADIOLOGY | Facility: HOSPITAL | Age: 59
Discharge: HOME | End: 2024-01-05
Payer: COMMERCIAL

## 2024-01-05 ENCOUNTER — OFFICE VISIT (OUTPATIENT)
Dept: INTERNAL MEDICINE | Facility: CLINIC | Age: 59
End: 2024-01-05
Payer: COMMERCIAL

## 2024-01-05 VITALS
BODY MASS INDEX: 30.75 KG/M2 | HEART RATE: 68 BPM | SYSTOLIC BLOOD PRESSURE: 130 MMHG | WEIGHT: 180.13 LBS | OXYGEN SATURATION: 96 % | HEIGHT: 64 IN | TEMPERATURE: 98 F | DIASTOLIC BLOOD PRESSURE: 74 MMHG

## 2024-01-05 DIAGNOSIS — M79.671 PAIN OF RIGHT HEEL: ICD-10-CM

## 2024-01-05 DIAGNOSIS — E66.9 OBESITY (BMI 30-39.9): ICD-10-CM

## 2024-01-05 DIAGNOSIS — M79.671 PAIN OF RIGHT HEEL: Primary | ICD-10-CM

## 2024-01-05 DIAGNOSIS — I10 ESSENTIAL HYPERTENSION: ICD-10-CM

## 2024-01-05 PROCEDURE — 99999 PR PBB SHADOW E&M-EST. PATIENT-LVL V: CPT | Mod: PBBFAC,,,

## 2024-01-05 PROCEDURE — 73630 X-RAY EXAM OF FOOT: CPT | Mod: 26,RT,, | Performed by: RADIOLOGY

## 2024-01-05 PROCEDURE — 1160F RVW MEDS BY RX/DR IN RCRD: CPT | Mod: CPTII,S$GLB,,

## 2024-01-05 PROCEDURE — 99214 OFFICE O/P EST MOD 30 MIN: CPT | Mod: S$GLB,,,

## 2024-01-05 PROCEDURE — 3078F DIAST BP <80 MM HG: CPT | Mod: CPTII,S$GLB,,

## 2024-01-05 PROCEDURE — 3008F BODY MASS INDEX DOCD: CPT | Mod: CPTII,S$GLB,,

## 2024-01-05 PROCEDURE — 73630 X-RAY EXAM OF FOOT: CPT | Mod: TC,RT

## 2024-01-05 PROCEDURE — 3075F SYST BP GE 130 - 139MM HG: CPT | Mod: CPTII,S$GLB,,

## 2024-01-05 PROCEDURE — 1159F MED LIST DOCD IN RCRD: CPT | Mod: CPTII,S$GLB,,

## 2024-01-05 NOTE — PROGRESS NOTES
Lia TONY Jn  2024  [id number]    Rebecca Christensen DO  Patient Care Team:  Rebceca Christensen DO as PCP - General (Internal Medicine)  Aby Delgado LPN as Care Coordinator (Internal Medicine)  Jaci Perdue PA-C as Physician Assistant (Family Medicine)          Visit Type:an urgent visit for a new problem    Chief Complaint:  Chief Complaint   Patient presents with    Heel Pain     right       History of Present Illness:    Having right heel pain   Describes it as sharp and stabbing   Pain started at end of Nov  Pain with ambulation  Has not noticed swelling or erythema in heel         History:  Past Medical History:   Diagnosis Date    Bladder instability 2015    Depression     History of depression in the past. Pt states it was from a stressful job.    Gallstones     General anesthetics causing adverse effect in therapeutic use     had respiratory distress after propofol    HPV (human papilloma virus) anogenital infection     Peptic ulcer disease      Past Surgical History:   Procedure Laterality Date     SECTION, LOW TRANSVERSE      x 1    CHOLECYSTECTOMY      COLONOSCOPY N/A 2021    Procedure: COLONOSCOPY;  Surgeon: Rebecca Willoughby MD;  Location: [facility];  Service: Endoscopy;  Laterality: N/A;    TUBAL LIGATION       Family History   Problem Relation Age of Onset    Colon cancer Father     Arthritis Father     Diabetes Mother     Hypertension Mother     Arthritis Mother     Hypertension Sister     Breast cancer Sister 54        Stage 1    Cancer Sister      Social History     Socioeconomic History    Marital status:    Tobacco Use    Smoking status: Never    Smokeless tobacco: Never   Substance and Sexual Activity    Alcohol use: Yes     Alcohol/week: 6.0 standard drinks of alcohol     Types: 6 Cans of beer per week     Comment: Socially drinks    Drug use: No    Sexual activity: Yes     Partners: Male     Birth control/protection: None     Comment: Tubes  Tied     Social Determinants of Health     Financial Resource Strain: Low Risk  (12/9/2023)    Overall Financial Resource Strain (CARDIA)     Difficulty of Paying Living Expenses: Not hard at all   Food Insecurity: No Food Insecurity (12/9/2023)    Hunger Vital Sign     Worried About Running Out of Food in the Last Year: Never true     Ran Out of Food in the Last Year: Never true   Transportation Needs: No Transportation Needs (12/9/2023)    PRAPARE - Transportation     Lack of Transportation (Medical): No     Lack of Transportation (Non-Medical): No   Physical Activity: Inactive (12/9/2023)    Exercise Vital Sign     Days of Exercise per Week: 3 days     Minutes of Exercise per Session: 0 min   Stress: Stress Concern Present (12/9/2023)    [employer] - Occupational Stress Questionnaire     Feeling of Stress : Very much   Social Connections: Unknown (12/9/2023)    Social Connection and Isolation Panel [NHANES]     Frequency of Communication with Friends and Family: Three times a week     Frequency of Social Gatherings with Friends and Family: Never     Active Member of Clubs or Organizations: No     Attends Club or Organization Meetings: Never     Marital Status:    Housing Stability: Low Risk  (12/9/2023)    Housing Stability Vital Sign     Unable to Pay for Housing in the Last Year: No     Number of Places Lived in the Last Year: 1     Unstable Housing in the Last Year: No     Patient Active Problem List   Diagnosis    Bladder instability    Essential hypertension    Family history of colon cancer in father    Statin intolerance     Review of patient's allergies indicates:  No Known Allergies    The following were reviewed at this visit: active problem list, medication list, allergies, family history, social history, and health maintenance.    Medications:  Current Outpatient Medications on File Prior to Visit   Medication Sig Dispense Refill    amLODIPine (NORVASC) 2.5 MG  tablet Take 1 tablet (2.5 mg total) by mouth once daily. 90 tablet 1    cetirizine (ZYRTEC) 10 MG tablet Take 10 mg by mouth daily as needed.       pantoprazole (PROTONIX) 40 MG tablet Take 1 tablet (40 mg total) by mouth once daily. 90 tablet 3    triamcinolone acetonide 0.1% (KENALOG) 0.1 % cream Apply topically 2 (two) times daily. 45 g 0    valACYclovir (VALTREX) 1000 MG tablet Take 1 tablet (1,000 mg total) by mouth 2 (two) times daily. 60 tablet 0     No current facility-administered medications on file prior to visit.       Medications have been reviewed and reconciled with patient at this visit.  Barriers to medications reviewed with patient.    Adverse reactions to current medications reviewed with patient..    Over the counter medications reviewed and reconciled with patient.    Exam:  Wt Readings from Last 3 Encounters:   01/05/24 81.7 kg (180 lb 1.9 oz)   10/03/23 81.5 kg (179 lb 10.8 oz)   09/20/23 81.7 kg (180 lb 1.9 oz)     Temp Readings from Last 3 Encounters:   01/05/24 98.4 °F (36.9 °C) (Tympanic)   10/03/23 97.7 °F (36.5 °C)   04/14/23 97.3 °F (36.3 °C) (Tympanic)     BP Readings from Last 3 Encounters:   01/05/24 130/74   10/03/23 124/76   09/20/23 132/72     Pulse Readings from Last 3 Encounters:   01/05/24 68   10/03/23 70   05/12/23 79     Body mass index is 30.92 kg/m².      Review of Systems   Cardiovascular:  Negative for chest pain and palpitations.   Musculoskeletal:         Right heel pain      Physical Exam  Nursing note reviewed.   Constitutional:       Appearance: She is obese.   HENT:      Head: Normocephalic and atraumatic.   Cardiovascular:      Rate and Rhythm: Normal rate and regular rhythm.      Pulses:           Dorsalis pedis pulses are 2+ on the right side.        Posterior tibial pulses are 2+ on the right side.   Pulmonary:      Effort: Pulmonary effort is normal. No respiratory distress.   Abdominal:      General: Bowel sounds are normal.   Musculoskeletal:          General: Tenderness present. No swelling.        Feet:    Feet:      Right foot:      Skin integrity: Skin integrity normal.      Toenail Condition: Right toenails are normal.      Comments: Tenderness in right heel   Neurological:      Mental Status: She is alert and oriented to person, place, and time.   Psychiatric:         Mood and Affect: Mood normal.         Behavior: Behavior normal.         Thought Content: Thought content normal.         Judgment: Judgment normal.         Laboratory Reviewed ({Yes)  Lab Results   Component Value Date    WBC 5.68 10/09/2023    HGB 14.0 10/09/2023    HCT 44.4 10/09/2023     10/09/2023    CHOL 223 (H) 10/09/2023    TRIG 92 10/09/2023    HDL 48 10/09/2023    ALT 14 10/09/2023    AST 17 10/09/2023     10/09/2023    K 3.9 10/09/2023     10/09/2023    CREATININE 0.7 10/09/2023    BUN 7 10/09/2023    CO2 27 10/09/2023    TSH 1.89 03/15/2022    INR 1.0 12/22/2011    GLUF 90 12/06/2011    HGBA1C 5.4 10/09/2023       Lia was seen today for heel pain.    Diagnoses and all orders for this visit:    Pain of right heel  -     X-Ray Foot Complete 3 view Right; Future  -     Ambulatory referral/consult to Podiatry; Future    Essential hypertension  At visit, Blood pressure is at goal. Continue current medications      Obesity (BMI 30-39.9)  Encouraged healthy diet and exercise as tolerated to help bring BMI into normal range.        Pt was given stretches to do  Discussed changing shoes  Xray today  Refer to podiatry       Care Plan/Goals: Reviewed    Goals         Blood Pressure < 140/90 (pt-stated)             Follow up: No follow-ups on file.    After visit summary was printed and given to patient upon discharge today.  Patient goals and care plan are included in After Visit Summary.

## 2024-01-20 ENCOUNTER — PATIENT MESSAGE (OUTPATIENT)
Dept: INTERNAL MEDICINE | Facility: CLINIC | Age: 59
End: 2024-01-20
Payer: COMMERCIAL

## 2024-02-07 ENCOUNTER — OFFICE VISIT (OUTPATIENT)
Dept: PODIATRY | Facility: CLINIC | Age: 59
End: 2024-02-07
Payer: COMMERCIAL

## 2024-02-07 ENCOUNTER — OFFICE VISIT (OUTPATIENT)
Dept: INTERNAL MEDICINE | Facility: CLINIC | Age: 59
End: 2024-02-07
Payer: COMMERCIAL

## 2024-02-07 VITALS
OXYGEN SATURATION: 100 % | RESPIRATION RATE: 20 BRPM | TEMPERATURE: 97 F | SYSTOLIC BLOOD PRESSURE: 132 MMHG | HEART RATE: 70 BPM | HEIGHT: 64 IN | WEIGHT: 177.69 LBS | BODY MASS INDEX: 30.34 KG/M2 | DIASTOLIC BLOOD PRESSURE: 80 MMHG

## 2024-02-07 VITALS — HEIGHT: 64 IN | WEIGHT: 177 LBS | BODY MASS INDEX: 30.22 KG/M2

## 2024-02-07 DIAGNOSIS — M79.671 PAIN OF RIGHT HEEL: ICD-10-CM

## 2024-02-07 DIAGNOSIS — E66.9 OBESITY (BMI 30.0-34.9): ICD-10-CM

## 2024-02-07 DIAGNOSIS — M77.31 HEEL SPUR, RIGHT: ICD-10-CM

## 2024-02-07 DIAGNOSIS — K21.9 CHRONIC GERD: ICD-10-CM

## 2024-02-07 DIAGNOSIS — Z76.89 ENCOUNTER TO ESTABLISH CARE: Primary | ICD-10-CM

## 2024-02-07 DIAGNOSIS — M72.2 PLANTAR FASCIITIS: Primary | ICD-10-CM

## 2024-02-07 DIAGNOSIS — I10 ESSENTIAL HYPERTENSION: ICD-10-CM

## 2024-02-07 DIAGNOSIS — B00.1 RECURRENT COLD SORES: ICD-10-CM

## 2024-02-07 PROCEDURE — 99999 PR PBB SHADOW E&M-EST. PATIENT-LVL III: CPT | Mod: PBBFAC,,, | Performed by: PODIATRIST

## 2024-02-07 PROCEDURE — 1159F MED LIST DOCD IN RCRD: CPT | Mod: CPTII,S$GLB,, | Performed by: PODIATRIST

## 2024-02-07 PROCEDURE — 1160F RVW MEDS BY RX/DR IN RCRD: CPT | Mod: CPTII,S$GLB,, | Performed by: INTERNAL MEDICINE

## 2024-02-07 PROCEDURE — 99203 OFFICE O/P NEW LOW 30 MIN: CPT | Mod: S$GLB,,, | Performed by: PODIATRIST

## 2024-02-07 PROCEDURE — 1160F RVW MEDS BY RX/DR IN RCRD: CPT | Mod: CPTII,S$GLB,, | Performed by: PODIATRIST

## 2024-02-07 PROCEDURE — 99214 OFFICE O/P EST MOD 30 MIN: CPT | Mod: S$GLB,,, | Performed by: INTERNAL MEDICINE

## 2024-02-07 PROCEDURE — 99999 PR PBB SHADOW E&M-EST. PATIENT-LVL IV: CPT | Mod: PBBFAC,,, | Performed by: INTERNAL MEDICINE

## 2024-02-07 PROCEDURE — 3079F DIAST BP 80-89 MM HG: CPT | Mod: CPTII,S$GLB,, | Performed by: INTERNAL MEDICINE

## 2024-02-07 PROCEDURE — 1159F MED LIST DOCD IN RCRD: CPT | Mod: CPTII,S$GLB,, | Performed by: INTERNAL MEDICINE

## 2024-02-07 PROCEDURE — 3008F BODY MASS INDEX DOCD: CPT | Mod: CPTII,S$GLB,, | Performed by: PODIATRIST

## 2024-02-07 PROCEDURE — 3008F BODY MASS INDEX DOCD: CPT | Mod: CPTII,S$GLB,, | Performed by: INTERNAL MEDICINE

## 2024-02-07 PROCEDURE — 3075F SYST BP GE 130 - 139MM HG: CPT | Mod: CPTII,S$GLB,, | Performed by: INTERNAL MEDICINE

## 2024-02-07 RX ORDER — DICLOFENAC SODIUM 75 MG/1
75 TABLET, DELAYED RELEASE ORAL 2 TIMES DAILY
Qty: 60 TABLET | Refills: 1 | Status: SHIPPED | OUTPATIENT
Start: 2024-02-07 | End: 2024-03-08

## 2024-02-07 RX ORDER — PANTOPRAZOLE SODIUM 40 MG/1
40 TABLET, DELAYED RELEASE ORAL DAILY
Qty: 90 TABLET | Refills: 3 | Status: SHIPPED | OUTPATIENT
Start: 2024-02-07

## 2024-02-07 RX ORDER — AMLODIPINE BESYLATE 2.5 MG/1
2.5 TABLET ORAL DAILY
Qty: 90 TABLET | Refills: 3 | Status: SHIPPED | OUTPATIENT
Start: 2024-02-07

## 2024-02-07 NOTE — PROGRESS NOTES
Subjective:       Patient ID: Lia Ragsdale is a 58 y.o. female.    Chief Complaint: Heel Pain (C/o right heel pain, pt was last seen by PCP Rebecca Christensen DO at 2/7/2024, nondiabetic, rate pain 7/10, pt is wearing tennis shoes and socks )      HPI: Lia Ragsdale complains of moderate pains to the lateral right foot. States pains are sharp and stabbing-like in nature. Pains are to the plantar foot, mostly with walking and standing. Rates the pains at approx. 7/10. States post-static dyskinesia. Denies any recent identifiable trauma. Does limp with gait. States seldom NSAID medications. Pains have been present for the past several weeks and the pains have worsened over the past couple of weeks. She does not have a history of plantar fasciitis. States walking and standing causes and/or exacerbates the symptoms. Patient has had no corticosteroid injection(s) to the left foot, prior. Patient's Primary Care Provider is Rebecca Christensen DO.     Review of patient's allergies indicates:  No Known Allergies    Past Medical History:   Diagnosis Date    Bladder instability 01/13/2015    Depression     History of depression in the past. Pt states it was from a stressful job.    Gallstones     General anesthetics causing adverse effect in therapeutic use     had respiratory distress after propofol    HPV (human papilloma virus) anogenital infection     Hypertension     Peptic ulcer disease        Family History   Problem Relation Age of Onset    Colon cancer Father     Arthritis Father     Diabetes Mother     Hypertension Mother     Arthritis Mother     Hypertension Sister     Breast cancer Sister 54        Stage 1    Cancer Sister        Social History     Socioeconomic History    Marital status:    Tobacco Use    Smoking status: Never    Smokeless tobacco: Never   Substance and Sexual Activity    Alcohol use: Yes     Alcohol/week: 6.0 standard drinks of alcohol     Types: 6 Cans of beer per week     Comment:  Socially drinks    Drug use: No    Sexual activity: Yes     Partners: Male     Birth control/protection: None     Comment: Tubes Tied     Social Determinants of Health     Financial Resource Strain: Low Risk  (2023)    Overall Financial Resource Strain (CARDIA)     Difficulty of Paying Living Expenses: Not hard at all   Food Insecurity: No Food Insecurity (2023)    Hunger Vital Sign     Worried About Running Out of Food in the Last Year: Never true     Ran Out of Food in the Last Year: Never true   Transportation Needs: No Transportation Needs (2023)    PRAPARE - Transportation     Lack of Transportation (Medical): No     Lack of Transportation (Non-Medical): No   Physical Activity: Inactive (2023)    Exercise Vital Sign     Days of Exercise per Week: 3 days     Minutes of Exercise per Session: 0 min   Stress: Stress Concern Present (2023)    [employer] - Occupational Stress Questionnaire     Feeling of Stress : Very much   Social Connections: Unknown (2023)    Social Connection and Isolation Panel [NHANES]     Frequency of Communication with Friends and Family: Three times a week     Frequency of Social Gatherings with Friends and Family: Never     Active Member of Clubs or Organizations: No     Attends Club or Organization Meetings: Never     Marital Status:    Housing Stability: Low Risk  (2023)    Housing Stability Vital Sign     Unable to Pay for Housing in the Last Year: No     Number of Places Lived in the Last Year: 1     Unstable Housing in the Last Year: No       Past Surgical History:   Procedure Laterality Date     SECTION, LOW TRANSVERSE      x 1    CHOLECYSTECTOMY      COLONOSCOPY N/A 2021    Procedure: COLONOSCOPY;  Surgeon: Rebecca Willoughby MD;  Location: [facility];  Service: Endoscopy;  Laterality: N/A;    TUBAL LIGATION         Review of Systems   Constitutional:  Negative for chills, fatigue and fever.   HENT:   "Negative for hearing loss.    Eyes:  Negative for photophobia and visual disturbance.   Respiratory:  Negative for cough, chest tightness, shortness of breath and wheezing.    Cardiovascular:  Negative for chest pain and palpitations.   Gastrointestinal:  Negative for constipation, diarrhea, nausea and vomiting.   Endocrine: Negative for cold intolerance and heat intolerance.   Genitourinary:  Negative for flank pain.   Musculoskeletal:  Positive for gait problem. Negative for neck pain and neck stiffness.   Neurological:  Negative for light-headedness and headaches.   Psychiatric/Behavioral:  Negative for sleep disturbance.          Objective:   Ht 5' 4" (1.626 m)   Wt 80.3 kg (177 lb)   LMP 07/18/2016   BMI 30.38 kg/m²     X-Ray Foot Complete 3 view Right  Narrative: EXAMINATION:  XR FOOT COMPLETE 3 VIEW RIGHT    CLINICAL HISTORY:  . Pain in right foot    TECHNIQUE:  AP, lateral, and oblique views of the right foot were performed.    COMPARISON:  None    FINDINGS:  No fracture dislocation.  Degenerative changes are identified of the 1st MTP joint space.  No significant soft tissue swelling.  Impression: Degenerative changes without fracture or dislocation.    Electronically signed by: Elvis Calixto MD  Date:    01/05/2024  Time:    15:17      Physical Exam   LOWER EXTREMITY PHYSICAL EXAMINATION    NEUROLOGY: Proprioception is intact. Sensation to light touch is intact. Negative Tinel's Sign and negative Valleix Sign. No neurological sensations with compression of the area of Jacques's Nerve in the area of the Abductor Hallucis muscle belly.    ORTHOPEDIC: There is no tenderness to palpation of the area around the plantar medial calcaneal tubercle on the right foot. Pains are characterized as sharp and stabbing-like with direct palpation of the area. There is also mild pain to palpation of the immediate plantar aspect of the heel, and moderate pains to the lateral band of the fascia. No edema is noted. No fullness " is noted. There are mild pains to palpation within the plantar fascia at the arch. No defects are noted within the plantar fascia at the arch. No plantar fibromas are noted. No defects are noted within the ligament. Dorsiflexion of the MTPJs with simultaneous palpation of the fascia at the arch, does not worsen and exacerbate the pains. Plantarflexion of the MTPJs with simultaneous palpation of the fascia at the arch, does worsen and exacerbate the pains.  No pains with medial to lateral compression of the heel. Equinus contracture is noted. Antalgic gait pattern is noted.     DERMATOLOGY: Skin is supple, dry and intact.    Assessment:     1. Plantar fasciitis    2. Pain of right heel    3. Heel spur, right          Plan:     Plantar fasciitis  -     diclofenac (VOLTAREN) 75 MG EC tablet; Take 1 tablet (75 mg total) by mouth 2 (two) times daily.  Dispense: 60 tablet; Refill: 1    Pain of right heel  -     Ambulatory referral/consult to Podiatry  -     diclofenac (VOLTAREN) 75 MG EC tablet; Take 1 tablet (75 mg total) by mouth 2 (two) times daily.  Dispense: 60 tablet; Refill: 1    Heel spur, right      Thorough discussion is had with the patient today, concerning the diagnosis, its etiology, and the treatment algorithm at present.     Did discuss proper and supportive shoe gear in detail and at length with the patient.  These are shoes with firm and robust arch support; medial counter.  Shoes which only bend at the metatarsophalangeal joint and which are rigid in the midfoot and hindfoot. Patient urged to purchase running type or cross training type shoes gear which are designed for pronation control.     Stretching exercises are discussed, taught, and are demonstrated to the patient this afternoon due to concomitant diagnosis of equinus contracture. The relationship between equinus contracture and the other aforementioned pathologies are detailed and outlined to the patient. The patient does acknowledge  understanding, and we will embark on a vigorous stretching algorithm for the lower extremity.            Future Appointments   Date Time Provider Department Center   10/9/2024 10:40 AM [facility]

## 2024-02-07 NOTE — PROGRESS NOTES
Lia Ragsdale  58 y.o. White female  [id number]    Chief Complaint:  Chief Complaint   Patient presents with    [facility]       History of Present Illness:  New patient to me. Previously a patient of Dr. Rivera.   HTN--stable. Needs refills.   GERD--stable. Needs refills.   She takes valacyclovir at times due to recurrent cold sores.   Has not been able to exercise much due to right foot pain. She will be seeing podiatry today.     Laboratory   Lab Results   Component Value Date    WBC 5.68 10/09/2023    HGB 14.0 10/09/2023    HCT 44.4 10/09/2023     10/09/2023    CHOL 223 (H) 10/09/2023    TRIG 92 10/09/2023    HDL 48 10/09/2023    ALT 14 10/09/2023    AST 17 10/09/2023     10/09/2023    K 3.9 10/09/2023     10/09/2023    CREATININE 0.7 10/09/2023    BUN 7 10/09/2023    CO2 27 10/09/2023    TSH 1.89 03/15/2022    INR 1.0 2011    HGBA1C 5.4 10/09/2023     Review of Systems   Constitutional:  Negative for fever.   Respiratory:  Negative for cough and shortness of breath.    Cardiovascular:  Negative for chest pain and leg swelling.   Gastrointestinal:  Negative for abdominal pain.   Genitourinary:  Negative for dysuria.   Musculoskeletal:  Positive for joint pain.   Neurological:  Negative for dizziness and headaches.       The following were reviewed: Active problem list, medication list, allergies, family history, social history, and Health Maintenance.     History:  Past Medical History:   Diagnosis Date    Bladder instability 2015    Depression     History of depression in the past. Pt states it was from a stressful job.    Gallstones     General anesthetics causing adverse effect in therapeutic use     had respiratory distress after propofol    HPV (human papilloma virus) anogenital infection     Hypertension     Peptic ulcer disease      Past Surgical History:   Procedure Laterality Date     SECTION, LOW TRANSVERSE      x 1    CHOLECYSTECTOMY      COLONOSCOPY N/A  11/01/2021    Procedure: COLONOSCOPY;  Surgeon: Rebecca Willoughby MD;  Location: [facility];  Service: Endoscopy;  Laterality: N/A;    TUBAL LIGATION       Family History   Problem Relation Age of Onset    Colon cancer Father     Arthritis Father     Diabetes Mother     Hypertension Mother     Arthritis Mother     Hypertension Sister     Breast cancer Sister 54        Stage 1    Cancer Sister      Social History     Socioeconomic History    Marital status:    Tobacco Use    Smoking status: Never    Smokeless tobacco: Never   Substance and Sexual Activity    Alcohol use: Yes     Alcohol/week: 6.0 standard drinks of alcohol     Types: 6 Cans of beer per week     Comment: Socially drinks    Drug use: No    Sexual activity: Yes     Partners: Male     Birth control/protection: None     Comment: Tubes Tied     Social Determinants of Health     Financial Resource Strain: Low Risk  (12/9/2023)    Overall Financial Resource Strain (CARDIA)     Difficulty of Paying Living Expenses: Not hard at all   Food Insecurity: No Food Insecurity (12/9/2023)    Hunger Vital Sign     Worried About Running Out of Food in the Last Year: Never true     Ran Out of Food in the Last Year: Never true   Transportation Needs: No Transportation Needs (12/9/2023)    PRAPARE - Transportation     Lack of Transportation (Medical): No     Lack of Transportation (Non-Medical): No   Physical Activity: Inactive (12/9/2023)    Exercise Vital Sign     Days of Exercise per Week: 3 days     Minutes of Exercise per Session: 0 min   Stress: Stress Concern Present (12/9/2023)    [employer] - Occupational Stress Questionnaire     Feeling of Stress : Very much   Social Connections: Unknown (12/9/2023)    Social Connection and Isolation Panel [NHANES]     Frequency of Communication with Friends and Family: Three times a week     Frequency of Social Gatherings with Friends and Family: Never     Active Member of Clubs or  Organizations: No     Attends Club or Organization Meetings: Never     Marital Status:    Housing Stability: Low Risk  (12/9/2023)    Housing Stability Vital Sign     Unable to Pay for Housing in the Last Year: No     Number of Places Lived in the Last Year: 1     Unstable Housing in the Last Year: No     Patient Active Problem List   Diagnosis    Bladder instability    Essential hypertension    Family history of colon cancer in father    Statin intolerance     Review of patient's allergies indicates:  No Known Allergies    Health Maintenance  Health Maintenance Topics with due status: Not Due       Topic Last Completion Date    TETANUS VACCINE 04/12/2017    Cervical Cancer Screening 07/28/2021    Colorectal Cancer Screening 11/01/2021    Hemoglobin A1c (Prediabetes) 10/09/2023    Lipid Panel 10/09/2023    Mammogram 10/13/2023     Health Maintenance Due   Topic Date Due    COVID-19 Vaccine (5 - 2023-24 season) 09/01/2023       Medications:  Current Outpatient Medications on File Prior to Visit   Medication Sig Dispense Refill    cetirizine (ZYRTEC) 10 MG tablet Take 10 mg by mouth daily as needed.       triamcinolone acetonide 0.1% (KENALOG) 0.1 % cream Apply topically 2 (two) times daily. 45 g 0    valACYclovir (VALTREX) 1000 MG tablet Take 1 tablet (1,000 mg total) by mouth 2 (two) times daily. 60 tablet 0    amLODIPine (NORVASC) 2.5 MG tablet Take 1 tablet (2.5 mg total) by mouth once daily. 90 tablet 1    pantoprazole (PROTONIX) 40 MG tablet Take 1 tablet (40 mg total) by mouth once daily. 90 tablet 3     No current facility-administered medications on file prior to visit.       Medications have been reviewed and reconciled with patient at visit today.    Exam:  Vitals:    02/07/24 1308   BP: 132/80   Pulse: 70   Resp: 20   Temp: 96.5 °F (35.8 °C)     Weight: 80.6 kg (177 lb 11.1 oz)   Body mass index is 30.5 kg/m².      Physical Exam  Vitals reviewed.   Constitutional:       General: She is not in acute  distress.     Appearance: She is well-developed. She is not ill-appearing.   Eyes:      General: No scleral icterus.  Cardiovascular:      Rate and Rhythm: Normal rate and regular rhythm.      Heart sounds: Normal heart sounds.   Pulmonary:      Effort: Pulmonary effort is normal. No respiratory distress.      Breath sounds: Normal breath sounds.   Abdominal:      General: Bowel sounds are normal.      Palpations: Abdomen is soft.   Skin:     General: Skin is warm and dry.   Neurological:      Mental Status: She is alert and oriented to person, place, and time.   Psychiatric:         Behavior: Behavior normal.       Assessment:  The primary encounter diagnosis was Encounter to establish care. Diagnoses of Essential hypertension, Chronic GERD, Recurrent cold sores, and Obesity (BMI 30.0-34.9) were also pertinent to this visit.    Plan:  Encounter to establish care    Essential hypertension  -     refill amLODIPine (NORVASC) 2.5 MG tablet; Take 1 tablet (2.5 mg total) by mouth once daily.  Dispense: 90 tablet; Refill: 3    Chronic GERD  -     refill pantoprazole (PROTONIX) 40 MG tablet; Take 1 tablet (40 mg total) by mouth once daily.  Dispense: 90 tablet; Refill: 3    Recurrent cold sores  -     continue valacyclovir as needed    Obesity (BMI 30.0-34.9)  -     Lifestyle modifications discussed    RTC in October for annual visit.

## 2024-02-15 ENCOUNTER — PATIENT MESSAGE (OUTPATIENT)
Dept: PODIATRY | Facility: CLINIC | Age: 59
End: 2024-02-15
Payer: COMMERCIAL

## 2024-02-27 ENCOUNTER — OFFICE VISIT (OUTPATIENT)
Dept: PODIATRY | Facility: CLINIC | Age: 59
End: 2024-02-27
Payer: COMMERCIAL

## 2024-02-27 DIAGNOSIS — M79.671 PAIN OF RIGHT HEEL: ICD-10-CM

## 2024-02-27 DIAGNOSIS — M72.2 PLANTAR FASCIITIS: Primary | ICD-10-CM

## 2024-02-27 DIAGNOSIS — M77.31 HEEL SPUR, RIGHT: ICD-10-CM

## 2024-02-27 PROCEDURE — 99213 OFFICE O/P EST LOW 20 MIN: CPT | Mod: 25,S$GLB,, | Performed by: PODIATRIST

## 2024-02-27 PROCEDURE — 1160F RVW MEDS BY RX/DR IN RCRD: CPT | Mod: CPTII,S$GLB,, | Performed by: PODIATRIST

## 2024-02-27 PROCEDURE — 1159F MED LIST DOCD IN RCRD: CPT | Mod: CPTII,S$GLB,, | Performed by: PODIATRIST

## 2024-02-27 PROCEDURE — 20550 NJX 1 TENDON SHEATH/LIGAMENT: CPT | Mod: RT,S$GLB,, | Performed by: PODIATRIST

## 2024-02-27 PROCEDURE — 99999 PR PBB SHADOW E&M-EST. PATIENT-LVL III: CPT | Mod: PBBFAC,,, | Performed by: PODIATRIST

## 2024-02-27 RX ORDER — TRIAMCINOLONE ACETONIDE 40 MG/ML
40 INJECTION, SUSPENSION INTRA-ARTICULAR; INTRAMUSCULAR ONCE
Status: COMPLETED | OUTPATIENT
Start: 2024-02-27 | End: 2024-02-27

## 2024-02-27 RX ORDER — DEXAMETHASONE SODIUM PHOSPHATE 4 MG/ML
4 INJECTION, SOLUTION INTRA-ARTICULAR; INTRALESIONAL; INTRAMUSCULAR; INTRAVENOUS; SOFT TISSUE ONCE
Status: COMPLETED | OUTPATIENT
Start: 2024-02-27 | End: 2024-02-27

## 2024-02-27 RX ADMIN — TRIAMCINOLONE ACETONIDE 40 MG: 40 INJECTION, SUSPENSION INTRA-ARTICULAR; INTRAMUSCULAR at 09:02

## 2024-02-27 RX ADMIN — DEXAMETHASONE SODIUM PHOSPHATE 4 MG: 4 INJECTION, SOLUTION INTRA-ARTICULAR; INTRALESIONAL; INTRAMUSCULAR; INTRAVENOUS; SOFT TISSUE at 09:02

## 2024-02-27 NOTE — PROGRESS NOTES
Subjective:       Patient ID: Lia Ragsdale is a 58 y.o. female.    Chief Complaint: Plantar Fasciitis (Plantar fascitis, rates pain 10, nondiabeteic, )      HPI: Lia Ragsdale presents to the office today, for follow-up concerning plantar fasciitis of the right foot. At this time, pains are 10/10 and are described as moderate to severe in nature. States analgic gait pattern. States walking and standing is very painful. To this juncture, patient has had no cortisone injections to the right foot. States NSAIDs. Patient's Primary Care Provider is Rebecca Christensen DO. States mild radiation of pains.     Review of patient's allergies indicates:  No Known Allergies    Past Medical History:   Diagnosis Date    Bladder instability 01/13/2015    Depression     History of depression in the past. Pt states it was from a stressful job.    Gallstones     General anesthetics causing adverse effect in therapeutic use     had respiratory distress after propofol    HPV (human papilloma virus) anogenital infection     Hypertension     Peptic ulcer disease        Family History   Problem Relation Age of Onset    Colon cancer Father     Arthritis Father     Diabetes Mother     Hypertension Mother     Arthritis Mother     Hypertension Sister     Breast cancer Sister 54        Stage 1    Cancer Sister        Social History     Socioeconomic History    Marital status:    Tobacco Use    Smoking status: Never    Smokeless tobacco: Never   Substance and Sexual Activity    Alcohol use: Yes     Alcohol/week: 6.0 standard drinks of alcohol     Types: 6 Cans of beer per week     Comment: Socially drinks    Drug use: No    Sexual activity: Yes     Partners: Male     Birth control/protection: None     Comment: Tubes Tied     Social Determinants of Health     Financial Resource Strain: Low Risk  (12/9/2023)    Overall Financial Resource Strain (CARDIA)     Difficulty of Paying Living Expenses: Not hard at all   Food Insecurity: No  Food Insecurity (2023)    Hunger Vital Sign     Worried About Running Out of Food in the Last Year: Never true     Ran Out of Food in the Last Year: Never true   Transportation Needs: No Transportation Needs (2023)    PRAPARE - Transportation     Lack of Transportation (Medical): No     Lack of Transportation (Non-Medical): No   Physical Activity: Inactive (2023)    Exercise Vital Sign     Days of Exercise per Week: 3 days     Minutes of Exercise per Session: 0 min   Stress: Stress Concern Present (2023)    [employer] - Occupational Stress Questionnaire     Feeling of Stress : Very much   Social Connections: Unknown (2023)    Social Connection and Isolation Panel [NHANES]     Frequency of Communication with Friends and Family: Three times a week     Frequency of Social Gatherings with Friends and Family: Never     Active Member of Clubs or Organizations: No     Attends Club or Organization Meetings: Never     Marital Status:    Housing Stability: Low Risk  (2023)    Housing Stability Vital Sign     Unable to Pay for Housing in the Last Year: No     Number of Places Lived in the Last Year: 1     Unstable Housing in the Last Year: No       Past Surgical History:   Procedure Laterality Date     SECTION, LOW TRANSVERSE      x 1    CHOLECYSTECTOMY      COLONOSCOPY N/A 2021    Procedure: COLONOSCOPY;  Surgeon: Rebecca Willoughby MD;  Location: [facility];  Service: Endoscopy;  Laterality: N/A;    TUBAL LIGATION         Review of Systems   Constitutional:  Negative for chills, fatigue and fever.   HENT:  Negative for hearing loss.    Eyes:  Negative for photophobia and visual disturbance.   Respiratory:  Negative for cough, chest tightness, shortness of breath and wheezing.    Cardiovascular:  Negative for chest pain and palpitations.   Gastrointestinal:  Negative for constipation, diarrhea, nausea and vomiting.   Endocrine: Negative for cold  intolerance and heat intolerance.   Genitourinary:  Negative for flank pain.   Musculoskeletal:  Positive for gait problem. Negative for neck pain and neck stiffness.   Neurological:  Negative for light-headedness and headaches.   Psychiatric/Behavioral:  Negative for sleep disturbance.          Objective:   LMP 07/18/2016     X-Ray Foot Complete 3 view Right  Narrative: EXAMINATION:  XR FOOT COMPLETE 3 VIEW RIGHT    CLINICAL HISTORY:  . Pain in right foot    TECHNIQUE:  AP, lateral, and oblique views of the right foot were performed.    COMPARISON:  None    FINDINGS:  No fracture dislocation.  Degenerative changes are identified of the 1st MTP joint space.  No significant soft tissue swelling.  Impression: Degenerative changes without fracture or dislocation.    Electronically signed by: Elvis Calixto MD  Date:    01/05/2024  Time:    15:17      Physical Exam    LOWER EXTREMITY PHYSICAL EXAMINATION    NEUROLOGY: Negative Tinel's Sign and negative Valleix Sign. No neurological sensations with compression of the area of Jacques's Nerve in the area of the Abductor Hallucis muscle belly.    ORTHOPEDIC: There is mild tenderness to palpation of the area around the plantar medial calcaneal tubercle on the right foot. Pains are characterized as sharp and stabbing-like with direct palpation of the area. There is also moderate pain to palpation of the immediate plantar aspect of the heel, and mild pains to the lateral band of the fascia. No edema is noted. No fullness is noted. There are mild pains to palpation within the plantar fascia at the arch. No defects are noted within the plantar fascia at the arch. No plantar fibromas are noted. No defects are noted within the ligament. Dorsiflexion of the MTPJs with simultaneous palpation of the fascia at the arch, does not worsen and exacerbate the pains. Plantarflexion of the MTPJs with simultaneous palpation of the fascia at the arch, does worsen and exacerbate the pains.  No  pains with medial to lateral compression of the heel. Equinus contracture is noted. Antalgic gait pattern is noted.     Assessment:     1. Plantar fasciitis    2. Pain of right heel    3. Heel spur, right          Plan:     Plantar fasciitis  -     triamcinolone acetonide injection 40 mg  -     dexAMETHasone injection 4 mg    Pain of right heel    Heel spur, right        Thorough discussion is had with the patient today, concerning the diagnosis, its etiology, and the treatment algorithm at present.     XRAYS are reviewed in detail with the patient. All questions and concerns regarding findings and its/their implications are outlined and discussed.    Following sterile preparation with alcohol swab and/or betadine paint, injection was given into and around the area of the right plantar medial calcaneal tubercle, using a 25-gauge needle. Injection consisted of a mixture of Lidocaine w/ Epinephrine, Kenalog-40, and Dexamethasone.             Future Appointments   Date Time Provider Department Center   2/27/2024  9:15 AM Shawn Nicolas DPM [facility]   10/9/2024 10:40 AM Rebecca Christensen DO [facility]

## 2024-03-03 ENCOUNTER — PATIENT MESSAGE (OUTPATIENT)
Dept: INTERNAL MEDICINE | Facility: CLINIC | Age: 59
End: 2024-03-03
Payer: COMMERCIAL

## 2024-03-12 LAB
ALBUMIN SERPL BCP-MCNC: 4.2 G/DL (ref 3.8–4.9)
ALBUMIN/GLOBULIN RATIO: 1.6 (ref 1.2–2.2)
ALP SERPL-CCNC: 68 U/L (ref 44–121)
ALT SERPL W P-5'-P-CCNC: 15 U/L (ref 0–32)
AST SERPL-CCNC: 12 U/L (ref 0–40)
BILIRUB SERPL-MCNC: 0.4 MG/DL (ref 0–1.2)
BUN SERPL-MCNC: 19 MG/DL (ref 6–24)
BUN/CREAT RATIO: 23 (ref 9–23)
CALCIUM SERPL-MCNC: 9.3 MG/DL (ref 8.7–10.2)
CHLORIDE SERPL-SCNC: 102 MMOL/L (ref 96–106)
CHOLESTEROL, TOTAL: 232 (ref 100–199)
CREAT SERPL-MCNC: 0.8 MG/DL (ref 0.6–1)
EGFR: 83
GLOBULIN: 2.7 (ref 1.5–4.5)
GLUCOSE SERPL-MCNC: 95 MG/DL (ref 70–99)
HBA1C MFR BLD: 5.8 % (ref 4.8–5.6)
HDL/CHOLESTEROL RATIO: 4.1 % (ref 0–4.4)
HDLC SERPL-MCNC: 57 MG/DL
LDLC SERPL CALC-MCNC: 160 MG/DL (ref 0–99)
POTASSIUM SERPL-SCNC: 4.4 MMOL/L (ref 3.5–5.2)
PROT SNV-MCNC: 6.9 G/L (ref 6–8.5)
SODIUM BLD-SCNC: 140 MMOL/L (ref 134–144)
TRIGL SERPL-MCNC: 86 MG/DL (ref 0–149)
VLDLC SERPL-MCNC: 15 MG/DL (ref 5–40)

## 2024-03-15 ENCOUNTER — PATIENT MESSAGE (OUTPATIENT)
Dept: INTERNAL MEDICINE | Facility: CLINIC | Age: 59
End: 2024-03-15
Payer: COMMERCIAL

## 2024-03-19 ENCOUNTER — OFFICE VISIT (OUTPATIENT)
Dept: INTERNAL MEDICINE | Facility: CLINIC | Age: 59
End: 2024-03-19
Payer: COMMERCIAL

## 2024-03-19 ENCOUNTER — OFFICE VISIT (OUTPATIENT)
Dept: PODIATRY | Facility: CLINIC | Age: 59
End: 2024-03-19
Payer: COMMERCIAL

## 2024-03-19 VITALS
WEIGHT: 177.94 LBS | TEMPERATURE: 96 F | HEART RATE: 72 BPM | SYSTOLIC BLOOD PRESSURE: 130 MMHG | DIASTOLIC BLOOD PRESSURE: 80 MMHG | BODY MASS INDEX: 30.38 KG/M2 | HEIGHT: 64 IN | OXYGEN SATURATION: 97 %

## 2024-03-19 VITALS — HEIGHT: 64 IN | BODY MASS INDEX: 30.22 KG/M2 | WEIGHT: 177 LBS

## 2024-03-19 DIAGNOSIS — M72.2 PLANTAR FASCIITIS: Primary | ICD-10-CM

## 2024-03-19 DIAGNOSIS — I10 ESSENTIAL HYPERTENSION: ICD-10-CM

## 2024-03-19 DIAGNOSIS — F41.9 ANXIETY: Primary | ICD-10-CM

## 2024-03-19 DIAGNOSIS — M77.31 HEEL SPUR, RIGHT: ICD-10-CM

## 2024-03-19 DIAGNOSIS — M79.671 PAIN OF RIGHT HEEL: ICD-10-CM

## 2024-03-19 PROCEDURE — 99214 OFFICE O/P EST MOD 30 MIN: CPT | Mod: 25,S$GLB,, | Performed by: PODIATRIST

## 2024-03-19 PROCEDURE — 20550 NJX 1 TENDON SHEATH/LIGAMENT: CPT | Mod: RT,S$GLB,, | Performed by: PODIATRIST

## 2024-03-19 PROCEDURE — 99999 PR PBB SHADOW E&M-EST. PATIENT-LVL III: CPT | Mod: PBBFAC,,, | Performed by: PODIATRIST

## 2024-03-19 PROCEDURE — 99214 OFFICE O/P EST MOD 30 MIN: CPT | Mod: S$GLB,,, | Performed by: PHYSICIAN ASSISTANT

## 2024-03-19 PROCEDURE — 1159F MED LIST DOCD IN RCRD: CPT | Mod: CPTII,S$GLB,, | Performed by: PHYSICIAN ASSISTANT

## 2024-03-19 PROCEDURE — 3079F DIAST BP 80-89 MM HG: CPT | Mod: CPTII,S$GLB,, | Performed by: PHYSICIAN ASSISTANT

## 2024-03-19 PROCEDURE — 1160F RVW MEDS BY RX/DR IN RCRD: CPT | Mod: CPTII,S$GLB,, | Performed by: PODIATRIST

## 2024-03-19 PROCEDURE — 3075F SYST BP GE 130 - 139MM HG: CPT | Mod: CPTII,S$GLB,, | Performed by: PHYSICIAN ASSISTANT

## 2024-03-19 PROCEDURE — 1159F MED LIST DOCD IN RCRD: CPT | Mod: CPTII,S$GLB,, | Performed by: PODIATRIST

## 2024-03-19 PROCEDURE — 1160F RVW MEDS BY RX/DR IN RCRD: CPT | Mod: CPTII,S$GLB,, | Performed by: PHYSICIAN ASSISTANT

## 2024-03-19 PROCEDURE — 3008F BODY MASS INDEX DOCD: CPT | Mod: CPTII,S$GLB,, | Performed by: PHYSICIAN ASSISTANT

## 2024-03-19 PROCEDURE — 3008F BODY MASS INDEX DOCD: CPT | Mod: CPTII,S$GLB,, | Performed by: PODIATRIST

## 2024-03-19 PROCEDURE — 99999 PR PBB SHADOW E&M-EST. PATIENT-LVL IV: CPT | Mod: PBBFAC,,, | Performed by: PHYSICIAN ASSISTANT

## 2024-03-19 RX ORDER — DEXAMETHASONE SODIUM PHOSPHATE 4 MG/ML
4 INJECTION, SOLUTION INTRA-ARTICULAR; INTRALESIONAL; INTRAMUSCULAR; INTRAVENOUS; SOFT TISSUE ONCE
Status: COMPLETED | OUTPATIENT
Start: 2024-03-19 | End: 2024-03-19

## 2024-03-19 RX ORDER — HYDROCODONE BITARTRATE AND ACETAMINOPHEN 5; 325 MG/1; MG/1
1 TABLET ORAL EVERY 8 HOURS PRN
Qty: 30 TABLET | Refills: 0 | Status: SHIPPED | OUTPATIENT
Start: 2024-03-19 | End: 2024-03-29

## 2024-03-19 RX ORDER — TRIAMCINOLONE ACETONIDE 40 MG/ML
40 INJECTION, SUSPENSION INTRA-ARTICULAR; INTRAMUSCULAR ONCE
Status: COMPLETED | OUTPATIENT
Start: 2024-03-19 | End: 2024-03-19

## 2024-03-19 RX ORDER — BUSPIRONE HYDROCHLORIDE 5 MG/1
5 TABLET ORAL 2 TIMES DAILY PRN
Qty: 60 TABLET | Refills: 1 | Status: SHIPPED | OUTPATIENT
Start: 2024-03-19 | End: 2025-03-19

## 2024-03-19 RX ORDER — SERTRALINE HYDROCHLORIDE 25 MG/1
25 TABLET, FILM COATED ORAL DAILY
Qty: 30 TABLET | Refills: 2 | Status: SHIPPED | OUTPATIENT
Start: 2024-03-19 | End: 2024-04-18 | Stop reason: SDUPTHER

## 2024-03-19 RX ADMIN — DEXAMETHASONE SODIUM PHOSPHATE 4 MG: 4 INJECTION, SOLUTION INTRA-ARTICULAR; INTRALESIONAL; INTRAMUSCULAR; INTRAVENOUS; SOFT TISSUE at 08:03

## 2024-03-19 RX ADMIN — TRIAMCINOLONE ACETONIDE 40 MG: 40 INJECTION, SUSPENSION INTRA-ARTICULAR; INTRAMUSCULAR at 08:03

## 2024-03-19 NOTE — PROGRESS NOTES
Subjective:       Patient ID: Lia Ragsdale is a 58 y.o. female.    Chief Complaint: Foot Pain (C/o foot pain, pt is wearing tennis shoes, rate pain 4/10, nondiabetic)      HPI: Lia Ragsdale presents to the office today, for follow-up concerning plantar fasciitis of the right foot. At this time, pains are 4/10 and are described as mild in nature. States less of an analgic gait pattern. States walking and standing is not very painful. To this juncture, patient has had 1 cortisone injections to the right foot. States NSAIDs. Patient's Primary Care Provider is Rebecca Christensen DO.     Review of patient's allergies indicates:  No Known Allergies    Past Medical History:   Diagnosis Date    Bladder instability 01/13/2015    Depression     History of depression in the past. Pt states it was from a stressful job.    Gallstones     General anesthetics causing adverse effect in therapeutic use     had respiratory distress after propofol    HPV (human papilloma virus) anogenital infection     Hypertension     Peptic ulcer disease        Family History   Problem Relation Age of Onset    Colon cancer Father     Arthritis Father     Diabetes Mother     Hypertension Mother     Arthritis Mother     Hypertension Sister     Breast cancer Sister 54        Stage 1    Cancer Sister        Social History     Socioeconomic History    Marital status:    Tobacco Use    Smoking status: Never    Smokeless tobacco: Never   Substance and Sexual Activity    Alcohol use: Yes     Alcohol/week: 6.0 standard drinks of alcohol     Types: 6 Cans of beer per week     Comment: Socially drinks    Drug use: No    Sexual activity: Yes     Partners: Male     Birth control/protection: None     Comment: Tubes Tied     Social Determinants of Health     Financial Resource Strain: Low Risk  (12/9/2023)    Overall Financial Resource Strain (CARDIA)     Difficulty of Paying Living Expenses: Not hard at all   Food Insecurity: No Food Insecurity  (2023)    Hunger Vital Sign     Worried About Running Out of Food in the Last Year: Never true     Ran Out of Food in the Last Year: Never true   Transportation Needs: No Transportation Needs (2023)    PRAPARE - Transportation     Lack of Transportation (Medical): No     Lack of Transportation (Non-Medical): No   Physical Activity: Inactive (2023)    Exercise Vital Sign     Days of Exercise per Week: 3 days     Minutes of Exercise per Session: 0 min   Stress: Stress Concern Present (2023)    [employer] - Occupational Stress Questionnaire     Feeling of Stress : Very much   Social Connections: Unknown (2023)    Social Connection and Isolation Panel [NHANES]     Frequency of Communication with Friends and Family: Three times a week     Frequency of Social Gatherings with Friends and Family: Never     Active Member of Clubs or Organizations: No     Attends Club or Organization Meetings: Never     Marital Status:    Housing Stability: Low Risk  (2023)    Housing Stability Vital Sign     Unable to Pay for Housing in the Last Year: No     Number of Places Lived in the Last Year: 1     Unstable Housing in the Last Year: No       Past Surgical History:   Procedure Laterality Date     SECTION, LOW TRANSVERSE      x 1    CHOLECYSTECTOMY      COLONOSCOPY N/A 2021    Procedure: COLONOSCOPY;  Surgeon: Rebecca Willoughby MD;  Location: [facility];  Service: Endoscopy;  Laterality: N/A;    TUBAL LIGATION         Review of Systems   Constitutional:  Negative for chills, fatigue and fever.   HENT:  Negative for hearing loss.    Eyes:  Negative for photophobia and visual disturbance.   Respiratory:  Negative for cough, chest tightness, shortness of breath and wheezing.    Cardiovascular:  Negative for chest pain and palpitations.   Gastrointestinal:  Negative for constipation, diarrhea, nausea and vomiting.   Endocrine: Negative for cold intolerance and  "heat intolerance.   Genitourinary:  Negative for flank pain.   Musculoskeletal:  Positive for gait problem. Negative for neck pain and neck stiffness.   Neurological:  Negative for light-headedness and headaches.   Psychiatric/Behavioral:  Negative for sleep disturbance.          Objective:   Ht 5' 4" (1.626 m)   Wt 80.3 kg (177 lb 0.5 oz)   LMP 07/18/2016   BMI 30.39 kg/m²     X-Ray Foot Complete 3 view Right  Narrative: EXAMINATION:  XR FOOT COMPLETE 3 VIEW RIGHT    CLINICAL HISTORY:  . Pain in right foot    TECHNIQUE:  AP, lateral, and oblique views of the right foot were performed.    COMPARISON:  None    FINDINGS:  No fracture dislocation.  Degenerative changes are identified of the 1st MTP joint space.  No significant soft tissue swelling.  Impression: Degenerative changes without fracture or dislocation.    Electronically signed by: Elvis Calixto MD  Date:    01/05/2024  Time:    15:17      Physical Exam    LOWER EXTREMITY PHYSICAL EXAMINATION    NEUROLOGY: Negative Tinel's Sign and negative Valleix Sign. No neurological sensations with compression of the area of Jacques's Nerve in the area of the Abductor Hallucis muscle belly.    ORTHOPEDIC: There is mild tenderness to palpation of the area around the plantar medial calcaneal tubercle on the right foot. Pains are characterized as sharp and stabbing-like with direct palpation of the area. There is also mild pain to palpation of the immediate plantar aspect of the heel, and no pains to the lateral band of the fascia. No edema is noted. No fullness is noted. There are mild pains to palpation within the plantar fascia at the arch. No defects are noted within the plantar fascia at the arch. No plantar fibromas are noted. No defects are noted within the ligament. Dorsiflexion of the MTPJs with simultaneous palpation of the fascia at the arch, does not worsen and exacerbate the pains. Plantarflexion of the MTPJs with simultaneous palpation of the fascia at the " arch, does worsen and exacerbate the pains.  No pains with medial to lateral compression of the heel. Equinus contracture is noted. Antalgic gait pattern is noted.     Assessment:     1. Plantar fasciitis    2. Pain of right heel    3. Heel spur, right          Plan:     Plantar fasciitis  -     dexAMETHasone injection 4 mg  -     triamcinolone acetonide injection 40 mg  -     HYDROcodone-acetaminophen (NORCO) 5-325 mg per tablet; Take 1 tablet by mouth every 8 (eight) hours as needed for Pain.  Dispense: 30 tablet; Refill: 0    Pain of right heel  -     HYDROcodone-acetaminophen (NORCO) 5-325 mg per tablet; Take 1 tablet by mouth every 8 (eight) hours as needed for Pain.  Dispense: 30 tablet; Refill: 0    Heel spur, right      Thorough discussion is had with the patient today, concerning the diagnosis, its etiology, and the treatment algorithm at present.     Following sterile preparation with alcohol swab and/or betadine paint, injection was given into and around the area of the right plantar medial calcaneal tubercle, using a 25-gauge needle. Injection consisted of a mixture of Lidocaine w/ Epinephrine, Kenalog-40, and Dexamethasone.      Did discuss proper and supportive shoe gear in detail and at length with the patient.  These are shoes with firm and robust arch support; medial counter.  Shoes which only bend at the metatarsophalangeal joint and which are rigid in the midfoot and hindfoot. Patient urged to purchase running type or cross training type shoes gear which are designed for pronation control.            Future Appointments   Date Time Provider Department Center   3/19/2024 10:20 AM Jaci Perdue PA-C [facility]   10/9/2024 10:40 AM Rebecca Christensen DO [facility]

## 2024-03-19 NOTE — PROGRESS NOTES
"Subjective:      Patient ID: Lia Ragsdale is a 58 y.o. female.    Chief Complaint: Stress and Depression    Patient is known to me, being seen today for stress/anxiety.   Family feels she gets agitated easily  Patient denies lashing out but can be irritable towards her , she has little patience with him at the end of a stressful work day   She also has trouble focusing at times    Reports years ago was on effexor but just for short time     Last visit Feb 2024 with PCP.       Review of Systems   Constitutional:  Negative for chills, diaphoresis and fever.   HENT:  Negative for congestion, rhinorrhea and sore throat.    Respiratory:  Negative for cough, shortness of breath and wheezing.    Gastrointestinal:  Negative for abdominal pain, constipation, diarrhea, nausea and vomiting.   Skin:  Negative for rash.   Neurological:  Negative for dizziness, light-headedness and headaches.   Psychiatric/Behavioral:  Positive for sleep disturbance. Negative for dysphoric mood. The patient is nervous/anxious.        Objective:   /80   Pulse 72   Temp 96.4 °F (35.8 °C) (Tympanic)   Ht 5' 4" (1.626 m)   Wt 80.7 kg (177 lb 14.6 oz)   LMP 07/18/2016   SpO2 97%   BMI 30.54 kg/m²   Physical Exam  Constitutional:       General: She is not in acute distress.     Appearance: She is well-developed. She is not ill-appearing or diaphoretic.   HENT:      Head: Normocephalic and atraumatic.      Right Ear: External ear normal.      Left Ear: External ear normal.   Eyes:      General: Lids are normal.         Right eye: No discharge.         Left eye: No discharge.      Conjunctiva/sclera: Conjunctivae normal.      Right eye: Right conjunctiva is not injected.      Left eye: Left conjunctiva is not injected.   Pulmonary:      Effort: Pulmonary effort is normal. No respiratory distress.   Skin:     General: Skin is warm and dry.      Findings: No rash.   Neurological:      Mental Status: She is alert and oriented to " person, place, and time.   Psychiatric:         Speech: Speech normal.         Behavior: Behavior normal.         Thought Content: Thought content normal.         Judgment: Judgment normal.      Comments: ROBERT 17  PHQ 10        Assessment:      1. Anxiety    2. Essential hypertension       Plan:   Anxiety  -     sertraline (ZOLOFT) 25 MG tablet; Take 1 tablet (25 mg total) by mouth once daily.  Dispense: 30 tablet; Refill: 2  -     busPIRone (BUSPAR) 5 MG Tab; Take 1 tablet (5 mg total) by mouth 2 (two) times daily as needed (Anxiety).  Dispense: 60 tablet; Refill: 1    Essential hypertension   Controlled     Discussed management of anxiety/depression are multi-factoral and aside from medicine, healthy lifestyle choices, therapy and hobbies are going to benefit her overall mental health  Discussed SSRI in detail   Daiyl zoloft, buspar prn   1mth f/u anxiety virtual visit     Discussed worsening signs/symptoms and when to return to clinic or go to ED.   Patient expresses understanding and agrees with treatment plan.

## 2024-03-20 ENCOUNTER — PATIENT OUTREACH (OUTPATIENT)
Dept: ADMINISTRATIVE | Facility: HOSPITAL | Age: 59
End: 2024-03-20
Payer: COMMERCIAL

## 2024-03-20 NOTE — PROGRESS NOTES
Population Health Chart Review & Patient Outreach Details      Additional Pop Health Notes:    Manually uploaded and hyper-linked HEMOGLOBIN A1C, CMP & LIPID PANEL 3/12/2024       Updates Requested / Reviewed:      Care Everywhere, , Care Team Updated, and Immunizations Reconciliation Completed or Queried: [place]         Health Maintenance Topics Overdue:      [facility] Score: 0     Patient is not due for any topics at this time.                       Health Maintenance Topic(s) Outreach Outcomes & Actions Taken:    Lab(s) - Outreach Outcomes & Actions Taken  : External Records Uploaded & Care Team Updated if Applicable

## 2024-04-08 ENCOUNTER — PATIENT MESSAGE (OUTPATIENT)
Dept: PODIATRY | Facility: CLINIC | Age: 59
End: 2024-04-08
Payer: COMMERCIAL

## 2024-04-16 ENCOUNTER — OFFICE VISIT (OUTPATIENT)
Dept: PODIATRY | Facility: CLINIC | Age: 59
End: 2024-04-16
Payer: COMMERCIAL

## 2024-04-16 ENCOUNTER — TELEPHONE (OUTPATIENT)
Dept: PHYSICAL MEDICINE AND REHAB | Facility: CLINIC | Age: 59
End: 2024-04-16
Payer: COMMERCIAL

## 2024-04-16 VITALS — HEIGHT: 64 IN | BODY MASS INDEX: 30.38 KG/M2 | WEIGHT: 177.94 LBS

## 2024-04-16 DIAGNOSIS — M77.31 HEEL SPUR, RIGHT: ICD-10-CM

## 2024-04-16 DIAGNOSIS — M51.36 DDD (DEGENERATIVE DISC DISEASE), LUMBAR: ICD-10-CM

## 2024-04-16 DIAGNOSIS — M79.671 PAIN OF RIGHT HEEL: ICD-10-CM

## 2024-04-16 DIAGNOSIS — M72.2 PLANTAR FASCIITIS: Primary | ICD-10-CM

## 2024-04-16 DIAGNOSIS — M54.16 LUMBAR RADICULOPATHY: ICD-10-CM

## 2024-04-16 PROCEDURE — 1159F MED LIST DOCD IN RCRD: CPT | Mod: CPTII,S$GLB,, | Performed by: PODIATRIST

## 2024-04-16 PROCEDURE — 3008F BODY MASS INDEX DOCD: CPT | Mod: CPTII,S$GLB,, | Performed by: PODIATRIST

## 2024-04-16 PROCEDURE — 99999 PR PBB SHADOW E&M-EST. PATIENT-LVL III: CPT | Mod: PBBFAC,,, | Performed by: PODIATRIST

## 2024-04-16 PROCEDURE — 99214 OFFICE O/P EST MOD 30 MIN: CPT | Mod: S$GLB,,, | Performed by: PODIATRIST

## 2024-04-16 PROCEDURE — 3044F HG A1C LEVEL LT 7.0%: CPT | Mod: CPTII,S$GLB,, | Performed by: PODIATRIST

## 2024-04-16 PROCEDURE — 1160F RVW MEDS BY RX/DR IN RCRD: CPT | Mod: CPTII,S$GLB,, | Performed by: PODIATRIST

## 2024-04-16 RX ORDER — GABAPENTIN 300 MG/1
300 CAPSULE ORAL NIGHTLY
Qty: 30 CAPSULE | Refills: 0 | Status: SHIPPED | OUTPATIENT
Start: 2024-04-16 | End: 2024-05-12 | Stop reason: SDUPTHER

## 2024-04-16 NOTE — PROGRESS NOTES
Subjective:       Patient ID: Lia Ragsdale is a 58 y.o. female.    Chief Complaint: Plantar Fasciitis (Plantar fasciitis, rates pain 7, nondiabetic,  )      HPI: Lia Ragsdale presents to the office today, for follow-up concerning plantar fasciitis of the right foot. At this time, pains are 7/10 and are described as moderate in nature. States analgic gait pattern. States walking and standing is very painful. To this juncture, patient has had 2 cortisone injections to the right foot. States NSAIDs. Patient's Primary Care Provider is Rebecca Christensen DO. States nocturnal foot pains that often wake her up at night. Does have a Hx of lumbar DDD.     Review of patient's allergies indicates:  No Known Allergies    Past Medical History:   Diagnosis Date    Bladder instability 01/13/2015    Depression     History of depression in the past. Pt states it was from a stressful job.    Gallstones     General anesthetics causing adverse effect in therapeutic use     had respiratory distress after propofol    HPV (human papilloma virus) anogenital infection     Hypertension     Peptic ulcer disease        Family History   Problem Relation Name Age of Onset    Colon cancer Father Duarte     Arthritis Father Duarte     Diabetes Mother Ramona Quezada     Hypertension Mother Ramona Quezada     Arthritis Mother Ramona Quezada     Hypertension Sister Ann Marie Chowdhury     Breast cancer Sister Ann Marie Chowdhury 54        Stage 1    Cancer Sister Ann Marie Chowdhury        Social History     Socioeconomic History    Marital status:    Tobacco Use    Smoking status: Never    Smokeless tobacco: Never   Substance and Sexual Activity    Alcohol use: Yes     Alcohol/week: 6.0 standard drinks of alcohol     Types: 6 Cans of beer per week     Comment: Socially drinks    Drug use: No    Sexual activity: Yes     Partners: Male     Birth control/protection: None     Comment: Tubes Tied     Social Determinants of Health     Financial Resource Strain: Low  Risk  (2023)    Overall Financial Resource Strain (CARDIA)     Difficulty of Paying Living Expenses: Not hard at all   Food Insecurity: No Food Insecurity (2023)    Hunger Vital Sign     Worried About Running Out of Food in the Last Year: Never true     Ran Out of Food in the Last Year: Never true   Transportation Needs: No Transportation Needs (2023)    PRAPARE - Transportation     Lack of Transportation (Medical): No     Lack of Transportation (Non-Medical): No   Physical Activity: Inactive (2023)    Exercise Vital Sign     Days of Exercise per Week: 3 days     Minutes of Exercise per Session: 0 min   Stress: Stress Concern Present (2023)    [employer] - Occupational Stress Questionnaire     Feeling of Stress : Very much   Social Connections: Unknown (2023)    Social Connection and Isolation Panel [NHANES]     Frequency of Communication with Friends and Family: Three times a week     Frequency of Social Gatherings with Friends and Family: Never     Active Member of Clubs or Organizations: No     Attends Club or Organization Meetings: Never     Marital Status:    Housing Stability: Low Risk  (2023)    Housing Stability Vital Sign     Unable to Pay for Housing in the Last Year: No     Number of Places Lived in the Last Year: 1     Unstable Housing in the Last Year: No       Past Surgical History:   Procedure Laterality Date     SECTION, LOW TRANSVERSE      x 1    CHOLECYSTECTOMY      COLONOSCOPY N/A 2021    Procedure: COLONOSCOPY;  Surgeon: Rebecca Willoughby MD;  Location: [facility];  Service: Endoscopy;  Laterality: N/A;    TUBAL LIGATION         Review of Systems   Constitutional:  Negative for chills, fatigue and fever.   HENT:  Negative for hearing loss.    Eyes:  Negative for photophobia and visual disturbance.   Respiratory:  Negative for cough, chest tightness, shortness of breath and wheezing.    Cardiovascular:  Negative  "for chest pain and palpitations.   Gastrointestinal:  Negative for constipation, diarrhea, nausea and vomiting.   Endocrine: Negative for cold intolerance and heat intolerance.   Genitourinary:  Negative for flank pain.   Musculoskeletal:  Positive for gait problem. Negative for neck pain and neck stiffness.   Neurological:  Negative for light-headedness and headaches.        Neuritis    Psychiatric/Behavioral:  Negative for sleep disturbance.          Objective:   Ht 5' 4" (1.626 m)   Wt 80.7 kg (177 lb 14.6 oz)   LMP 07/18/2016   BMI 30.54 kg/m²     X-Ray Foot Complete 3 view Right  Narrative: EXAMINATION:  XR FOOT COMPLETE 3 VIEW RIGHT    CLINICAL HISTORY:  . Pain in right foot    TECHNIQUE:  AP, lateral, and oblique views of the right foot were performed.    COMPARISON:  None    FINDINGS:  No fracture dislocation.  Degenerative changes are identified of the 1st MTP joint space.  No significant soft tissue swelling.  Impression: Degenerative changes without fracture or dislocation.    Electronically signed by: Elvis Calixto MD  Date:    01/05/2024  Time:    15:17      Physical Exam    LOWER EXTREMITY PHYSICAL EXAMINATION    NEUROLOGY: Negative Tinel's Sign and negative Valleix Sign. No neurological sensations with compression of the area of Jacques's Nerve in the area of the Abductor Hallucis muscle belly.    ORTHOPEDIC: There is moderate tenderness to palpation of the area around the plantar medial calcaneal tubercle on the right foot. Pains are characterized as sharp and stabbing-like with direct palpation of the area. There is also mild pain to palpation of the immediate plantar aspect of the heel, and no pains to the lateral band of the fascia. No edema is noted. No fullness is noted. There are mild pains to palpation within the plantar fascia at the arch. No defects are noted within the plantar fascia at the arch. No plantar fibromas are noted. No defects are noted within the ligament. Dorsiflexion of the " MTPJs with simultaneous palpation of the fascia at the arch, does not worsen and exacerbate the pains. Plantarflexion of the MTPJs with simultaneous palpation of the fascia at the arch, does worsen and exacerbate the pains.  No pains with medial to lateral compression of the heel. Equinus contracture is noted. Antalgic gait pattern is noted.     Assessment:     1. Plantar fasciitis    2. Pain of right heel    3. Heel spur, right    4. DDD (degenerative disc disease), lumbar    5. Lumbar radiculopathy          Plan:     Plantar fasciitis  -     Nerve conduction test; Future; Expected date: 05/16/2024    Pain of right heel    Heel spur, right    DDD (degenerative disc disease), lumbar  -     Nerve conduction test; Future; Expected date: 05/16/2024    Lumbar radiculopathy  -     gabapentin (NEURONTIN) 300 MG capsule; Take 1 capsule (300 mg total) by mouth every evening.  Dispense: 30 capsule; Refill: 0  -     Nerve conduction test; Future; Expected date: 05/16/2024      Thorough discussion is had with the patient today, concerning the diagnosis, its etiology, and the treatment algorithm at present.     Lumbar Spine XRAYS are reviewed in detail with the patient. All questions and concerns regarding findings and its/their implications are outlined and discussed.    Foot XRAYS are reviewed in detail with the patient. All questions and concerns regarding findings and its/their implications are outlined and discussed.    Has failed CSI to the RLE x2.    States nocturnal nerve like pains to the LE.      As such, we will obtain EMG/NCV study to rule out nerve compression proximally or the tarsal tunnel.    We will forego CSI at this time.    Did discuss proper and supportive shoe gear in detail and at length with the patient.  These are shoes with firm and robust arch support; medial counter.  Shoes which only bend at the metatarsophalangeal joint and which are rigid in the midfoot and hindfoot. Patient urged to purchase  running type or cross training type shoes gear which are designed for pronation control.     Patient is educated as to the use and the effectiveness of Gabapentin, as well as the potential side effects, which may include, but is not limited to, mood or behavior changes, anxiety, depression, feelings of agitation or hostility or restlessness, hyperactive (mentally or physically), thoughts of suicide or hurting oneself, increased seizures, fever, swollen glands, body aches, flu symptoms, drowsiness, skin rash, easy bruising or bleeding, severe tingling, numbness, pain, muscle weakness, upper stomach pain, loss of appetite, dark urine, jaundice (yellowing of the skin or eyes), chest pain, irregular heart rhythm, feeling short of breath, confusion, nausea and vomiting, swelling, rapid weight gain, urinating less than usual or not at all, new or worsening cough, fever, trouble breathing, rapid back and forth movement of your eyes. With the initial dosage, please take at night prior to bedtime until getting used to its potential drowsy effect.           Future Appointments   Date Time Provider Department Center   4/18/2024 11:40 AM Jaci Perdue PA-C [facility]   10/9/2024 10:40 AM Rebecca Christensen DO [facility]

## 2024-04-18 ENCOUNTER — OFFICE VISIT (OUTPATIENT)
Dept: INTERNAL MEDICINE | Facility: CLINIC | Age: 59
End: 2024-04-18
Payer: COMMERCIAL

## 2024-04-18 VITALS — DIASTOLIC BLOOD PRESSURE: 80 MMHG | SYSTOLIC BLOOD PRESSURE: 130 MMHG

## 2024-04-18 DIAGNOSIS — I10 ESSENTIAL HYPERTENSION: ICD-10-CM

## 2024-04-18 DIAGNOSIS — F41.9 ANXIETY: Primary | ICD-10-CM

## 2024-04-18 PROCEDURE — 3044F HG A1C LEVEL LT 7.0%: CPT | Mod: CPTII,95,, | Performed by: PHYSICIAN ASSISTANT

## 2024-04-18 PROCEDURE — 1160F RVW MEDS BY RX/DR IN RCRD: CPT | Mod: CPTII,95,, | Performed by: PHYSICIAN ASSISTANT

## 2024-04-18 PROCEDURE — 99214 OFFICE O/P EST MOD 30 MIN: CPT | Mod: 95,,, | Performed by: PHYSICIAN ASSISTANT

## 2024-04-18 PROCEDURE — 1159F MED LIST DOCD IN RCRD: CPT | Mod: CPTII,95,, | Performed by: PHYSICIAN ASSISTANT

## 2024-04-18 PROCEDURE — 3079F DIAST BP 80-89 MM HG: CPT | Mod: CPTII,95,, | Performed by: PHYSICIAN ASSISTANT

## 2024-04-18 PROCEDURE — 3075F SYST BP GE 130 - 139MM HG: CPT | Mod: CPTII,95,, | Performed by: PHYSICIAN ASSISTANT

## 2024-04-18 RX ORDER — SERTRALINE HYDROCHLORIDE 25 MG/1
25 TABLET, FILM COATED ORAL DAILY
Qty: 90 TABLET | Refills: 1 | Status: SHIPPED | OUTPATIENT
Start: 2024-04-18 | End: 2025-04-18

## 2024-04-18 NOTE — PROGRESS NOTES
Subjective:      Patient ID: Lia Ragsdale is a 58 y.o. female.    Chief Complaint: No chief complaint on file.    The patient location is: [place]   The chief complaint leading to consultation is: med follow up     Visit type: audiovisual    Face to Face time with patient: 11:57-12:03  10 minutes of total time spent on the encounter, which includes face to face time and non-face to face time preparing to see the patient (eg, review of tests), Obtaining and/or reviewing separately obtained history, Documenting clinical information in the electronic or other health record, Independently interpreting results (not separately reported) and communicating results to the patient/family/caregiver, or Care coordination (not separately reported).     Each patient to whom he or she provides medical services by telemedicine is:  (1) informed of the relationship between the physician and patient and the respective role of any other health care provider with respect to management of the patient; and (2) notified that he or she may decline to receive medical services by telemedicine and may withdraw from such care at any time.    Notes:  Patient is known to me, being seen today anxiety follow up.      Anxiety- zoloft 25mg, buspar 5mg bid prn   Feels zoloft working very well, has not needed buspar   Initially w some nausea but that has resolved     Last visit March 2024 with myself, started on zoloft at that time         Review of Systems   Constitutional:  Negative for chills, diaphoresis and fever.   HENT:  Negative for congestion, rhinorrhea and sore throat.    Respiratory:  Negative for cough, shortness of breath and wheezing.    Gastrointestinal:  Negative for abdominal pain, constipation, diarrhea, nausea and vomiting.   Skin:  Negative for rash.   Neurological:  Negative for dizziness, light-headedness and headaches.   Psychiatric/Behavioral:  The patient is not nervous/anxious.        Objective:   /80 Comment:  Last visit  LMP 07/18/2016   Physical Exam  Constitutional:       General: She is not in acute distress.     Appearance: She is well-developed. She is not ill-appearing or diaphoretic.   HENT:      Head: Normocephalic and atraumatic.      Right Ear: External ear normal.      Left Ear: External ear normal.   Eyes:      General: Lids are normal.         Right eye: No discharge.         Left eye: No discharge.      Conjunctiva/sclera: Conjunctivae normal.      Right eye: Right conjunctiva is not injected.      Left eye: Left conjunctiva is not injected.   Pulmonary:      Effort: Pulmonary effort is normal. No respiratory distress.   Skin:     General: Skin is warm and dry.      Findings: No rash.   Neurological:      Mental Status: She is alert and oriented to person, place, and time.   Psychiatric:         Speech: Speech normal.         Behavior: Behavior normal.         Thought Content: Thought content normal.         Judgment: Judgment normal.       Assessment:      1. Anxiety    2. Essential hypertension       Plan:   Anxiety  -     sertraline (ZOLOFT) 25 MG tablet; Take 1 tablet (25 mg total) by mouth once daily.  Dispense: 90 tablet; Refill: 1    Essential hypertension   Controlled, continue to monitor at home      Continue current regimen     F/u 6mths as scheduled     Discussed worsening signs/symptoms and when to return to clinic or go to ED.   Patient expresses understanding and agrees with treatment plan.

## 2024-04-24 ENCOUNTER — PATIENT MESSAGE (OUTPATIENT)
Dept: PODIATRY | Facility: CLINIC | Age: 59
End: 2024-04-24
Payer: COMMERCIAL

## 2024-04-24 ENCOUNTER — OFFICE VISIT (OUTPATIENT)
Dept: PHYSICAL MEDICINE AND REHAB | Facility: CLINIC | Age: 59
End: 2024-04-24
Payer: COMMERCIAL

## 2024-04-24 VITALS — BODY MASS INDEX: 30.38 KG/M2 | RESPIRATION RATE: 13 BRPM | WEIGHT: 177.94 LBS | HEIGHT: 64 IN

## 2024-04-24 DIAGNOSIS — M54.16 LUMBAR RADICULOPATHY: Primary | ICD-10-CM

## 2024-04-24 PROCEDURE — 95885 MUSC TST DONE W/NERV TST LIM: CPT | Mod: S$GLB,,, | Performed by: PHYSICAL MEDICINE & REHABILITATION

## 2024-04-24 PROCEDURE — 99999 PR PBB SHADOW E&M-EST. PATIENT-LVL III: CPT | Mod: PBBFAC,,, | Performed by: PHYSICAL MEDICINE & REHABILITATION

## 2024-04-24 PROCEDURE — 99499 UNLISTED E&M SERVICE: CPT | Mod: S$GLB,,, | Performed by: PHYSICAL MEDICINE & REHABILITATION

## 2024-04-24 PROCEDURE — 95909 NRV CNDJ TST 5-6 STUDIES: CPT | Mod: S$GLB,,, | Performed by: PHYSICAL MEDICINE & REHABILITATION

## 2024-04-24 NOTE — PROGRESS NOTES
[facility]   [id number]  [place] [address]  Phone: [phone]        Full Name: Lia Ragsdale YOB: 1965  Patient ID: [id number]      Visit Date: 4/24/2024 10:17  Age: 58 Years 11 Months Old  Examining Physician: Karmen Victor M.D.  Referring Physician: Dr Nicolas  Reason for Referral: lower ext      Chief Complaint   Patient presents with    Leg Pain    Foot Pain       HPI: This is a 58 y.o.  female being seen in clinic today for evaluation of chronic right foot achy pain and numbness. She has been treated for plantar fasciitis with injections, but has sharp pain on the right lateral side of her foot and sole.  She has symptoms with walking and it can be at night.  Stretching provides some relief.    History obtained from patient    Past family, medical, social, and surgical history reviewed in chart    Review of Systems:     General- denies lethargy, weight change, fever, chills  Head/neck- denies swallowing difficulties  ENT- denies hearing changes  Cardiovascular-denies chest pain  Pulmonary- denies shortness of breath  GI- denies constipation or bowel incontinence  - denies bladder incontinence  Skin- denies wounds or rashes  Musculoskeletal- denies weakness, + pain  Neurologic- + numbness and tingling  Psychiatric- denies depressive or psychotic features, denies anxiety  Lymphatic-denies swelling  Endocrine- denies hypoglycemic symptoms/DM history  All other pertinent systems negative     Physical Examination:  General: Well developed, well nourished female, NAD  HEENT:NCAT EOMI bilaterally   Pulmonary:Normal respirations    Spinal Examination: CERVICAL  Active ROM is within normal limits.  Inspection: No deformity of spinal alignment.    Spinal Examination: LUMBAR or THORACIC  Active ROM is within normal limits.  Inspection: No deformity of spinal alignment.    Slr neg bilaterally    Bilateral Upper and Lower Extremities:  Pulses are 2+ at radial  bilaterally.  Shoulder/Elbow/Wrist/Hand ROM   Hip/Knee/Ankle ROM wnl  Bilateral Extremities show normal capillary refill.  No signs of cyanosis, rubor, edema, skin changes, or dysvascular changes of appendages.  Nails appear intact.    Neurological Exam:  Cranial Nerves:  II-XII grossly intact    Manual Muscle Testing: (Motor 5=normal)  5/5 strength bilateral lower extremities    No focal atrophy is noted of either  lower extremity.    Bilateral Reflexes:  No clonus at knee or ankle.    Sensation: tested to light touch  - intact in legs    Gait: Narrow base and good arm swing.      Entire procedure explained to patient prior to proceeding.  Verbal consent obtained      SNC      Nerve / Sites Rec. Site Onset Lat Peak Lat Amp Segments Distance Peak Diff Velocity     ms ms µV  mm ms m/s   R Sural - Ankle (Calf)      Calf Ankle 2.6 3.5 17.3 Calf - Ankle 140  54   R Superficial peroneal - Ankle      Lat leg Ankle 2.3 2.8 14.1 Lat leg - Ankle 140  61   R Medial plantar, Lateral plantar - Ankle (Medial, lateral sole)      Medial plantar 1  2.2 3.1 22.5 Medial plantar 1 - G1 140  63      Medial plantar 2  2.0 3.1 13.6 Medial plantar 2 - 1 140 0.0 672       MNC      Nerve / Sites Muscle Latency Amplitude Duration Rel Amp Segments Distance Lat Diff Velocity     ms mV ms %  mm ms m/s   R Peroneal - EDB      Ankle EDB 4.3 3.5 6.9 100 Ankle - EDB 80        Fibular head EDB 10.2 3.2 7.5 92.4 Fibular head - Ankle 320 5.8 55      Pop fossa EDB 11.7 3.2 7.0 98.5 Pop fossa - Fibular head 80 1.5 53   R Tibial - AH      Ankle AH 4.1 6.2 5.2 100 Ankle - Ankle 80        Pop fossa AH 12.8 6.1 5.7 99.3 Pop fossa - Ankle 390 8.8 45       EMG         EMG Summary Table     Spontaneous MUAP Recruitment   Muscle IA Fib PSW Fasc Other Dur. Dur Amp Dur Polys Pattern Effort   R. Rectus femoris N None None None . _NFT_ _NFT_ N N N N Max   R. Extensor digitorum brevis N None None None . _NFT_ _NFT_ Sl Incr N 1+ Reduced Max   R. Abductor hallucis  Incr None None None . _NFT_ _NFT_ N N 1+ sl red Max           INTERPRETATION  -Right superficial peroneal sensory nerve conduction study showed normal peak latency and amplitude  -Right sural sensory nerve conduction study showed normal peak latency and amplitude  -Right peroneal motor nerve conduction study showed normal latency, amplitude, and conduction velocity  -Right tibial motor nerve conduction study showed normal latency, amplitude, and conduction velocity  -right medial and lateral plantar sensory showed normal peak latency and amplitude  -Needle EMG examination performed to above mentioned muscles       IMPRESSION  ABNORMAL study  2. There is electrodiagnostic evidence of a mild subacute on chronic radiculopathy of the S1 nerve root and a mild chronic radiculopathy of the L5 nerve root. There was no evidence of tarsal tunnel syndrome.    PLAN  Discussed in detail for greater than 30 minutes about diagnosis and treatment plan    1. Follow up with referring provider: Dr. Shawn Nicolas  2. Handouts on lumbar radic, back exercises provided. Rec referral to PT and IPM  3. This study is good for one year. If symptoms worsen or do not improve, please re-consult.    Karmen Victor M.D.  Physical Medicine and Rehab

## 2024-05-12 DIAGNOSIS — M54.16 LUMBAR RADICULOPATHY: ICD-10-CM

## 2024-05-12 RX ORDER — GABAPENTIN 300 MG/1
300 CAPSULE ORAL NIGHTLY
Qty: 30 CAPSULE | Refills: 0 | Status: SHIPPED | OUTPATIENT
Start: 2024-05-12 | End: 2024-06-07

## 2024-05-28 NOTE — TELEPHONE ENCOUNTER
Care Due:                  Date            Visit Type   Department     Provider  --------------------------------------------------------------------------------                                EP -                              PRIMARY      [facility] INTERNAL  Last Visit: 04-      [facility]   MEDICINE       Romanajuan Rivrea  Next Visit: None Scheduled  None         None Found                                                            Last  Test          Frequency    Reason                     Performed    Due Date  --------------------------------------------------------------------------------    CBC.........  12 months..  valACYclovir.............  Not Found    Overdue    [employer] Embedded Care Gaps. Reference number: [id number]. 11/03/2022   3:04:54 AM CDT   (1) Outpatient Area

## 2024-06-07 DIAGNOSIS — M54.16 LUMBAR RADICULOPATHY: ICD-10-CM

## 2024-06-07 RX ORDER — GABAPENTIN 300 MG/1
300 CAPSULE ORAL NIGHTLY
Qty: 30 CAPSULE | Refills: 0 | Status: SHIPPED | OUTPATIENT
Start: 2024-06-07

## 2024-07-11 DIAGNOSIS — M54.16 LUMBAR RADICULOPATHY: ICD-10-CM

## 2024-07-11 RX ORDER — GABAPENTIN 300 MG/1
300 CAPSULE ORAL NIGHTLY
Qty: 30 CAPSULE | Refills: 0 | Status: SHIPPED | OUTPATIENT
Start: 2024-07-11

## 2024-07-12 ENCOUNTER — PATIENT MESSAGE (OUTPATIENT)
Dept: PODIATRY | Facility: CLINIC | Age: 59
End: 2024-07-12
Payer: COMMERCIAL

## 2024-07-16 ENCOUNTER — PATIENT MESSAGE (OUTPATIENT)
Dept: PODIATRY | Facility: CLINIC | Age: 59
End: 2024-07-16

## 2024-07-16 ENCOUNTER — OFFICE VISIT (OUTPATIENT)
Dept: PODIATRY | Facility: CLINIC | Age: 59
End: 2024-07-16
Payer: COMMERCIAL

## 2024-07-16 VITALS — HEIGHT: 64 IN | WEIGHT: 177.94 LBS | BODY MASS INDEX: 30.38 KG/M2

## 2024-07-16 DIAGNOSIS — M72.2 PLANTAR FASCIITIS: ICD-10-CM

## 2024-07-16 DIAGNOSIS — M51.36 DDD (DEGENERATIVE DISC DISEASE), LUMBAR: ICD-10-CM

## 2024-07-16 DIAGNOSIS — M77.31 HEEL SPUR, RIGHT: ICD-10-CM

## 2024-07-16 DIAGNOSIS — M54.16 LUMBAR RADICULOPATHY: Primary | ICD-10-CM

## 2024-07-16 DIAGNOSIS — M25.571 PAIN IN RIGHT ANKLE AND JOINTS OF RIGHT FOOT: ICD-10-CM

## 2024-07-16 PROCEDURE — 3008F BODY MASS INDEX DOCD: CPT | Mod: CPTII,S$GLB,, | Performed by: PODIATRIST

## 2024-07-16 PROCEDURE — 1159F MED LIST DOCD IN RCRD: CPT | Mod: CPTII,S$GLB,, | Performed by: PODIATRIST

## 2024-07-16 PROCEDURE — 99214 OFFICE O/P EST MOD 30 MIN: CPT | Mod: S$GLB,,, | Performed by: PODIATRIST

## 2024-07-16 PROCEDURE — 99999 PR PBB SHADOW E&M-EST. PATIENT-LVL III: CPT | Mod: PBBFAC,,, | Performed by: PODIATRIST

## 2024-07-16 PROCEDURE — 1160F RVW MEDS BY RX/DR IN RCRD: CPT | Mod: CPTII,S$GLB,, | Performed by: PODIATRIST

## 2024-07-16 PROCEDURE — 3044F HG A1C LEVEL LT 7.0%: CPT | Mod: CPTII,S$GLB,, | Performed by: PODIATRIST

## 2024-07-16 RX ORDER — DICLOFENAC SODIUM 75 MG/1
75 TABLET, DELAYED RELEASE ORAL 2 TIMES DAILY
Qty: 60 TABLET | Refills: 1 | Status: SHIPPED | OUTPATIENT
Start: 2024-07-16 | End: 2024-08-15

## 2024-07-16 RX ORDER — HYDROCODONE BITARTRATE AND ACETAMINOPHEN 5; 325 MG/1; MG/1
1 TABLET ORAL EVERY 6 HOURS PRN
Qty: 28 TABLET | Refills: 0 | Status: SHIPPED | OUTPATIENT
Start: 2024-07-16 | End: 2024-07-23

## 2024-07-16 NOTE — PROGRESS NOTES
Subjective:       Patient ID: Lia Ragsdale is a 59 y.o. female.    Chief Complaint: Foot Pain (Foot pain, rates pain 0, nondiabetic )      HPI: Lia Ragsdale presents to the office today, for follow-up concerning plantar fasciitis of the right foot. States her pains are mostly to the plantar lateral foot. At this time, pains are 3/10 and are described as moderate in nature. States analgic gait pattern at time. Does state nocturnal pains. To this juncture, patient has had 2 cortisone injections to the right foot. States NSAIDs. Patient's Primary Care Provider is Rebecca Christensen DO. States nocturnal foot pains that often wake her up at night. Does have a Hx of lumbar DDD. Does take Gabapentin.     Review of patient's allergies indicates:  No Known Allergies    Past Medical History:   Diagnosis Date    Bladder instability 01/13/2015    Depression     History of depression in the past. Pt states it was from a stressful job.    Gallstones     General anesthetics causing adverse effect in therapeutic use     had respiratory distress after propofol    HPV (human papilloma virus) anogenital infection     Hypertension     Peptic ulcer disease        Family History   Problem Relation Name Age of Onset    Colon cancer Father Duarte     Arthritis Father Duarte     Diabetes Mother Ramona Quezada     Hypertension Mother Ramona Quezada     Arthritis Mother Ramona Quezada     Hypertension Sister Ann Marie Chowdhury     Breast cancer Sister Ann Marie Chowdhury 54        Stage 1    Cancer Sister Ann Marie Chowdhury        Social History     Socioeconomic History    Marital status:    Tobacco Use    Smoking status: Never    Smokeless tobacco: Never   Substance and Sexual Activity    Alcohol use: Yes     Alcohol/week: 6.0 standard drinks of alcohol     Types: 6 Cans of beer per week     Comment: Socially drinks    Drug use: No    Sexual activity: Yes     Partners: Male     Birth control/protection: None     Comment: Tubes Tied     Social  Determinants of Health     Financial Resource Strain: Low Risk  (2023)    Overall Financial Resource Strain (CARDIA)     Difficulty of Paying Living Expenses: Not hard at all   Food Insecurity: No Food Insecurity (2023)    Hunger Vital Sign     Worried About Running Out of Food in the Last Year: Never true     Ran Out of Food in the Last Year: Never true   Transportation Needs: No Transportation Needs (2023)    PRAPARE - Transportation     Lack of Transportation (Medical): No     Lack of Transportation (Non-Medical): No   Physical Activity: Inactive (2023)    Exercise Vital Sign     Days of Exercise per Week: 3 days     Minutes of Exercise per Session: 0 min   Stress: Stress Concern Present (2023)    [place] [employer] - Occupational Stress Questionnaire     Feeling of Stress : Very much   Housing Stability: Low Risk  (2023)    Housing Stability Vital Sign     Unable to Pay for Housing in the Last Year: No     Number of Places Lived in the Last Year: 1     Unstable Housing in the Last Year: No       Past Surgical History:   Procedure Laterality Date     SECTION, LOW TRANSVERSE      x 1    CHOLECYSTECTOMY      COLONOSCOPY N/A 2021    Procedure: COLONOSCOPY;  Surgeon: Rebecca Willoughby MD;  Location: [facility];  Service: Endoscopy;  Laterality: N/A;    TUBAL LIGATION         Review of Systems   Constitutional:  Negative for chills, fatigue and fever.   HENT:  Negative for hearing loss.    Eyes:  Negative for photophobia and visual disturbance.   Respiratory:  Negative for cough, chest tightness, shortness of breath and wheezing.    Cardiovascular:  Negative for chest pain and palpitations.   Gastrointestinal:  Negative for constipation, diarrhea, nausea and vomiting.   Endocrine: Negative for cold intolerance and heat intolerance.   Genitourinary:  Negative for flank pain.   Musculoskeletal:  Positive for gait problem. Negative for neck pain and neck  "stiffness.   Neurological:  Negative for light-headedness and headaches.        Neuritis    Psychiatric/Behavioral:  Negative for sleep disturbance.          Objective:   Ht 5' 4" (1.626 m)   Wt 80.7 kg (177 lb 14.6 oz)   LMP 07/18/2016   BMI 30.54 kg/m²     X-Ray Foot Complete 3 view Right  Narrative: EXAMINATION:  XR FOOT COMPLETE 3 VIEW RIGHT    CLINICAL HISTORY:  . Pain in right foot    TECHNIQUE:  AP, lateral, and oblique views of the right foot were performed.    COMPARISON:  None    FINDINGS:  No fracture dislocation.  Degenerative changes are identified of the 1st MTP joint space.  No significant soft tissue swelling.  Impression: Degenerative changes without fracture or dislocation.    Electronically signed by: Elvis Calixto MD  Date:    01/05/2024  Time:    15:17      Physical Exam    LOWER EXTREMITY PHYSICAL EXAMINATION    NEUROLOGY: Negative Tinel's Sign and negative Valleix Sign. No neurological sensations with compression of the area of Jacques's Nerve in the area of the Abductor Hallucis muscle belly.    ORTHOPEDIC: There is no tenderness to palpation of the area around the plantar medial calcaneal tubercle on the right foot. There is also no pain to palpation of the immediate plantar aspect of the heel, and mild pains to the lateral band of the fascia. Pains are characterized as sharp and stabbing-like with direct palpation of the area. No edema is noted. No fullness is noted. There are mild pains to palpation within the plantar fascia at the arch. No defects are noted within the plantar fascia at the arch. No plantar fibromas are noted. No defects are noted within the ligament. Dorsiflexion of the MTPJs with simultaneous palpation of the fascia at the arch, does no worsen and exacerbate the pains. Plantarflexion of the MTPJs with simultaneous palpation of the fascia at the arch, does worsen and exacerbate the pains.  No pains with medial to lateral compression of the heel. Equinus contracture is " noted. Antalgic gait pattern is noted.       Assessment:     1. Lumbar radiculopathy    2. DDD (degenerative disc disease), lumbar    3. Pain in right ankle and joints of right foot    4. Plantar fasciitis    5. Heel spur, right            Plan:     Lumbar radiculopathy    DDD (degenerative disc disease), lumbar  -     HYDROcodone-acetaminophen (NORCO) 5-325 mg per tablet; Take 1 tablet by mouth every 6 (six) hours as needed for Pain.  Dispense: 28 tablet; Refill: 0  -     diclofenac (VOLTAREN) 75 MG EC tablet; Take 1 tablet (75 mg total) by mouth 2 (two) times daily.  Dispense: 60 tablet; Refill: 1    Pain in right ankle and joints of right foot  -     MRI Foot ([place]) Right Without Contrast; Future; Expected date: 07/16/2024  -     HYDROcodone-acetaminophen (NORCO) 5-325 mg per tablet; Take 1 tablet by mouth every 6 (six) hours as needed for Pain.  Dispense: 28 tablet; Refill: 0  -     diclofenac (VOLTAREN) 75 MG EC tablet; Take 1 tablet (75 mg total) by mouth 2 (two) times daily.  Dispense: 60 tablet; Refill: 1    Plantar fasciitis  -     MRI Foot ([place]) Right Without Contrast; Future; Expected date: 07/16/2024  -     HYDROcodone-acetaminophen (NORCO) 5-325 mg per tablet; Take 1 tablet by mouth every 6 (six) hours as needed for Pain.  Dispense: 28 tablet; Refill: 0    Heel spur, right  -     MRI Foot ([place]foot) Right Without Contrast; Future; Expected date: 07/16/2024        Thorough discussion is had with the patient today, concerning the diagnosis, its etiology, and the treatment algorithm at present.     Pains are recalcitrant to CAM Walker and PT/OT, as well as Gabapentin and NSAIDS.    As such, obtain MRI to assess for plantar fasciitis.    EMG/NCV is reviewed in detail with the patient. All questions and concerns regarding findings and its/their implications are outlined and discussed.    Continue Gabapentin.           Future Appointments   Date Time Provider Department Center   10/9/2024 10:40 AM  [facility]

## 2024-07-30 DIAGNOSIS — M54.16 LUMBAR RADICULOPATHY: ICD-10-CM

## 2024-07-30 RX ORDER — GABAPENTIN 300 MG/1
300 CAPSULE ORAL NIGHTLY
Qty: 30 CAPSULE | Refills: 0 | Status: SHIPPED | OUTPATIENT
Start: 2024-07-30 | End: 2024-08-01

## 2024-07-31 ENCOUNTER — PATIENT MESSAGE (OUTPATIENT)
Dept: PODIATRY | Facility: CLINIC | Age: 59
End: 2024-07-31
Payer: COMMERCIAL

## 2024-08-01 DIAGNOSIS — M54.16 LUMBAR RADICULOPATHY: ICD-10-CM

## 2024-08-01 RX ORDER — GABAPENTIN 400 MG/1
400 CAPSULE ORAL DAILY
Qty: 60 CAPSULE | Refills: 2 | Status: SHIPPED | OUTPATIENT
Start: 2024-08-01 | End: 2024-09-30

## 2024-10-09 ENCOUNTER — OFFICE VISIT (OUTPATIENT)
Dept: INTERNAL MEDICINE | Facility: CLINIC | Age: 59
End: 2024-10-09
Payer: COMMERCIAL

## 2024-10-09 ENCOUNTER — OFFICE VISIT (OUTPATIENT)
Dept: OBSTETRICS AND GYNECOLOGY | Facility: CLINIC | Age: 59
End: 2024-10-09
Payer: COMMERCIAL

## 2024-10-09 VITALS
BODY MASS INDEX: 29.74 KG/M2 | WEIGHT: 174.19 LBS | SYSTOLIC BLOOD PRESSURE: 138 MMHG | OXYGEN SATURATION: 97 % | DIASTOLIC BLOOD PRESSURE: 82 MMHG | HEIGHT: 64 IN | HEART RATE: 60 BPM | TEMPERATURE: 98 F | RESPIRATION RATE: 20 BRPM

## 2024-10-09 VITALS
BODY MASS INDEX: 29.92 KG/M2 | HEIGHT: 64 IN | DIASTOLIC BLOOD PRESSURE: 70 MMHG | WEIGHT: 175.25 LBS | SYSTOLIC BLOOD PRESSURE: 124 MMHG

## 2024-10-09 DIAGNOSIS — Z01.419 ENCOUNTER FOR GYNECOLOGICAL EXAMINATION WITHOUT ABNORMAL FINDING: Primary | ICD-10-CM

## 2024-10-09 DIAGNOSIS — Z12.4 PAP SMEAR FOR CERVICAL CANCER SCREENING: ICD-10-CM

## 2024-10-09 DIAGNOSIS — R73.03 PREDIABETES: ICD-10-CM

## 2024-10-09 DIAGNOSIS — I10 ESSENTIAL HYPERTENSION: ICD-10-CM

## 2024-10-09 DIAGNOSIS — Z00.00 ANNUAL PHYSICAL EXAM: Primary | ICD-10-CM

## 2024-10-09 DIAGNOSIS — E78.00 HYPERCHOLESTEROLEMIA: ICD-10-CM

## 2024-10-09 DIAGNOSIS — M72.2 PLANTAR FASCIITIS OF RIGHT FOOT: ICD-10-CM

## 2024-10-09 DIAGNOSIS — Z78.0 MENOPAUSE: ICD-10-CM

## 2024-10-09 DIAGNOSIS — K21.9 CHRONIC GERD: ICD-10-CM

## 2024-10-09 DIAGNOSIS — F41.9 ANXIETY: ICD-10-CM

## 2024-10-09 PROCEDURE — 3044F HG A1C LEVEL LT 7.0%: CPT | Mod: CPTII,S$GLB,, | Performed by: OBSTETRICS & GYNECOLOGY

## 2024-10-09 PROCEDURE — 3075F SYST BP GE 130 - 139MM HG: CPT | Mod: CPTII,S$GLB,, | Performed by: INTERNAL MEDICINE

## 2024-10-09 PROCEDURE — 1160F RVW MEDS BY RX/DR IN RCRD: CPT | Mod: CPTII,S$GLB,, | Performed by: INTERNAL MEDICINE

## 2024-10-09 PROCEDURE — 1159F MED LIST DOCD IN RCRD: CPT | Mod: CPTII,S$GLB,, | Performed by: INTERNAL MEDICINE

## 2024-10-09 PROCEDURE — 3078F DIAST BP <80 MM HG: CPT | Mod: CPTII,S$GLB,, | Performed by: OBSTETRICS & GYNECOLOGY

## 2024-10-09 PROCEDURE — 99396 PREV VISIT EST AGE 40-64: CPT | Mod: S$GLB,,, | Performed by: OBSTETRICS & GYNECOLOGY

## 2024-10-09 PROCEDURE — 3079F DIAST BP 80-89 MM HG: CPT | Mod: CPTII,S$GLB,, | Performed by: INTERNAL MEDICINE

## 2024-10-09 PROCEDURE — 3008F BODY MASS INDEX DOCD: CPT | Mod: CPTII,S$GLB,, | Performed by: INTERNAL MEDICINE

## 2024-10-09 PROCEDURE — 3074F SYST BP LT 130 MM HG: CPT | Mod: CPTII,S$GLB,, | Performed by: OBSTETRICS & GYNECOLOGY

## 2024-10-09 PROCEDURE — 99999 PR PBB SHADOW E&M-EST. PATIENT-LVL III: CPT | Mod: PBBFAC,,, | Performed by: INTERNAL MEDICINE

## 2024-10-09 PROCEDURE — 99396 PREV VISIT EST AGE 40-64: CPT | Mod: S$GLB,,, | Performed by: INTERNAL MEDICINE

## 2024-10-09 PROCEDURE — 99999 PR PBB SHADOW E&M-EST. PATIENT-LVL III: CPT | Mod: PBBFAC,,, | Performed by: OBSTETRICS & GYNECOLOGY

## 2024-10-09 PROCEDURE — 3044F HG A1C LEVEL LT 7.0%: CPT | Mod: CPTII,S$GLB,, | Performed by: INTERNAL MEDICINE

## 2024-10-09 PROCEDURE — 3008F BODY MASS INDEX DOCD: CPT | Mod: CPTII,S$GLB,, | Performed by: OBSTETRICS & GYNECOLOGY

## 2024-10-09 RX ORDER — SERTRALINE HYDROCHLORIDE 25 MG/1
25 TABLET, FILM COATED ORAL DAILY
Qty: 90 TABLET | Refills: 3 | Status: SHIPPED | OUTPATIENT
Start: 2024-10-09

## 2024-10-09 RX ORDER — AMLODIPINE BESYLATE 2.5 MG/1
2.5 TABLET ORAL DAILY
Qty: 90 TABLET | Refills: 3 | Status: SHIPPED | OUTPATIENT
Start: 2024-10-09

## 2024-10-09 RX ORDER — DICLOFENAC SODIUM 75 MG/1
75 TABLET, DELAYED RELEASE ORAL 2 TIMES DAILY
Qty: 60 TABLET | Refills: 5 | Status: SHIPPED | OUTPATIENT
Start: 2024-10-09

## 2024-10-09 RX ORDER — PANTOPRAZOLE SODIUM 40 MG/1
40 TABLET, DELAYED RELEASE ORAL DAILY
Qty: 90 TABLET | Refills: 3 | Status: SHIPPED | OUTPATIENT
Start: 2024-10-09

## 2024-10-09 NOTE — PROGRESS NOTES
"  Subjective:       Patient ID: Lia Ragsdale is a 59 y.o. female.    Chief Complaint:  Gynecologic Exam      History of Present Illness  Gynecologic Exam      Patient presents to day for annual exam , postmenopausal.   No gyn complaints, no vaginal bleeding or pelvic pain noted.   Hormonal therapy reviewed and discussed.   Preventive screening exam indication and testing reviewed and discussed.  Pap is due     Health Maintenance   Topic Date Due    Mammogram  10/13/2024    Shingles Vaccine (1 of 2) 2025 (Originally 2015)    Hepatitis C Screening  2029 (Originally 1965)    Colorectal Cancer Screening  2026    TETANUS VACCINE  2027    Lipid Panel  2029     GYN & OB History  Patient's last menstrual period was 2016.   Date of Last Pap: 2021    OB History    Para Term  AB Living   1 1 1     1   SAB IAB Ectopic Multiple Live Births                  # Outcome Date GA Lbr Lokesh/2nd Weight Sex Type Anes PTL Lv   1 Term      CS-Unspec          Review of Systems  Review of Systems        Objective:   /70   Ht 5' 4" (1.626 m)   Wt 79.5 kg (175 lb 4.3 oz)   LMP 2016   BMI 30.08 kg/m²    Physical Exam:   Constitutional: She appears well-developed and well-nourished. No distress.      Neck: No JVD present. No thyroid mass and no thyromegaly present.    Cardiovascular:  Normal rate and regular rhythm.                  Abdominal: Soft. Bowel sounds are normal. No hernia. Hernia confirmed negative in the ventral area, confirmed negative in the right inguinal area and confirmed negative in the left inguinal area.     Genitourinary:    Vagina, uterus and rectum normal.     Labial bartholins normal.There is no rash, tenderness, lesion or injury on the right labia. There is no rash, tenderness, lesion or injury on the left labia. Cervix is normal. Right adnexum displays no mass, no tenderness and no fullness. Left adnexum displays no mass, no tenderness and " no fullness. No erythema, vaginal discharge, tenderness or bleeding in the vagina.    No foreign body in the vagina.   Vaginal atrophy noted. Cervix exhibits no motion tenderness, no discharge and no friability.    pap smear completedUterus is not deviated, not enlarged, not fixed and not tender.                      Assessment:        1. Encounter for gynecological examination without abnormal finding    2. Menopause    3. Pap smear for cervical cancer screening                Plan:            Lia was seen today for gynecologic exam.    Diagnoses and all orders for this visit:    Encounter for gynecological examination without abnormal finding    Menopause    Pap smear for cervical cancer screening  -     Liquid-Based Pap Smear, Screening  -     HPV High Risk Genotypes, PCR

## 2024-10-09 NOTE — PROGRESS NOTES
Lia Ragsdale  59 y.o. White female  [id number]    Chief Complaint:  Chief Complaint   Patient presents with    Annual Exam       History of Present Illness:  History of Present Illness    CHIEF COMPLAINT:  Ms. Ragsdale presents today for annual visit and to discuss ongoing plantar fasciitis.    MEDICATIONS:  She takes amlodipine for blood pressure management, reporting good blood pressure control. She started Zoloft in March for anxiety and reports significant improvement. She also takes gabapentin, pantoprazole for acid reflux, and hydrocodone as needed for pain, which helps her sleep within 20 minutes of taking it. She requests refills for amlodipine and Zoloft. She expresses uncertainty about Voltaren's effectiveness, as she still experiences pain despite taking it along with gabapentin.    PLANTAR FASCIITIS:  She reports ongoing issues with plantar fasciitis in her right foot, describing the pain level as 7-8 out of 10, particularly bothersome at night. She has difficulty getting comfortable at bedtime, taking approximately 1.5 hours to relax and fall asleep due to the pain. Treatment history includes Gabapentin 400 mg at night, injections, and a course of Voltaren pills. An MRI has confirmed the diagnosis. She denies issues with her left foot.    SLEEP:  She reports difficulty falling asleep due to pain, taking approximately 1.5 hours to get comfortable. Once asleep, she sleeps well. In the mornings, she notes staggering upon waking, which she attributes to her medication.    OCCUPATIONAL HISTORY:  She reports having a sedentary job that requires sitting all day.    FAMILY HISTORY:  Her mother had insulin-dependent diabetes and high cholesterol.    LABS AND DIAGNOSTICS:  She reports being informed of prediabetes diagnosis from labs done in March. She denies making any lifestyle changes since receiving this information. She mentions consuming coffee with sweetener prior to the appointment.    PREVENTIVE  CARE:  She has a mammogram scheduled for Monday in [place]. She plans to receive the COVID vaccine at [facility].    REVIEW OF SYSTEMS:  She denies experiencing headaches, dizziness, chest pain, trouble breathing, or coughing. She reports normal bowel movements with no black or bloody stools, and normal urination.         Review of Systems   Constitutional:  Negative for fever.   HENT:  Negative for hearing loss.    Eyes:  Negative for blurred vision.   Respiratory:  Negative for cough and shortness of breath.    Cardiovascular:  Negative for chest pain and leg swelling.   Gastrointestinal:  Negative for abdominal pain, constipation and diarrhea.   Genitourinary:  Negative for dysuria.   Neurological:  Negative for dizziness and headaches.   Psychiatric/Behavioral:  The patient is nervous/anxious and has insomnia.        Laboratory  Lab Results   Component Value Date    WBC 5.68 10/09/2023    HGB 14.0 10/09/2023    HCT 44.4 10/09/2023     10/09/2023    CHOL 223 (H) 10/09/2023    TRIG 86 03/12/2024    HDL 57 03/12/2024    ALT 15 03/12/2024    AST 12 03/12/2024     03/12/2024    K 4.4 03/12/2024     03/12/2024    CREATININE 0.8 03/12/2024    BUN 19 03/12/2024    CO2 27 10/09/2023    TSH 1.89 03/15/2022    INR 1.0 12/22/2011    HGBA1C 5.8 (H) 03/12/2024     Lab Results   Component Value Date    LDLCALC 160 (H) 03/12/2024       The following were reviewed: Active problem list, medication list, allergies, family history, social history, and Health Maintenance.     History:  Past Medical History:   Diagnosis Date    Bladder instability 01/13/2015    Depression     History of depression in the past. Pt states it was from a stressful job.    Gallstones     General anesthetics causing adverse effect in therapeutic use     had respiratory distress after propofol    HPV (human papilloma virus) anogenital infection     Hypertension     Peptic ulcer disease      Past Surgical History:   Procedure Laterality  Date     SECTION, LOW TRANSVERSE      x 1    CHOLECYSTECTOMY      COLONOSCOPY N/A 2021    Procedure: COLONOSCOPY;  Surgeon: Rebecca Willoughby MD;  Location: [facility];  Service: Endoscopy;  Laterality: N/A;    TUBAL LIGATION       Family History   Problem Relation Name Age of Onset    Colon cancer Father Duarte     Arthritis Father Duarte     Diabetes Mother Ramona Quezada     Hypertension Mother Ramona Quezada     Arthritis Mother Ramona Quezada     Hypertension Sister Ann Marie Chowdhury     Breast cancer Sister Ann Marie Chowdhury 54        Stage 1    Cancer Sister Ann Marie Chowdhury      Social History     Socioeconomic History    Marital status:    Tobacco Use    Smoking status: Never    Smokeless tobacco: Never   Substance and Sexual Activity    Alcohol use: Yes     Alcohol/week: 6.0 standard drinks of alcohol     Types: 6 Cans of beer per week     Comment: Socially drinks    Drug use: No    Sexual activity: Yes     Partners: Male     Birth control/protection: None     Comment: Tubes Tied     Social Drivers of Health     Financial Resource Strain: Low Risk  (2023)    Overall Financial Resource Strain (CARDIA)     Difficulty of Paying Living Expenses: Not hard at all   Food Insecurity: No Food Insecurity (2023)    Hunger Vital Sign     Worried About Running Out of Food in the Last Year: Never true     Ran Out of Food in the Last Year: Never true   Transportation Needs: No Transportation Needs (2023)    PRAPARE - Transportation     Lack of Transportation (Medical): No     Lack of Transportation (Non-Medical): No   Physical Activity: Inactive (2023)    Exercise Vital Sign     Days of Exercise per Week: 3 days     Minutes of Exercise per Session: 0 min   Stress: Stress Concern Present (2023)    [employer] - Occupational Stress Questionnaire     Feeling of Stress : Very much   Housing Stability: Low Risk  (2023)    Housing Stability Vital Sign     Unable to Pay  for Housing in the Last Year: No     Number of Places Lived in the Last Year: 1     Unstable Housing in the Last Year: No     Patient Active Problem List   Diagnosis    Bladder instability    Essential hypertension    Family history of colon cancer in father    Statin intolerance    Anxiety     Review of patient's allergies indicates:  No Known Allergies    Health Maintenance  Health Maintenance Topics with due status: Not Due       Topic Last Completion Date    TETANUS VACCINE 04/12/2017    Cervical Cancer Screening 07/28/2021    Colorectal Cancer Screening 11/01/2021    Hemoglobin A1c (Prediabetes) 03/12/2024    Lipid Panel 03/12/2024    RSV Vaccine (Age 60+ and Pregnant patients) Not Due     Health Maintenance Due   Topic Date Due    COVID-19 Vaccine (5 - 2024-25 season) 09/01/2024    Mammogram  10/13/2024       Medications:  Current Outpatient Medications on File Prior to Visit   Medication Sig Dispense Refill    cetirizine (ZYRTEC) 10 MG tablet Take 10 mg by mouth daily as needed.       gabapentin (NEURONTIN) 400 MG capsule Take 1 capsule (400 mg total) by mouth once daily. 60 capsule 2    triamcinolone acetonide 0.1% (KENALOG) 0.1 % cream Apply topically 2 (two) times daily. 45 g 0    amLODIPine (NORVASC) 2.5 MG tablet Take 1 tablet (2.5 mg total) by mouth once daily. 90 tablet 3    pantoprazole (PROTONIX) 40 MG tablet Take 1 tablet (40 mg total) by mouth once daily. 90 tablet 3    sertraline (ZOLOFT) 25 MG tablet Take 1 tablet (25 mg total) by mouth once daily. 90 tablet 1     Medications have been reviewed and reconciled with patient at visit today.    Exam:  Vitals:    10/09/24 1018   BP: 138/82   Pulse: 60   Resp: 20   Temp: 97.9 °F (36.6 °C)     Weight: 79 kg (174 lb 2.6 oz)   Body mass index is 29.9 kg/m².      Physical Exam    Vitals: Reviewed. Normal blood pressure.  Constitutional: No acute distress. Well-developed. Not ill-appearing.  Eyes: No scleral icterus.  Neck: No cervical  lymphadenopathy.  Cardiovascular: Normal rate and regular rhythm. Normal heart sounds.  Pulmonary: Pulmonary effort is normal. No respiratory distress. Normal breath sounds.  Abdomen: Soft. Nontender. Nondistended. Normoactive bowel sounds.  Extremities: No edema.  Skin: Warm. Dry.  Neurological: Alert and oriented to person, place, and time.  Psychiatric: Behavior normal.  Musculoskeletal: Foot pain.         Assessment:  The primary encounter diagnosis was Annual physical exam. Diagnoses of Essential hypertension, Anxiety, Chronic GERD, Prediabetes, Hypercholesterolemia, and Plantar fasciitis of right foot were also pertinent to this visit.    Assessment & Plan    ANNUAL PHYSICAL EXAM:  - Counseled regarding age appropriate screenings and immunizations  - Counseled regarding lifestyle modifications    HYPERTENSION:  - Reviewed current medication regimen including amlodipine for blood pressure and Zoloft for anxiety, noting effectiveness.  - Refilled amlodipine for blood pressure management.    ANXIETY:  - Refilled Zoloft for anxiety management.    PREDIABETES:  - Assessed prediabetes and elevated cholesterol, noting family history.    ACID REFLUX:  - Refilled pantoprazole for acid reflux.    LABS:  - Lab work ordered to be completed at [place] location.  - Complete lab work on Monday in [place], in conjunction with scheduled mammogram.    PLANTAR FASCIITIS:  - Assessed ongoing plantar fasciitis in right foot, noting previous treatment with Dr. oRy including injections and MRI confirmation.  - Evaluated current pain management regimen including gabapentin and Voltaren, noting continued elevated pain levels (7-8/10) and difficulty sleeping.  - Explained importance of stretching and icing for plantar fasciitis management.  - Discussed technique of using frozen water bottle for simultaneous icing and stretching of foot.  - Ms. Ragsdale to use frozen water bottles to roll foot over while sitting at work, providing both  ice and stretch therapy for plantar fasciitis.  - Ms. Ragsdale to continue stretching exercises for plantar fasciitis.  - Continued gabapentin 400 mg at night for plantar fasciitis pain.    FOLLOW UP:  - Follow up in 6 months.

## 2024-10-14 ENCOUNTER — HOSPITAL ENCOUNTER (OUTPATIENT)
Dept: RADIOLOGY | Facility: HOSPITAL | Age: 59
Discharge: HOME | End: 2024-10-14
Attending: INTERNAL MEDICINE
Payer: COMMERCIAL

## 2024-10-14 DIAGNOSIS — Z12.31 ENCOUNTER FOR SCREENING MAMMOGRAM FOR BREAST CANCER: ICD-10-CM

## 2024-10-14 PROCEDURE — 77067 SCR MAMMO BI INCL CAD: CPT | Mod: 26,,, | Performed by: STUDENT IN AN ORGANIZED HEALTH CARE EDUCATION/TRAINING PROGRAM

## 2024-10-14 PROCEDURE — 77063 BREAST TOMOSYNTHESIS BI: CPT | Mod: 26,,, | Performed by: STUDENT IN AN ORGANIZED HEALTH CARE EDUCATION/TRAINING PROGRAM

## 2024-10-14 PROCEDURE — 77067 SCR MAMMO BI INCL CAD: CPT | Mod: TC,PO

## 2024-10-29 ENCOUNTER — OFFICE VISIT (OUTPATIENT)
Dept: INTERNAL MEDICINE | Facility: CLINIC | Age: 59
End: 2024-10-29
Payer: COMMERCIAL

## 2024-10-29 VITALS — HEIGHT: 64 IN | BODY MASS INDEX: 30.08 KG/M2

## 2024-10-29 DIAGNOSIS — E78.5 HYPERLIPIDEMIA LDL GOAL <130: Primary | ICD-10-CM

## 2024-10-29 DIAGNOSIS — I10 ESSENTIAL HYPERTENSION: ICD-10-CM

## 2024-10-29 PROCEDURE — 1159F MED LIST DOCD IN RCRD: CPT | Mod: CPTII,95,, | Performed by: INTERNAL MEDICINE

## 2024-10-29 PROCEDURE — 3008F BODY MASS INDEX DOCD: CPT | Mod: CPTII,95,, | Performed by: INTERNAL MEDICINE

## 2024-10-29 PROCEDURE — 3044F HG A1C LEVEL LT 7.0%: CPT | Mod: CPTII,95,, | Performed by: INTERNAL MEDICINE

## 2024-10-29 PROCEDURE — 99214 OFFICE O/P EST MOD 30 MIN: CPT | Mod: 95,,, | Performed by: INTERNAL MEDICINE

## 2024-10-29 PROCEDURE — 1160F RVW MEDS BY RX/DR IN RCRD: CPT | Mod: CPTII,95,, | Performed by: INTERNAL MEDICINE

## 2024-10-29 RX ORDER — EZETIMIBE 10 MG/1
10 TABLET ORAL DAILY
Qty: 90 TABLET | Refills: 3 | Status: SHIPPED | OUTPATIENT
Start: 2024-10-29 | End: 2025-10-29

## 2024-12-12 DIAGNOSIS — L30.9 ECZEMA, UNSPECIFIED TYPE: ICD-10-CM

## 2024-12-12 NOTE — TELEPHONE ENCOUNTER
No care due was identified.  [facility] Embedded Care Due Messages. Reference number: [id number].   12/12/2024 3:52:04 PM CST

## 2024-12-12 NOTE — TELEPHONE ENCOUNTER
Refill Routing Note   Medication(s) are not appropriate for processing by [facility] for the following reason(s):        No active prescription written by provider    [facility] action(s):  Defer               Appointments  past 12m or future 3m with PCP    Date Provider   Last Visit   10/29/2024 Rebecca Christensen DO   Next Visit   Visit date not found Rebecca Christensen DO   ED visits in past 90 days: 0        Note composed:3:51 PM 12/12/2024

## 2024-12-13 RX ORDER — TRIAMCINOLONE ACETONIDE 1 MG/G
CREAM TOPICAL 2 TIMES DAILY
Qty: 45 G | Refills: 3 | Status: SHIPPED | OUTPATIENT
Start: 2024-12-13

## 2025-01-23 DIAGNOSIS — M54.16 LUMBAR RADICULOPATHY: ICD-10-CM

## 2025-01-23 RX ORDER — GABAPENTIN 400 MG/1
CAPSULE ORAL
Qty: 60 CAPSULE | Refills: 2 | Status: SHIPPED | OUTPATIENT
Start: 2025-01-23

## 2025-01-29 ENCOUNTER — LAB VISIT (OUTPATIENT)
Dept: LAB | Facility: HOSPITAL | Age: 60
End: 2025-01-29
Payer: COMMERCIAL

## 2025-01-29 DIAGNOSIS — I10 ESSENTIAL HYPERTENSION: ICD-10-CM

## 2025-01-29 DIAGNOSIS — E78.5 HYPERLIPIDEMIA LDL GOAL <130: ICD-10-CM

## 2025-01-29 LAB
ALBUMIN SERPL BCP-MCNC: 4 G/DL (ref 3.5–5.2)
ALP SERPL-CCNC: 62 U/L (ref 40–150)
ALT SERPL W/O P-5'-P-CCNC: 16 U/L (ref 10–44)
ANION GAP SERPL CALC-SCNC: 10 MMOL/L (ref 8–16)
AST SERPL-CCNC: 17 U/L (ref 10–40)
BILIRUB SERPL-MCNC: 0.5 MG/DL (ref 0.1–1)
BUN SERPL-MCNC: 15 MG/DL (ref 6–20)
CALCIUM SERPL-MCNC: 9.4 MG/DL (ref 8.7–10.5)
CHLORIDE SERPL-SCNC: 105 MMOL/L (ref 95–110)
CHOLEST SERPL-MCNC: 208 MG/DL (ref 120–199)
CHOLEST/HDLC SERPL: 3.9 {RATIO} (ref 2–5)
CO2 SERPL-SCNC: 25 MMOL/L (ref 23–29)
CREAT SERPL-MCNC: 0.8 MG/DL (ref 0.5–1.4)
EST. GFR  (NO RACE VARIABLE): >60 ML/MIN/1.73 M^2
GLUCOSE SERPL-MCNC: 91 MG/DL (ref 70–110)
HDLC SERPL-MCNC: 53 MG/DL (ref 40–75)
HDLC SERPL: 25.5 % (ref 20–50)
LDLC SERPL CALC-MCNC: 136.8 MG/DL (ref 63–159)
NONHDLC SERPL-MCNC: 155 MG/DL
POTASSIUM SERPL-SCNC: 4.2 MMOL/L (ref 3.5–5.1)
PROT SERPL-MCNC: 7.6 G/DL (ref 6–8.4)
SODIUM SERPL-SCNC: 140 MMOL/L (ref 136–145)
TRIGL SERPL-MCNC: 91 MG/DL (ref 30–150)

## 2025-01-29 PROCEDURE — 80053 COMPREHEN METABOLIC PANEL: CPT | Performed by: INTERNAL MEDICINE

## 2025-01-29 PROCEDURE — 36415 COLL VENOUS BLD VENIPUNCTURE: CPT | Mod: PO | Performed by: INTERNAL MEDICINE

## 2025-01-29 PROCEDURE — 80061 LIPID PANEL: CPT | Performed by: INTERNAL MEDICINE

## 2025-01-30 ENCOUNTER — OFFICE VISIT (OUTPATIENT)
Dept: INTERNAL MEDICINE | Facility: CLINIC | Age: 60
End: 2025-01-30
Payer: COMMERCIAL

## 2025-01-30 VITALS
WEIGHT: 175.69 LBS | TEMPERATURE: 98 F | HEART RATE: 64 BPM | SYSTOLIC BLOOD PRESSURE: 122 MMHG | BODY MASS INDEX: 29.99 KG/M2 | OXYGEN SATURATION: 99 % | DIASTOLIC BLOOD PRESSURE: 76 MMHG | RESPIRATION RATE: 20 BRPM | HEIGHT: 64 IN

## 2025-01-30 DIAGNOSIS — I10 ESSENTIAL HYPERTENSION: Primary | ICD-10-CM

## 2025-01-30 DIAGNOSIS — Z87.898 HISTORY OF PREDIABETES: ICD-10-CM

## 2025-01-30 PROCEDURE — 99999 PR PBB SHADOW E&M-EST. PATIENT-LVL IV: CPT | Mod: PBBFAC,,, | Performed by: PHYSICIAN ASSISTANT

## 2025-01-30 PROCEDURE — G2211 COMPLEX E/M VISIT ADD ON: HCPCS | Mod: S$GLB,,, | Performed by: PHYSICIAN ASSISTANT

## 2025-01-30 PROCEDURE — 99214 OFFICE O/P EST MOD 30 MIN: CPT | Mod: S$GLB,,, | Performed by: PHYSICIAN ASSISTANT

## 2025-01-30 PROCEDURE — 1159F MED LIST DOCD IN RCRD: CPT | Mod: CPTII,S$GLB,, | Performed by: PHYSICIAN ASSISTANT

## 2025-01-30 PROCEDURE — 1160F RVW MEDS BY RX/DR IN RCRD: CPT | Mod: CPTII,S$GLB,, | Performed by: PHYSICIAN ASSISTANT

## 2025-01-30 PROCEDURE — 3078F DIAST BP <80 MM HG: CPT | Mod: CPTII,S$GLB,, | Performed by: PHYSICIAN ASSISTANT

## 2025-01-30 PROCEDURE — 3008F BODY MASS INDEX DOCD: CPT | Mod: CPTII,S$GLB,, | Performed by: PHYSICIAN ASSISTANT

## 2025-01-30 PROCEDURE — 3074F SYST BP LT 130 MM HG: CPT | Mod: CPTII,S$GLB,, | Performed by: PHYSICIAN ASSISTANT

## 2025-01-30 NOTE — PROGRESS NOTES
"Subjective:      Patient ID: Lia Ragsdale is a 59 y.o. female.    Chief Complaint: Follow-up (She is here for a 3 month follow up after starting cholesterol medication. Had recent labs done. )    Patient is known to me, being seen today for 3mth f/u.   Denies current concerns/complaints     HTN- amlodipine 2.5mg   HLD- on zetia  Anxiety- zoloft 25mg     Labs recently completed, cholesterol improving from prior, metabolic panel normal    Last visit Oct 2024 w PCP      Review of Systems   Constitutional:  Negative for chills, diaphoresis and fever.   HENT:  Negative for congestion, rhinorrhea and sore throat.    Respiratory:  Negative for cough, shortness of breath and wheezing.    Gastrointestinal:  Negative for abdominal pain, constipation, diarrhea, nausea and vomiting.   Skin:  Negative for rash.   Neurological:  Negative for dizziness, light-headedness and headaches.       Objective:   /76 (BP Location: Left arm, Patient Position: Sitting)   Pulse 64   Temp 98 °F (36.7 °C) (Tympanic)   Resp 20   Ht 5' 4" (1.626 m)   Wt 79.7 kg (175 lb 11.3 oz)   LMP 07/18/2016   SpO2 99%   BMI 30.16 kg/m²   Physical Exam  Constitutional:       General: She is not in acute distress.     Appearance: Normal appearance. She is well-developed. She is not ill-appearing.   HENT:      Head: Normocephalic and atraumatic.   Cardiovascular:      Rate and Rhythm: Normal rate and regular rhythm.      Heart sounds: Normal heart sounds. No murmur heard.  Pulmonary:      Effort: Pulmonary effort is normal. No respiratory distress.      Breath sounds: Normal breath sounds. No decreased breath sounds.   Musculoskeletal:      Right lower leg: No edema.      Left lower leg: No edema.   Skin:     General: Skin is warm and dry.      Findings: No rash.   Psychiatric:         Speech: Speech normal.         Behavior: Behavior normal.         Thought Content: Thought content normal.       Assessment:      1. Essential hypertension    2. " History of prediabetes       Plan:   Essential hypertension  -     CBC Auto Differential; Future; Expected date: 01/30/2025  -     Comprehensive Metabolic Panel; Future; Expected date: 01/30/2025  -     Lipid Panel; Future; Expected date: 01/30/2025    History of prediabetes  -     Hemoglobin A1C; Future; Expected date: 01/30/2025      Continue current medication    6mth f/u w labs prior

## 2025-04-03 DIAGNOSIS — M72.2 PLANTAR FASCIITIS OF RIGHT FOOT: ICD-10-CM

## 2025-04-04 NOTE — TELEPHONE ENCOUNTER
No care due was identified.  [facility] Embedded Care Due Messages. Reference number: [id number].   4/03/2025 7:08:55 PM CDT

## 2025-04-04 NOTE — TELEPHONE ENCOUNTER
Refill Routing Note   Medication(s) are not appropriate for processing by [facility] for the following reason(s):        Outside of protocol    [facility] action(s):  Route               Appointments  past 12m or future 3m with PCP    Date Provider   Last Visit   10/29/2024 Rebecca Christensen DO   Next Visit   7/23/2025 Rebecca Christensen DO   ED visits in past 90 days: 0        Note composed:8:19 AM 04/04/2025

## 2025-04-07 RX ORDER — DICLOFENAC SODIUM 75 MG/1
75 TABLET, DELAYED RELEASE ORAL 2 TIMES DAILY
Qty: 60 TABLET | Refills: 5 | Status: SHIPPED | OUTPATIENT
Start: 2025-04-07

## 2025-07-16 ENCOUNTER — LAB VISIT (OUTPATIENT)
Dept: LAB | Facility: HOSPITAL | Age: 60
End: 2025-07-16
Attending: PHYSICIAN ASSISTANT
Payer: COMMERCIAL

## 2025-07-16 DIAGNOSIS — Z87.898 HISTORY OF PREDIABETES: ICD-10-CM

## 2025-07-16 DIAGNOSIS — I10 ESSENTIAL HYPERTENSION: ICD-10-CM

## 2025-07-16 LAB
ABSOLUTE EOSINOPHIL (OHS): 0.15 K/UL
ABSOLUTE MONOCYTE (OHS): 0.45 K/UL (ref 0.3–1)
ABSOLUTE NEUTROPHIL COUNT (OHS): 4.26 K/UL (ref 1.8–7.7)
ALBUMIN SERPL BCP-MCNC: 4 G/DL (ref 3.5–5.2)
ALP SERPL-CCNC: 58 UNIT/L (ref 40–150)
ALT SERPL W/O P-5'-P-CCNC: 19 UNIT/L (ref 10–44)
ANION GAP (OHS): 7 MMOL/L (ref 8–16)
AST SERPL-CCNC: 18 UNIT/L (ref 11–45)
BASOPHILS # BLD AUTO: 0.08 K/UL
BASOPHILS NFR BLD AUTO: 1.2 %
BILIRUB SERPL-MCNC: 0.3 MG/DL (ref 0.1–1)
BUN SERPL-MCNC: 13 MG/DL (ref 6–20)
CALCIUM SERPL-MCNC: 9.2 MG/DL (ref 8.7–10.5)
CHLORIDE SERPL-SCNC: 106 MMOL/L (ref 95–110)
CHOLEST SERPL-MCNC: 206 MG/DL (ref 120–199)
CHOLEST/HDLC SERPL: 3.8 {RATIO} (ref 2–5)
CO2 SERPL-SCNC: 30 MMOL/L (ref 23–29)
CREAT SERPL-MCNC: 0.8 MG/DL (ref 0.5–1.4)
EAG (OHS): 108 MG/DL (ref 68–131)
ERYTHROCYTE [DISTWIDTH] IN BLOOD BY AUTOMATED COUNT: 13.8 % (ref 11.5–14.5)
GFR SERPLBLD CREATININE-BSD FMLA CKD-EPI: >60 ML/MIN/1.73/M2
GLUCOSE SERPL-MCNC: 105 MG/DL (ref 70–110)
HBA1C MFR BLD: 5.4 % (ref 4–5.6)
HCT VFR BLD AUTO: 43.7 % (ref 37–48.5)
HDLC SERPL-MCNC: 54 MG/DL (ref 40–75)
HDLC SERPL: 26.2 % (ref 20–50)
HGB BLD-MCNC: 13.7 GM/DL (ref 12–16)
IMM GRANULOCYTES # BLD AUTO: 0.02 K/UL (ref 0–0.04)
IMM GRANULOCYTES NFR BLD AUTO: 0.3 % (ref 0–0.5)
LDLC SERPL CALC-MCNC: 134.6 MG/DL (ref 63–159)
LYMPHOCYTES # BLD AUTO: 1.56 K/UL (ref 1–4.8)
MCH RBC QN AUTO: 29.5 PG (ref 27–31)
MCHC RBC AUTO-ENTMCNC: 31.4 G/DL (ref 32–36)
MCV RBC AUTO: 94 FL (ref 82–98)
NONHDLC SERPL-MCNC: 152 MG/DL
NUCLEATED RBC (/100WBC) (OHS): 0 /100 WBC
PLATELET # BLD AUTO: 213 K/UL (ref 150–450)
PMV BLD AUTO: 11.2 FL (ref 9.2–12.9)
POTASSIUM SERPL-SCNC: 4.5 MMOL/L (ref 3.5–5.1)
PROT SERPL-MCNC: 7.3 GM/DL (ref 6–8.4)
RBC # BLD AUTO: 4.65 M/UL (ref 4–5.4)
RELATIVE EOSINOPHIL (OHS): 2.3 %
RELATIVE LYMPHOCYTE (OHS): 23.9 % (ref 18–48)
RELATIVE MONOCYTE (OHS): 6.9 % (ref 4–15)
RELATIVE NEUTROPHIL (OHS): 65.4 % (ref 38–73)
SODIUM SERPL-SCNC: 143 MMOL/L (ref 136–145)
TRIGL SERPL-MCNC: 87 MG/DL (ref 30–150)
WBC # BLD AUTO: 6.52 K/UL (ref 3.9–12.7)

## 2025-07-16 PROCEDURE — 83036 HEMOGLOBIN GLYCOSYLATED A1C: CPT

## 2025-07-16 PROCEDURE — 80061 LIPID PANEL: CPT

## 2025-07-16 PROCEDURE — 36415 COLL VENOUS BLD VENIPUNCTURE: CPT | Mod: PO

## 2025-07-16 PROCEDURE — 85025 COMPLETE CBC W/AUTO DIFF WBC: CPT

## 2025-07-16 PROCEDURE — 80053 COMPREHEN METABOLIC PANEL: CPT

## 2025-07-22 RX ORDER — BUSPIRONE HYDROCHLORIDE 5 MG/1
5 TABLET ORAL 2 TIMES DAILY
COMMUNITY
Start: 2025-02-22 | End: 2025-07-23

## 2025-07-23 ENCOUNTER — OFFICE VISIT (OUTPATIENT)
Dept: INTERNAL MEDICINE | Facility: CLINIC | Age: 60
End: 2025-07-23
Payer: COMMERCIAL

## 2025-07-23 VITALS
HEIGHT: 64 IN | WEIGHT: 176.38 LBS | OXYGEN SATURATION: 98 % | DIASTOLIC BLOOD PRESSURE: 74 MMHG | HEART RATE: 62 BPM | TEMPERATURE: 96 F | BODY MASS INDEX: 30.11 KG/M2 | SYSTOLIC BLOOD PRESSURE: 120 MMHG | RESPIRATION RATE: 20 BRPM

## 2025-07-23 DIAGNOSIS — M79.671 RIGHT FOOT PAIN: ICD-10-CM

## 2025-07-23 DIAGNOSIS — Z12.31 ENCOUNTER FOR SCREENING MAMMOGRAM FOR MALIGNANT NEOPLASM OF BREAST: ICD-10-CM

## 2025-07-23 DIAGNOSIS — R73.03 PREDIABETES: ICD-10-CM

## 2025-07-23 DIAGNOSIS — I10 ESSENTIAL HYPERTENSION: Primary | ICD-10-CM

## 2025-07-23 DIAGNOSIS — E78.5 HYPERLIPIDEMIA LDL GOAL <130: ICD-10-CM

## 2025-07-23 DIAGNOSIS — F41.9 ANXIETY: ICD-10-CM

## 2025-07-23 DIAGNOSIS — K21.9 CHRONIC GERD: ICD-10-CM

## 2025-07-23 PROCEDURE — 3074F SYST BP LT 130 MM HG: CPT | Mod: CPTII,S$GLB,, | Performed by: INTERNAL MEDICINE

## 2025-07-23 PROCEDURE — G2211 COMPLEX E/M VISIT ADD ON: HCPCS | Mod: S$GLB,,, | Performed by: INTERNAL MEDICINE

## 2025-07-23 PROCEDURE — 3044F HG A1C LEVEL LT 7.0%: CPT | Mod: CPTII,S$GLB,, | Performed by: INTERNAL MEDICINE

## 2025-07-23 PROCEDURE — 99999 PR PBB SHADOW E&M-EST. PATIENT-LVL IV: CPT | Mod: PBBFAC,,, | Performed by: INTERNAL MEDICINE

## 2025-07-23 PROCEDURE — 3008F BODY MASS INDEX DOCD: CPT | Mod: CPTII,S$GLB,, | Performed by: INTERNAL MEDICINE

## 2025-07-23 PROCEDURE — 99214 OFFICE O/P EST MOD 30 MIN: CPT | Mod: S$GLB,,, | Performed by: INTERNAL MEDICINE

## 2025-07-23 PROCEDURE — 1159F MED LIST DOCD IN RCRD: CPT | Mod: CPTII,S$GLB,, | Performed by: INTERNAL MEDICINE

## 2025-07-23 PROCEDURE — 1160F RVW MEDS BY RX/DR IN RCRD: CPT | Mod: CPTII,S$GLB,, | Performed by: INTERNAL MEDICINE

## 2025-07-23 PROCEDURE — 3078F DIAST BP <80 MM HG: CPT | Mod: CPTII,S$GLB,, | Performed by: INTERNAL MEDICINE

## 2025-07-23 RX ORDER — SERTRALINE HYDROCHLORIDE 25 MG/1
25 TABLET, FILM COATED ORAL DAILY
Qty: 90 TABLET | Refills: 3 | Status: SHIPPED | OUTPATIENT
Start: 2025-07-23

## 2025-07-23 RX ORDER — GABAPENTIN 400 MG/1
400 CAPSULE ORAL NIGHTLY
Qty: 90 CAPSULE | Refills: 0 | Status: SHIPPED | OUTPATIENT
Start: 2025-07-23

## 2025-07-23 RX ORDER — EZETIMIBE 10 MG/1
10 TABLET ORAL DAILY
Qty: 90 TABLET | Refills: 3 | Status: SHIPPED | OUTPATIENT
Start: 2025-07-23 | End: 2026-07-23

## 2025-07-23 RX ORDER — PANTOPRAZOLE SODIUM 40 MG/1
40 TABLET, DELAYED RELEASE ORAL DAILY
Qty: 90 TABLET | Refills: 3 | Status: SHIPPED | OUTPATIENT
Start: 2025-07-23

## 2025-07-23 RX ORDER — AMLODIPINE BESYLATE 2.5 MG/1
2.5 TABLET ORAL DAILY
Qty: 90 TABLET | Refills: 3 | Status: SHIPPED | OUTPATIENT
Start: 2025-07-23

## 2025-07-23 NOTE — PROGRESS NOTES
Lia Ragsdale  60 y.o. White female  [id number]    Chief Complaint:  Chief Complaint   Patient presents with    Follow-up     6 months        History of Present Illness:  History of Present Illness    CHIEF COMPLAINT:  Ms. Ragsdale presents today for follow up    MEDICAL HISTORY:   She has hypertension, hyperlipidemia, prediabetes, GERD and anxiety.     MEDICATIONS:  She continues blood pressure medication, cholesterol medication, Zoloft, and Pantoprazole. She reports being current on all medications and requires refills.    MUSCULOSKELETAL:  She reports history of plantar fasciitis in right foot since March of last year with current foot tenderness. The condition has improved 100% compared to last year, though previously experienced severe night pain. She currently takes Gabapentin 400mg for pain management, increased from 300mg. She experiences foot weakness at times, requiring rest to regain balance. She denies numbness or tingling in the foot but notes pain that limits pressure-bearing activities. She has chronic arthritis affecting her back and hips with history of radiating leg pain. She reports difficulty finding comfortable sleeping positions, particularly when lying on one side, due to back and hip discomfort.    NEUROLOGICAL:  She experiences dizziness upon standing, particularly pronounced in the mornings, requiring slow and careful movement to maintain stability.    LABS:  A1C has improved from previously pre-diabetic range to normal range, showing significant improvement in glycemic control over past couple years.    FAMILY HISTORY:  Mother has diabetes mellitus managed with insulin therapy.         Review of Systems   Constitutional:  Negative for fever.   Respiratory:  Negative for cough and shortness of breath.    Cardiovascular:  Negative for chest pain and leg swelling.   Gastrointestinal:  Negative for abdominal pain, constipation and diarrhea.   Musculoskeletal:  Positive for joint pain.    Neurological:  Negative for dizziness and headaches.   Psychiatric/Behavioral:  The patient is nervous/anxious.        Laboratory:  Lab Results   Component Value Date    WBC 6.52 07/16/2025    HGB 13.7 07/16/2025    HCT 43.7 07/16/2025     07/16/2025    CHOL 206 (H) 07/16/2025    TRIG 87 07/16/2025    HDL 54 07/16/2025    ALT 19 07/16/2025    AST 18 07/16/2025     07/16/2025    K 4.5 07/16/2025     07/16/2025    CREATININE 0.8 07/16/2025    BUN 13 07/16/2025    CO2 30 (H) 07/16/2025    TSH 0.720 10/14/2024    INR 1.0 12/22/2011    HGBA1C 5.4 07/16/2025     Lab Results   Component Value Date    LDLCALC 134.6 07/16/2025       History:  Past Medical History:   Diagnosis Date    Bladder instability 01/13/2015    Depression     History of depression in the past. Pt states it was from a stressful job.    Gallstones     General anesthetics causing adverse effect in therapeutic use     had respiratory distress after propofol    HPV (human papilloma virus) anogenital infection     Hypertension     Peptic ulcer disease        Medications:  Medications Ordered Prior to Encounter[1]    Allergies:  Review of patient's allergies indicates:  No Known Allergies    Exam:  Vitals:    07/23/25 1107   BP: 120/74   Pulse: 62   Resp: 20   Temp: 96 °F (35.6 °C)     Weight: 80 kg (176 lb 5.9 oz)   Body mass index is 30.27 kg/m².      Physical Exam    Vitals: Reviewed.  Constitutional: No acute distress. Well-developed. Not ill-appearing.  Eyes: No scleral icterus.  Cardiovascular: Normal rate and regular rhythm. Normal heart sounds.  Pulmonary: Pulmonary effort is normal. No respiratory distress. Normal breath sounds.  Abdomen: Soft. Nontender. Nondistended. Normoactive bowel sounds.  Extremities: No edema.  Skin: Warm. Dry.  Neurological: Alert and oriented to person, place, and time.  Psychiatric: Behavior normal.         Assessment:  The primary encounter diagnosis was Essential hypertension. Diagnoses of  Hyperlipidemia LDL goal <130, Prediabetes, Right foot pain, Chronic GERD, Anxiety, and Encounter for screening mammogram for malignant neoplasm of breast were also pertinent to this visit.    Assessment & Plan    I10 Essential hypertension  E78.5 Hyperlipidemia LDL goal <130  R73.03 Prediabetes  M79.671 Right foot pain  K21.9 Chronic GERD  F41.9 Anxiety  Z12.31 Encounter for screening mammogram for malignant neoplasm of breast    ESSENTIAL HYPERTENSION:  - Noted occasional dizziness upon standing, potentially related to BP medication.  - Ms. Ragsdale to get up slowly in mornings to prevent dizziness.  - Refilled BP medication.    HYPERLIPIDEMIA LDL GOAL <130:  - Reviewed lab results, noting improvement in cholesterol levels.  - Refilled Zetia.    PREDIABETES:  - Reviewed lab results, noting A1C now in normal range.    RIGHT FOOT PAIN:  - Plantar fasciitis symptoms improved significantly with Gabapentin treatment.  - Discussed typical course of plantar fasciitis, noting many patients experience resolution after about a year.  - Continued gabapentin 400 mg capsule at night for plantar fasciitis.  - Decrease to every other night for 1-2 weeks.  - If tolerated, further decrease frequency.  - Resume regular dosing if pain returns.  - Contact office regarding how gabapentin tapering is working.    CHRONIC GERD:  - Refilled pantoprazole.    ANXIETY:  - Refilled Zoloft.    ADDITIONAL RECOMMENDATIONS:  - Recommend maintaining adequate water intake.  - Follow up in 6 months for annual appointment with [facility].                        [1]   Current Outpatient Medications on File Prior to Visit   Medication Sig Dispense Refill    cetirizine (ZYRTEC) 10 MG tablet Take 10 mg by mouth daily as needed.       diclofenac (VOLTAREN) 75 MG EC tablet TAKE 1 TABLET(75 MG) BY MOUTH TWICE DAILY 60 tablet 5    triamcinolone acetonide 0.1% (KENALOG) 0.1 % cream Apply topically 2 (two) times daily. 45 g 3    [DISCONTINUED] amLODIPine (NORVASC)  2.5 MG tablet Take 1 tablet (2.5 mg total) by mouth once daily. 90 tablet 3    [DISCONTINUED] ezetimibe (ZETIA) 10 mg tablet Take 1 tablet (10 mg total) by mouth once daily. 90 tablet 3    [DISCONTINUED] gabapentin (NEURONTIN) 400 MG capsule TAKE 1 CAPSULE(400 MG) BY MOUTH DAILY 60 capsule 2    [DISCONTINUED] pantoprazole (PROTONIX) 40 MG tablet Take 1 tablet (40 mg total) by mouth once daily. 90 tablet 3    [DISCONTINUED] sertraline (ZOLOFT) 25 MG tablet Take 1 tablet (25 mg total) by mouth once daily. 90 tablet 3    [DISCONTINUED] busPIRone (BUSPAR) 5 MG Tab Take 5 mg by mouth 2 (two) times daily. (Patient not taking: Reported on 7/23/2025)       No current facility-administered medications on file prior to visit.